# Patient Record
Sex: FEMALE | Race: WHITE | NOT HISPANIC OR LATINO | Employment: OTHER | URBAN - METROPOLITAN AREA
[De-identification: names, ages, dates, MRNs, and addresses within clinical notes are randomized per-mention and may not be internally consistent; named-entity substitution may affect disease eponyms.]

---

## 2017-03-24 ENCOUNTER — APPOINTMENT (OUTPATIENT)
Dept: GENERAL RADIOLOGY | Facility: HOSPITAL | Age: 72
End: 2017-03-24

## 2017-03-24 ENCOUNTER — APPOINTMENT (OUTPATIENT)
Dept: MRI IMAGING | Facility: HOSPITAL | Age: 72
End: 2017-03-24

## 2017-03-24 ENCOUNTER — HOSPITAL ENCOUNTER (OUTPATIENT)
Facility: HOSPITAL | Age: 72
Setting detail: OBSERVATION
Discharge: HOME | End: 2017-03-26
Attending: EMERGENCY MEDICINE | Admitting: INTERNAL MEDICINE

## 2017-03-24 ENCOUNTER — APPOINTMENT (OUTPATIENT)
Dept: CT IMAGING | Facility: HOSPITAL | Age: 72
End: 2017-03-24

## 2017-03-24 DIAGNOSIS — R41.82 ALTERED MENTAL STATUS, UNSPECIFIED: ICD-10-CM

## 2017-03-24 DIAGNOSIS — Z74.09 IMPAIRED FUNCTIONAL MOBILITY, BALANCE, GAIT, AND ENDURANCE: ICD-10-CM

## 2017-03-24 DIAGNOSIS — R41.841 COGNITIVE COMMUNICATION DEFICIT: ICD-10-CM

## 2017-03-24 DIAGNOSIS — Z78.9 IMPAIRED MOBILITY AND ADLS: ICD-10-CM

## 2017-03-24 DIAGNOSIS — R53.1 LEFT-SIDED WEAKNESS: ICD-10-CM

## 2017-03-24 DIAGNOSIS — R47.1 DYSARTHRIA: Primary | ICD-10-CM

## 2017-03-24 DIAGNOSIS — Z74.09 IMPAIRED MOBILITY AND ADLS: ICD-10-CM

## 2017-03-24 PROBLEM — G45.9 TIA (TRANSIENT ISCHEMIC ATTACK): Status: ACTIVE | Noted: 2017-03-24

## 2017-03-24 LAB
ALBUMIN SERPL-MCNC: 4.4 G/DL (ref 3.2–4.8)
ALBUMIN/GLOB SERPL: 2 G/DL (ref 1.5–2.5)
ALP SERPL-CCNC: 72 U/L (ref 25–100)
ALT SERPL W P-5'-P-CCNC: 30 U/L (ref 7–40)
ALT SERPL W P-5'-P-CCNC: 30 U/L (ref 7–40)
ANION GAP SERPL CALCULATED.3IONS-SCNC: 13 MMOL/L (ref 3–11)
APTT PPP: 28.2 SECONDS (ref 24–31)
AST SERPL-CCNC: 33 U/L (ref 0–33)
AST SERPL-CCNC: 35 U/L (ref 0–33)
BASOPHILS # BLD AUTO: 0.03 10*3/MM3 (ref 0–0.2)
BASOPHILS NFR BLD AUTO: 0.5 % (ref 0–1)
BILIRUB SERPL-MCNC: 0.6 MG/DL (ref 0.3–1.2)
BUN BLD-MCNC: 10 MG/DL (ref 9–23)
BUN/CREAT SERPL: 14.3 (ref 7–25)
CALCIUM SPEC-SCNC: 9.6 MG/DL (ref 8.7–10.4)
CHLORIDE SERPL-SCNC: 109 MMOL/L (ref 99–109)
CO2 SERPL-SCNC: 23 MMOL/L (ref 20–31)
CREAT BLD-MCNC: 0.7 MG/DL (ref 0.6–1.3)
DEPRECATED RDW RBC AUTO: 44.3 FL (ref 37–54)
EOSINOPHIL # BLD AUTO: 0.06 10*3/MM3 (ref 0.1–0.3)
EOSINOPHIL NFR BLD AUTO: 0.9 % (ref 0–3)
ERYTHROCYTE [DISTWIDTH] IN BLOOD BY AUTOMATED COUNT: 13.4 % (ref 11.3–14.5)
GFR SERPL CREATININE-BSD FRML MDRD: 82 ML/MIN/1.73
GLOBULIN UR ELPH-MCNC: 2.2 GM/DL
GLUCOSE BLD-MCNC: 132 MG/DL (ref 70–100)
HCT VFR BLD AUTO: 38.7 % (ref 34.5–44)
HGB BLD-MCNC: 12.4 G/DL (ref 11.5–15.5)
HOLD SPECIMEN: NORMAL
HOLD SPECIMEN: NORMAL
IMM GRANULOCYTES # BLD: 0.06 10*3/MM3 (ref 0–0.03)
IMM GRANULOCYTES NFR BLD: 0.9 % (ref 0–0.6)
INR PPP: 1 (ref 0.8–1.2)
LYMPHOCYTES # BLD AUTO: 2.16 10*3/MM3 (ref 0.6–4.8)
LYMPHOCYTES NFR BLD AUTO: 33.3 % (ref 24–44)
MCH RBC QN AUTO: 29.1 PG (ref 27–31)
MCHC RBC AUTO-ENTMCNC: 32 G/DL (ref 32–36)
MCV RBC AUTO: 90.8 FL (ref 80–99)
MONOCYTES # BLD AUTO: 0.33 10*3/MM3 (ref 0–1)
MONOCYTES NFR BLD AUTO: 5.1 % (ref 0–12)
NEUTROPHILS # BLD AUTO: 3.85 10*3/MM3 (ref 1.5–8.3)
NEUTROPHILS NFR BLD AUTO: 59.3 % (ref 41–71)
PLATELET # BLD AUTO: 158 10*3/MM3 (ref 150–450)
PMV BLD AUTO: 12.5 FL (ref 6–12)
POTASSIUM BLD-SCNC: 3.8 MMOL/L (ref 3.5–5.5)
PROT SERPL-MCNC: 6.6 G/DL (ref 5.7–8.2)
PROTHROMBIN TIME: 11.5 SECONDS (ref 12.8–15.2)
RBC # BLD AUTO: 4.26 10*6/MM3 (ref 3.89–5.14)
SODIUM BLD-SCNC: 145 MMOL/L (ref 132–146)
TROPONIN I SERPL-MCNC: 0 NG/ML (ref 0–0.07)
WBC NRBC COR # BLD: 6.49 10*3/MM3 (ref 3.5–10.8)
WHOLE BLOOD HOLD SPECIMEN: NORMAL
WHOLE BLOOD HOLD SPECIMEN: NORMAL

## 2017-03-24 PROCEDURE — 70551 MRI BRAIN STEM W/O DYE: CPT

## 2017-03-24 PROCEDURE — G0378 HOSPITAL OBSERVATION PER HR: HCPCS

## 2017-03-24 PROCEDURE — 87077 CULTURE AEROBIC IDENTIFY: CPT | Performed by: INTERNAL MEDICINE

## 2017-03-24 PROCEDURE — 87086 URINE CULTURE/COLONY COUNT: CPT | Performed by: INTERNAL MEDICINE

## 2017-03-24 PROCEDURE — 87186 SC STD MICRODIL/AGAR DIL: CPT | Performed by: INTERNAL MEDICINE

## 2017-03-24 PROCEDURE — 85610 PROTHROMBIN TIME: CPT

## 2017-03-24 PROCEDURE — 81001 URINALYSIS AUTO W/SCOPE: CPT | Performed by: INTERNAL MEDICINE

## 2017-03-24 PROCEDURE — 83036 HEMOGLOBIN GLYCOSYLATED A1C: CPT | Performed by: INTERNAL MEDICINE

## 2017-03-24 PROCEDURE — 99285 EMERGENCY DEPT VISIT HI MDM: CPT

## 2017-03-24 PROCEDURE — 93005 ELECTROCARDIOGRAM TRACING: CPT | Performed by: EMERGENCY MEDICINE

## 2017-03-24 PROCEDURE — 85730 THROMBOPLASTIN TIME PARTIAL: CPT | Performed by: EMERGENCY MEDICINE

## 2017-03-24 PROCEDURE — 80053 COMPREHEN METABOLIC PANEL: CPT | Performed by: INTERNAL MEDICINE

## 2017-03-24 PROCEDURE — 84450 TRANSFERASE (AST) (SGOT): CPT | Performed by: EMERGENCY MEDICINE

## 2017-03-24 PROCEDURE — 84460 ALANINE AMINO (ALT) (SGPT): CPT | Performed by: EMERGENCY MEDICINE

## 2017-03-24 PROCEDURE — 84484 ASSAY OF TROPONIN QUANT: CPT

## 2017-03-24 PROCEDURE — 71010 HC CHEST PA OR AP: CPT

## 2017-03-24 PROCEDURE — 85014 HEMATOCRIT: CPT

## 2017-03-24 PROCEDURE — 85025 COMPLETE CBC W/AUTO DIFF WBC: CPT | Performed by: EMERGENCY MEDICINE

## 2017-03-24 PROCEDURE — 70450 CT HEAD/BRAIN W/O DYE: CPT

## 2017-03-24 PROCEDURE — 99220 PR INITIAL OBSERVATION CARE/DAY 70 MINUTES: CPT | Performed by: INTERNAL MEDICINE

## 2017-03-24 PROCEDURE — 80047 BASIC METABLC PNL IONIZED CA: CPT

## 2017-03-24 RX ORDER — ASPIRIN 81 MG/1
81 TABLET, CHEWABLE ORAL DAILY
Status: DISCONTINUED | OUTPATIENT
Start: 2017-03-25 | End: 2017-03-26 | Stop reason: HOSPADM

## 2017-03-24 RX ORDER — CETIRIZINE HYDROCHLORIDE 10 MG/1
10 TABLET ORAL DAILY PRN
COMMUNITY

## 2017-03-24 RX ORDER — SODIUM CHLORIDE 0.9 % (FLUSH) 0.9 %
1-10 SYRINGE (ML) INJECTION AS NEEDED
Status: DISCONTINUED | OUTPATIENT
Start: 2017-03-24 | End: 2017-03-26 | Stop reason: HOSPADM

## 2017-03-24 RX ORDER — PAROXETINE HYDROCHLORIDE 20 MG/1
20 TABLET, FILM COATED ORAL EVERY MORNING
Status: DISCONTINUED | OUTPATIENT
Start: 2017-03-25 | End: 2017-03-26 | Stop reason: HOSPADM

## 2017-03-24 RX ORDER — ONDANSETRON 2 MG/ML
4 INJECTION INTRAMUSCULAR; INTRAVENOUS EVERY 6 HOURS PRN
Status: DISCONTINUED | OUTPATIENT
Start: 2017-03-24 | End: 2017-03-26 | Stop reason: HOSPADM

## 2017-03-24 RX ORDER — LEVOTHYROXINE SODIUM 0.05 MG/1
50 TABLET ORAL DAILY
Status: DISCONTINUED | OUTPATIENT
Start: 2017-03-25 | End: 2017-03-26 | Stop reason: HOSPADM

## 2017-03-24 RX ORDER — ASPIRIN 300 MG/1
300 SUPPOSITORY RECTAL DAILY
Status: DISCONTINUED | OUTPATIENT
Start: 2017-03-25 | End: 2017-03-26 | Stop reason: HOSPADM

## 2017-03-24 RX ORDER — ONDANSETRON 4 MG/1
4 TABLET, FILM COATED ORAL EVERY 6 HOURS PRN
Status: DISCONTINUED | OUTPATIENT
Start: 2017-03-24 | End: 2017-03-26 | Stop reason: HOSPADM

## 2017-03-24 RX ORDER — LEVOTHYROXINE SODIUM 0.07 MG/1
75 TABLET ORAL DAILY
COMMUNITY

## 2017-03-24 RX ORDER — ATORVASTATIN CALCIUM 40 MG/1
40 TABLET, FILM COATED ORAL NIGHTLY
Status: DISCONTINUED | OUTPATIENT
Start: 2017-03-24 | End: 2017-03-26 | Stop reason: HOSPADM

## 2017-03-24 RX ORDER — DEXTROSE MONOHYDRATE 25 G/50ML
25 INJECTION, SOLUTION INTRAVENOUS
Status: DISCONTINUED | OUTPATIENT
Start: 2017-03-24 | End: 2017-03-26 | Stop reason: HOSPADM

## 2017-03-24 RX ORDER — LISINOPRIL 10 MG/1
10 TABLET ORAL DAILY
Status: DISCONTINUED | OUTPATIENT
Start: 2017-03-25 | End: 2017-03-26 | Stop reason: HOSPADM

## 2017-03-24 RX ORDER — PAROXETINE HYDROCHLORIDE 20 MG/1
20 TABLET, FILM COATED ORAL EVERY MORNING
COMMUNITY

## 2017-03-24 RX ORDER — DOCUSATE SODIUM 100 MG/1
100 CAPSULE, LIQUID FILLED ORAL 2 TIMES DAILY
Status: DISCONTINUED | OUTPATIENT
Start: 2017-03-25 | End: 2017-03-26 | Stop reason: HOSPADM

## 2017-03-24 RX ORDER — LISINOPRIL 10 MG/1
10 TABLET ORAL DAILY
COMMUNITY

## 2017-03-24 RX ORDER — ATORVASTATIN CALCIUM 40 MG/1
40 TABLET, FILM COATED ORAL DAILY
COMMUNITY

## 2017-03-24 RX ORDER — SODIUM CHLORIDE 0.9 % (FLUSH) 0.9 %
10 SYRINGE (ML) INJECTION AS NEEDED
Status: DISCONTINUED | OUTPATIENT
Start: 2017-03-24 | End: 2017-03-26 | Stop reason: HOSPADM

## 2017-03-24 RX ORDER — ACETAMINOPHEN 325 MG/1
650 TABLET ORAL EVERY 6 HOURS PRN
Status: DISCONTINUED | OUTPATIENT
Start: 2017-03-24 | End: 2017-03-26 | Stop reason: HOSPADM

## 2017-03-24 RX ORDER — NICOTINE POLACRILEX 4 MG
15 LOZENGE BUCCAL
Status: DISCONTINUED | OUTPATIENT
Start: 2017-03-24 | End: 2017-03-26 | Stop reason: HOSPADM

## 2017-03-24 RX ORDER — FAMOTIDINE 20 MG/1
20 TABLET, FILM COATED ORAL 2 TIMES DAILY
Status: DISCONTINUED | OUTPATIENT
Start: 2017-03-25 | End: 2017-03-26 | Stop reason: HOSPADM

## 2017-03-24 RX ORDER — ASPIRIN 81 MG/1
81 TABLET ORAL DAILY
COMMUNITY

## 2017-03-24 NOTE — ED PROVIDER NOTES
Subjective   HPI Comments: Mrs. Montalvo is a 71 y.o female who presents to the ED with AMS. Pt was flown in today via helicopter after at her PCP she had some mild left sided weakness, confusion, and slurred speech. EMS reports that she has episodes of confusion, and her slurred speech comes and goes and is better now than she they picked her up. Pt herself complains of burry vision, but no other acute sx at this time.    She had a pressure of 220/120 and blood sugar of 149 when conducted by in flight EMS.    Patient is a 71 y.o. female presenting with neurologic complaint.   History provided by:  EMS personnel  Neurologic Problem   The patient's primary symptoms include an altered mental status, focal weakness and slurred speech. This is a new problem. The current episode started today. The neurological problem developed suddenly. Last Known Well Instant: Unknown.  The problem has been gradually improving since onset. There was left-sided focality noted. Associated symptoms include confusion.       Review of Systems   Unable to perform ROS: Mental status change   Eyes: Positive for visual disturbance.   Neurological: Positive for focal weakness, facial asymmetry and speech difficulty.   Psychiatric/Behavioral: Positive for confusion.       Past Medical History:   Diagnosis Date   • Arthritis    • CHF (congestive heart failure)    • Coronary artery disease    • Depression    • Diabetes mellitus    • Disease of thyroid gland    • Migraine    • Myocardial infarction    • Pneumonia        No Known Allergies    Past Surgical History:   Procedure Laterality Date   • CARDIAC SURGERY         History reviewed. No pertinent family history.    Social History     Social History   • Marital status:      Spouse name: N/A   • Number of children: N/A   • Years of education: N/A     Social History Main Topics   • Smoking status: Never Smoker   • Smokeless tobacco: None   • Alcohol use No   • Drug use: No   • Sexual activity:  Defer     Other Topics Concern   • None     Social History Narrative   • None         Objective   Physical Exam   Constitutional: She appears well-developed and well-nourished. No distress.   Appears confused   HENT:   Head: Normocephalic and atraumatic.   Eyes: Conjunctivae are normal.   Neck: Normal range of motion. Neck supple. Carotid bruit is not present.   Cardiovascular: Intact distal pulses.    Pulses:       Radial pulses are 2+ on the right side, and 2+ on the left side.        Posterior tibial pulses are 2+ on the right side, and 2+ on the left side.   Pulmonary/Chest: Effort normal and breath sounds normal. No respiratory distress.   Abdominal: Soft. There is no tenderness.   Musculoskeletal: Normal range of motion.   Neurological: She is alert. She has normal strength. No sensory deficit.   Negative bilateral straight leg test. No pronator drift. Sensation intact in all 4 extremities. Pt somewhat confused when both lower extremities are touched at the same time. She changes back and forth being able to feel left vs right but has sensation bilaterally. Oriented to place, but confused about month and year.    Skin: Skin is warm and dry.   Psychiatric: She has a normal mood and affect. Her behavior is normal.   Nursing note and vitals reviewed.      Critical Care  Performed by: HAJA DISLA  Authorized by: HAJA DISLA   Total critical care time: 40 minutes  Critical care time was exclusive of separately billable procedures and treating other patients.  Critical care was necessary to treat or prevent imminent or life-threatening deterioration of the following conditions: CNS failure or compromise.  Critical care was time spent personally by me on the following activities: discussions with consultants, evaluation of patient's response to treatment, obtaining history from patient or surrogate, ordering and review of laboratory studies, pulse oximetry, re-evaluation of patient's condition, ordering and  review of radiographic studies, ordering and performing treatments and interventions, examination of patient, development of treatment plan with patient or surrogate and review of old charts.  Comments: Multiple re-evaluations, serial examinations, discussions with hospitalist, decide to not administer tPa.               ED Course  ED Course   Comment By Time   Dr. Griffiths discussed in more detail the presenting sx with family. They state she has spells of loss of vision, hearing, and confusion. Pt notes that her speech has improved and feels much better than when she came into the hospital. Pt is understanding of the plan. Jerrica Nix 03/24 1841   Discussed case with Dr. Yung, hospitalist, who will admit the pt. -MS Jerrica Nix 03/24 1911     Recent Results (from the past 24 hour(s))   aPTT    Collection Time: 03/24/17  6:29 PM   Result Value Ref Range    PTT 28.2 24.0 - 31.0 seconds   AST    Collection Time: 03/24/17  6:29 PM   Result Value Ref Range    AST (SGOT) 33 0 - 33 U/L   ALT    Collection Time: 03/24/17  6:29 PM   Result Value Ref Range    ALT (SGPT) 30 7 - 40 U/L   CBC Auto Differential    Collection Time: 03/24/17  6:29 PM   Result Value Ref Range    WBC 6.49 3.50 - 10.80 10*3/mm3    RBC 4.26 3.89 - 5.14 10*6/mm3    Hemoglobin 12.4 11.5 - 15.5 g/dL    Hematocrit 38.7 34.5 - 44.0 %    MCV 90.8 80.0 - 99.0 fL    MCH 29.1 27.0 - 31.0 pg    MCHC 32.0 32.0 - 36.0 g/dL    RDW 13.4 11.3 - 14.5 %    RDW-SD 44.3 37.0 - 54.0 fl    MPV 12.5 (H) 6.0 - 12.0 fL    Platelets 158 150 - 450 10*3/mm3    Neutrophil % 59.3 41.0 - 71.0 %    Lymphocyte % 33.3 24.0 - 44.0 %    Monocyte % 5.1 0.0 - 12.0 %    Eosinophil % 0.9 0.0 - 3.0 %    Basophil % 0.5 0.0 - 1.0 %    Immature Grans % 0.9 (H) 0.0 - 0.6 %    Neutrophils, Absolute 3.85 1.50 - 8.30 10*3/mm3    Lymphocytes, Absolute 2.16 0.60 - 4.80 10*3/mm3    Monocytes, Absolute 0.33 0.00 - 1.00 10*3/mm3    Eosinophils, Absolute 0.06 (L) 0.10 - 0.30 10*3/mm3    Basophils,  "Absolute 0.03 0.00 - 0.20 10*3/mm3    Immature Grans, Absolute 0.06 (H) 0.00 - 0.03 10*3/mm3   POC Troponin, Rapid    Collection Time: 03/24/17  6:30 PM   Result Value Ref Range    Troponin I 0.00 0.00 - 0.07 ng/mL     Note: In addition to lab results from this visit, the labs listed above may include labs taken at another facility or during a different encounter within the last 24 hours. Please correlate lab times with ED admission and discharge times for further clarification of the services performed during this visit.    CT Head Without Contrast Stroke Protocol    (Results Pending)   XR Chest 1 View    (Results Pending)     Vitals:    03/24/17 1725 03/24/17 1930   BP: 168/86 175/88   BP Location: Left arm    Patient Position: Lying    Pulse: 92 90   Resp: 18    Temp: 99.6 °F (37.6 °C)    TempSrc: Oral    SpO2: 96% 94%   Weight: 162 lb (73.5 kg)    Height: 68\" (172.7 cm)      Medications   sodium chloride 0.9 % flush 10 mL (not administered)     ECG/EMG Results (last 24 hours)     Procedure Component Value Units Date/Time    ECG 12 Lead [[id number]] Collected:  03/24/17 1833     Updated:  03/24/17 1841                        MDM    Final diagnoses:   Dysarthria   Altered mental status, unspecified   Left-sided weakness       Documentation assistance provided by librado STAPLETON.  Information recorded by the scribe was done at my direction and has been verified and validated by me.     Jerrica Stapleton  03/24/17 1741       Jerrica Stapleton  03/24/17 1742       Jerrica Stapleton  03/24/17 1828       Jerrica Stapleton  03/24/17 1829       Jerrica Stapleton  03/24/17 1939       Jerrica Stapleton  03/24/17 1942       Sammy Griffiths MD  03/27/17 2204    "

## 2017-03-24 NOTE — NURSING NOTE
"Stroke Navigator CODE 19    HPI    Kaylan Roland is a 71 y.o.  female on whom I am evaluating as a Code 19 for a possible acute stroke.     Code 19 location: ED; arrived via [employer]    Mrs. Roland was at her PCP's office today when they noticed \"slight\" left sided weakness, confusion, and facial droop.  EMS was called.  Upon arrival EMS noted that she had some slurred speech but no focal weakness or facial droop.  Her BP was 220/120.  Her slurred speech has been fluctuating and confusion has improved per flight crew.    · Last known well: just prior to symptom onset    · Symptom onset: ~1630  · GCS: 14  · Baseline level of function known yes; Modified Linn: 0   · Current symptoms include; confusion.   · Patient is not a candidate for thrombolytic therapy per Dr. Griffiths.  · Patient is not a candidate for Neuro Intervention due to NIHSS <6.    Stroke risk factors: hypertension.     Prior stroke history: no    · Imaging performed: CT head    NIHSS       1a. Level Of Consciousness: 0-->Alert: keenly responsive  1b. LOC Questions: 1-->Answers one question correctly  1c. LOC Commands: 0-->Performs both tasks correctly  2. Best Gaze: 0-->Normal  3. Visual: 0-->No visual loss  4. Facial Palsy: 0-->Normal symmetrical movements  5a. Motor Arm, Left: 0-->No drift: limb holds 90 (or 45) degrees for full 10 secs  5b. Motor Arm, Right: 0-->No drift: limb holds 90 (or 45) degrees for full 10 secs  6a. Motor Leg, Left: 0-->No drift: leg holds 30 degree position for full 5 secs  6b. Motor Leg, Right: 0-->No drift: leg holds 30 degree position for full 5 secs  7. Limb Ataxia: 0-->Absent  8. Sensory: 0-->Normal: no sensory loss  9. Best Language: 0-->No aphasia: normal  10. Dysarthria: 0-->Normal  11. Extinction and Inattention (formerly Neglect): 0-->No abnormality    Total (NIH Stroke Scale): 1    PLAN    Await final diagnosis and plan from Dr. Griffiths and Neurology.  We will continue to follow.      Christy Lyle, " RN  Stroke Navigator/Nurse Extender

## 2017-03-25 ENCOUNTER — APPOINTMENT (OUTPATIENT)
Dept: CARDIOLOGY | Facility: HOSPITAL | Age: 72
End: 2017-03-25
Attending: INTERNAL MEDICINE

## 2017-03-25 ENCOUNTER — APPOINTMENT (OUTPATIENT)
Dept: NEUROLOGY | Facility: HOSPITAL | Age: 72
End: 2017-03-25
Attending: PSYCHIATRY & NEUROLOGY

## 2017-03-25 PROBLEM — IMO0001 PAROXYSMAL SPELLS: Status: ACTIVE | Noted: 2017-03-25

## 2017-03-25 LAB
ARTICHOKE IGE QN: 26 MG/DL (ref 0–130)
BACTERIA UR QL AUTO: ABNORMAL /HPF
BH CV ECHO MEAS - AI DEC SLOPE: 166.8 CM/SEC^2
BH CV ECHO MEAS - AI MAX PG: 72.8 MMHG
BH CV ECHO MEAS - AI MAX VEL: 426.2 CM/SEC
BH CV ECHO MEAS - AI P1/2T: 748.3 MSEC
BH CV ECHO MEAS - AO MAX PG (FULL): 61.3 MMHG
BH CV ECHO MEAS - AO MAX PG: 65.4 MMHG
BH CV ECHO MEAS - AO MEAN PG (FULL): 36.3 MMHG
BH CV ECHO MEAS - AO MEAN PG: 38.8 MMHG
BH CV ECHO MEAS - AO ROOT AREA: 6.7 CM^2
BH CV ECHO MEAS - AO ROOT DIAM: 2.9 CM
BH CV ECHO MEAS - AO V2 MAX: 404.5 CM/SEC
BH CV ECHO MEAS - AO V2 MEAN: 289.1 CM/SEC
BH CV ECHO MEAS - AO V2 VTI: 103.8 CM
BH CV ECHO MEAS - AVA(I,A): 0.77 CM^2
BH CV ECHO MEAS - AVA(I,D): 0.77 CM^2
BH CV ECHO MEAS - AVA(V,A): 0.78 CM^2
BH CV ECHO MEAS - AVA(V,D): 0.78 CM^2
BH CV ECHO MEAS - CONTRAST EF (2CH): 55.6 ML/M^2
BH CV ECHO MEAS - CONTRAST EF 4CH: 74.4 ML/M^2
BH CV ECHO MEAS - EDV(CUBED): 101.6 ML
BH CV ECHO MEAS - EDV(MOD-SP2): 27 ML
BH CV ECHO MEAS - EDV(MOD-SP4): 39 ML
BH CV ECHO MEAS - EDV(TEICH): 100.6 ML
BH CV ECHO MEAS - EF(CUBED): 71.6 %
BH CV ECHO MEAS - EF(MOD-SP2): 55.6 %
BH CV ECHO MEAS - EF(MOD-SP4): 74.4 %
BH CV ECHO MEAS - EF(TEICH): 63.3 %
BH CV ECHO MEAS - ESV(CUBED): 28.9 ML
BH CV ECHO MEAS - ESV(MOD-SP2): 12 ML
BH CV ECHO MEAS - ESV(MOD-SP4): 10 ML
BH CV ECHO MEAS - ESV(TEICH): 37 ML
BH CV ECHO MEAS - FS: 34.2 %
BH CV ECHO MEAS - IVS/LVPW: 1.2
BH CV ECHO MEAS - IVSD: 1.5 CM
BH CV ECHO MEAS - LA DIMENSION: 4.3 CM
BH CV ECHO MEAS - LA/AO: 1.5
BH CV ECHO MEAS - LV MASS(C)D: 264.9 GRAMS
BH CV ECHO MEAS - LV MAX PG: 4.2 MMHG
BH CV ECHO MEAS - LV MEAN PG: 2.5 MMHG
BH CV ECHO MEAS - LV V1 MAX: 102.2 CM/SEC
BH CV ECHO MEAS - LV V1 MEAN: 74.5 CM/SEC
BH CV ECHO MEAS - LV V1 VTI: 26 CM
BH CV ECHO MEAS - LVIDD: 4.7 CM
BH CV ECHO MEAS - LVIDS: 3.1 CM
BH CV ECHO MEAS - LVLD AP2: 4.8 CM
BH CV ECHO MEAS - LVLD AP4: 6.1 CM
BH CV ECHO MEAS - LVLS AP2: 4.2 CM
BH CV ECHO MEAS - LVLS AP4: 4.6 CM
BH CV ECHO MEAS - LVOT AREA (M): 3.1 CM^2
BH CV ECHO MEAS - LVOT AREA: 3.1 CM^2
BH CV ECHO MEAS - LVOT DIAM: 2 CM
BH CV ECHO MEAS - LVPWD: 1.3 CM
BH CV ECHO MEAS - MV A MAX VEL: 70.1 CM/SEC
BH CV ECHO MEAS - MV E MAX VEL: 96.3 CM/SEC
BH CV ECHO MEAS - MV E/A: 1.4
BH CV ECHO MEAS - PA ACC SLOPE: 457.9 CM/SEC^2
BH CV ECHO MEAS - PA ACC TIME: 0.16 SEC
BH CV ECHO MEAS - PA PR(ACCEL): 7.7 MMHG
BH CV ECHO MEAS - SV(AO): 692.3 ML
BH CV ECHO MEAS - SV(CUBED): 72.7 ML
BH CV ECHO MEAS - SV(LVOT): 79.8 ML
BH CV ECHO MEAS - SV(MOD-SP2): 15 ML
BH CV ECHO MEAS - SV(MOD-SP4): 29 ML
BH CV ECHO MEAS - SV(TEICH): 63.7 ML
BH CV ECHO MEAS - TAPSE (>1.6): 1.4 CM2
BH CV ECHO MEAS - TR MAX VEL: 309.9 CM/SEC
BH CV XLRA - RV BASE: 3.9 CM
BH CV XLRA - RV LENGTH: 5.1 CM
BH CV XLRA - RV MID: 2.8 CM
BH CV XLRA MEAS LEFT CCA RATIO VEL: 52.8 CM/SEC
BH CV XLRA MEAS LEFT DIST CCA EDV: 12.2 CM/SEC
BH CV XLRA MEAS LEFT DIST CCA PSV: 53.3 CM/SEC
BH CV XLRA MEAS LEFT DIST ICA EDV: 26.6 CM/SEC
BH CV XLRA MEAS LEFT DIST ICA PSV: 100.8 CM/SEC
BH CV XLRA MEAS LEFT ICA RATIO VEL: 92.5 CM/SEC
BH CV XLRA MEAS LEFT ICA/CCA RATIO: 1.8
BH CV XLRA MEAS LEFT MID ICA EDV: 31.9 CM/SEC
BH CV XLRA MEAS LEFT MID ICA PSV: 93 CM/SEC
BH CV XLRA MEAS LEFT PROX CCA EDV: 18.2 CM/SEC
BH CV XLRA MEAS LEFT PROX CCA PSV: 74 CM/SEC
BH CV XLRA MEAS LEFT PROX ECA PSV: 51.1 CM/SEC
BH CV XLRA MEAS LEFT PROX ICA EDV: 24 CM/SEC
BH CV XLRA MEAS LEFT PROX ICA PSV: 77.7 CM/SEC
BH CV XLRA MEAS LEFT PROX SCLA PSV: 94.3 CM/SEC
BH CV XLRA MEAS LEFT VERTEBRAL A EDV: 13.1 CM/SEC
BH CV XLRA MEAS LEFT VERTEBRAL A PSV: 36.7 CM/SEC
BH CV XLRA MEAS RIGHT CCA RATIO VEL: 52.5 CM/SEC
BH CV XLRA MEAS RIGHT DIST CCA EDV: 16.2 CM/SEC
BH CV XLRA MEAS RIGHT DIST CCA PSV: 53 CM/SEC
BH CV XLRA MEAS RIGHT DIST ICA EDV: 18.7 CM/SEC
BH CV XLRA MEAS RIGHT DIST ICA PSV: 79.6 CM/SEC
BH CV XLRA MEAS RIGHT ICA RATIO VEL: 91.3 CM/SEC
BH CV XLRA MEAS RIGHT ICA/CCA RATIO: 1.7
BH CV XLRA MEAS RIGHT MID ICA EDV: 27.5 CM/SEC
BH CV XLRA MEAS RIGHT MID ICA PSV: 91.8 CM/SEC
BH CV XLRA MEAS RIGHT PROX CCA EDV: 23.6 CM/SEC
BH CV XLRA MEAS RIGHT PROX CCA PSV: 89.6 CM/SEC
BH CV XLRA MEAS RIGHT PROX ECA PSV: 46.7 CM/SEC
BH CV XLRA MEAS RIGHT PROX ICA EDV: 26 CM/SEC
BH CV XLRA MEAS RIGHT PROX ICA PSV: 86.4 CM/SEC
BH CV XLRA MEAS RIGHT PROX SCLA PSV: 99.8 CM/SEC
BH CV XLRA MEAS RIGHT VERTEBRAL A PSV: 50.6 CM/SEC
BILIRUB UR QL STRIP: NEGATIVE
CHOLEST SERPL-MCNC: 66 MG/DL (ref 0–200)
CLARITY UR: CLEAR
COLOR UR: YELLOW
GLUCOSE BLDC GLUCOMTR-MCNC: 127 MG/DL (ref 70–130)
GLUCOSE BLDC GLUCOMTR-MCNC: 146 MG/DL (ref 70–130)
GLUCOSE BLDC GLUCOMTR-MCNC: 170 MG/DL (ref 70–130)
GLUCOSE BLDC GLUCOMTR-MCNC: 183 MG/DL (ref 70–130)
GLUCOSE BLDC GLUCOMTR-MCNC: 185 MG/DL (ref 70–130)
GLUCOSE BLDC GLUCOMTR-MCNC: 238 MG/DL (ref 70–130)
GLUCOSE UR STRIP-MCNC: NEGATIVE MG/DL
HBA1C MFR BLD: 6.6 % (ref 4.8–5.6)
HDLC SERPL-MCNC: 28 MG/DL (ref 40–60)
HGB UR QL STRIP.AUTO: NEGATIVE
HYALINE CASTS UR QL AUTO: ABNORMAL /LPF
KETONES UR QL STRIP: NEGATIVE
LEUKOCYTE ESTERASE UR QL STRIP.AUTO: ABNORMAL
LV EF 2D ECHO EST: 65 %
NITRITE UR QL STRIP: NEGATIVE
PH UR STRIP.AUTO: 6 [PH] (ref 5–8)
PROT UR QL STRIP: NEGATIVE
RBC # UR: ABNORMAL /HPF
REF LAB TEST METHOD: ABNORMAL
SP GR UR STRIP: 1.02 (ref 1–1.03)
SQUAMOUS #/AREA URNS HPF: ABNORMAL /HPF
TRIGL SERPL-MCNC: 60 MG/DL (ref 0–150)
TSH SERPL DL<=0.05 MIU/L-ACNC: 3.92 MIU/ML (ref 0.35–5.35)
UROBILINOGEN UR QL STRIP: ABNORMAL
WBC UR QL AUTO: ABNORMAL /HPF

## 2017-03-25 PROCEDURE — G9162 LANG EXPRESS CURRENT STATUS: HCPCS

## 2017-03-25 PROCEDURE — G8989 SELF CARE D/C STATUS: HCPCS

## 2017-03-25 PROCEDURE — 97165 OT EVAL LOW COMPLEX 30 MIN: CPT

## 2017-03-25 PROCEDURE — 99205 OFFICE O/P NEW HI 60 MIN: CPT | Performed by: PSYCHIATRY & NEUROLOGY

## 2017-03-25 PROCEDURE — 93306 TTE W/DOPPLER COMPLETE: CPT | Performed by: INTERNAL MEDICINE

## 2017-03-25 PROCEDURE — G8979 MOBILITY GOAL STATUS: HCPCS

## 2017-03-25 PROCEDURE — 95816 EEG AWAKE AND DROWSY: CPT | Performed by: PSYCHIATRY & NEUROLOGY

## 2017-03-25 PROCEDURE — 93306 TTE W/DOPPLER COMPLETE: CPT

## 2017-03-25 PROCEDURE — 99226 PR SBSQ OBSERVATION CARE/DAY 35 MINUTES: CPT | Performed by: INTERNAL MEDICINE

## 2017-03-25 PROCEDURE — G8978 MOBILITY CURRENT STATUS: HCPCS

## 2017-03-25 PROCEDURE — 95816 EEG AWAKE AND DROWSY: CPT

## 2017-03-25 PROCEDURE — G0378 HOSPITAL OBSERVATION PER HR: HCPCS

## 2017-03-25 PROCEDURE — G8980 MOBILITY D/C STATUS: HCPCS

## 2017-03-25 PROCEDURE — 97161 PT EVAL LOW COMPLEX 20 MIN: CPT

## 2017-03-25 PROCEDURE — 63710000001 INSULIN LISPRO (HUMAN) PER 5 UNITS: Performed by: INTERNAL MEDICINE

## 2017-03-25 PROCEDURE — 93880 EXTRACRANIAL BILAT STUDY: CPT | Performed by: INTERNAL MEDICINE

## 2017-03-25 PROCEDURE — 82962 GLUCOSE BLOOD TEST: CPT

## 2017-03-25 PROCEDURE — 84443 ASSAY THYROID STIM HORMONE: CPT | Performed by: INTERNAL MEDICINE

## 2017-03-25 PROCEDURE — G9163 LANG EXPRESS GOAL STATUS: HCPCS

## 2017-03-25 PROCEDURE — G8987 SELF CARE CURRENT STATUS: HCPCS

## 2017-03-25 PROCEDURE — 92522 EVALUATE SPEECH PRODUCTION: CPT

## 2017-03-25 PROCEDURE — 93880 EXTRACRANIAL BILAT STUDY: CPT

## 2017-03-25 PROCEDURE — G8988 SELF CARE GOAL STATUS: HCPCS

## 2017-03-25 PROCEDURE — 80061 LIPID PANEL: CPT | Performed by: INTERNAL MEDICINE

## 2017-03-25 PROCEDURE — G9164 LANG EXPRESS D/C STATUS: HCPCS

## 2017-03-25 RX ORDER — CEFUROXIME AXETIL 250 MG/1
250 TABLET ORAL EVERY 12 HOURS SCHEDULED
Status: DISCONTINUED | OUTPATIENT
Start: 2017-03-25 | End: 2017-03-26 | Stop reason: HOSPADM

## 2017-03-25 RX ORDER — TOPIRAMATE 25 MG/1
50 TABLET ORAL NIGHTLY
Status: DISCONTINUED | OUTPATIENT
Start: 2017-03-25 | End: 2017-03-25

## 2017-03-25 RX ORDER — TOPIRAMATE 25 MG/1
50 TABLET ORAL EVERY 12 HOURS SCHEDULED
Status: DISCONTINUED | OUTPATIENT
Start: 2017-03-25 | End: 2017-03-26 | Stop reason: HOSPADM

## 2017-03-25 RX ADMIN — ATORVASTATIN CALCIUM 40 MG: 40 TABLET, FILM COATED ORAL at 20:17

## 2017-03-25 RX ADMIN — CEFUROXIME AXETIL 250 MG: 250 TABLET ORAL at 16:31

## 2017-03-25 RX ADMIN — LEVOTHYROXINE SODIUM 50 MCG: 50 TABLET ORAL at 09:48

## 2017-03-25 RX ADMIN — INSULIN LISPRO 2 UNITS: 100 INJECTION, SOLUTION INTRAVENOUS; SUBCUTANEOUS at 12:14

## 2017-03-25 RX ADMIN — LISINOPRIL 10 MG: 10 TABLET ORAL at 09:48

## 2017-03-25 RX ADMIN — FAMOTIDINE 20 MG: 20 TABLET ORAL at 09:48

## 2017-03-25 RX ADMIN — INSULIN LISPRO 2 UNITS: 100 INJECTION, SOLUTION INTRAVENOUS; SUBCUTANEOUS at 17:21

## 2017-03-25 RX ADMIN — INSULIN LISPRO 2 UNITS: 100 INJECTION, SOLUTION INTRAVENOUS; SUBCUTANEOUS at 20:18

## 2017-03-25 RX ADMIN — DOCUSATE SODIUM 100 MG: 100 CAPSULE, LIQUID FILLED ORAL at 09:48

## 2017-03-25 RX ADMIN — ASPIRIN 81 MG: 81 TABLET, CHEWABLE ORAL at 09:48

## 2017-03-25 RX ADMIN — TOPIRAMATE 50 MG: 25 TABLET, FILM COATED ORAL at 20:17

## 2017-03-25 RX ADMIN — FAMOTIDINE 20 MG: 20 TABLET ORAL at 17:20

## 2017-03-25 RX ADMIN — PAROXETINE HYDROCHLORIDE 20 MG: 20 TABLET, FILM COATED ORAL at 06:49

## 2017-03-25 RX ADMIN — Medication 5 MG: at 21:43

## 2017-03-25 RX ADMIN — ATORVASTATIN CALCIUM 40 MG: 40 TABLET, FILM COATED ORAL at 00:06

## 2017-03-25 NOTE — PROGRESS NOTES
Acute Care - Speech Language Pathology Initial Evaluation  [facility]     Patient Name: Nette Montalvo  : 1945  MRN: [id number]  Today's Date: 3/25/2017  Onset of Illness/Injury or Date of Surgery Date: 17            Admit Date: 3/24/2017     Visit Dx:    ICD-10-CM ICD-9-CM   1. Dysarthria R47.1 784.51   2. Altered mental status, unspecified R41.82 780.97   3. Left-sided weakness M62.81 728.87   4. Impaired mobility and ADLs Z74.09 799.89   5. Cognitive communication deficit R41.841 799.52     Patient Active Problem List   Diagnosis   • TIA (transient ischemic attack)   • Dysarthria   • Paroxysmal spells     Past Medical History:   Diagnosis Date   • Arthritis    • CHF (congestive heart failure)    • Coronary artery disease    • Depression    • Diabetes mellitus    • Disease of thyroid gland    • Migraine    • Myocardial infarction    • Pneumonia      Past Surgical History:   Procedure Laterality Date   • CARDIAC SURGERY            SLP EVALUATION (last 72 hours)      SLP Evaluation       17 1200                Rehab Evaluation    Document Type evaluation  -JA        Subjective Information no complaints  -JA        Patient Effort, Rehab Treatment good  -JA        Symptoms Noted During/After Treatment none  -JA        General Information    Patient Profile Review yes  -JA        Subjective Patient Observations alert and cooperative. Reports baseline word finding and memory difficulty,   -JA        Pertinent History Of Current Problem R/O seizure vs migraines.   -JA        Current Diet Limitations regular solid;thin liquids  -JA        Precautions/Limitations, Vision WFL with corrective lenses  -JA        Precautions/Limitations, Hearing WFL  -JA        Prior Level of Function- Communication functional in all spheres  -JA        Prior Level of Function- Swallowing no diet consistency restrictions  -JA        Plans/Goals Discussed With patient;family  -JA        Barriers to Rehab none identified  -JA         Living Environment    Lives With child(francia), adult  -        Clinical Impression    SLP Diagnosis Speech and receptive language wfls. Mild expressive language deficits. Mild to moderate cognitive deficits. Basic problem solving and reasoning wfls. Pt reports deficits are baseline however they do impact her ADLs. Pt is interested in education on compensatory stratgies for memory and cognition.   -        Prognosis Good   -        Functional Level At Time Of Evaluation impaired  -        Patient's Goals For Discharge return home  -        Criteria for Skilled Therapeutic Interventions Met skilled criteria for cognitive linguistic intervention met  -        Rehab Potential good, to achieve stated therapy goals  -        Therapy Frequency 1-2 times/wk  -        Predicted Duration of Therapy Intervention (days/wks) 2-4 days  -        Expected Duration of Therapy Session (min) 15-30 minutes  -        Anticipated Discharge Disposition home  -        Cognitive Assessment/Intervention    Current Cognitive/Communication Assessment impaired  -        Orientation Status oriented x 4  -        Follows Commands/Answers Questions 100% of the time  -        Additional Documentation Cognitive Assessment Intervention (Group)  -        Cognitive Assessment Intervention    Behavior/Mood Observations alert  -        Attention WNL/WFL  -        Communication Assessment/Intervention    Additional Documentation Auditory Comprehen Assess/Intervention (Group);Reading Assessment/Intervention (Group);Verbal Expression Assess/Intervention (Group);Motor Speech Assessment/Intervention (Group)  -        Auditory Comprehen Assess/Intervention    Auditory Comprehension WNL/WFL  -        Auditory Comprehen Assess/Intervention    Answers Yes/No Questions WNL/WFL  -JA        Able to Follow Commands WNL/WFL  -JA        Verbal Expression Assess/Intervention    Automatic Speech WNL/WFL  -JA        Speech  Fluency non-fluent speech  -JA        Speech Fluency Comment word finding difficulty in conversation   -JA        Conversational Speech mild impairment  -JA        Reading Assessment/Intervention    Reading Skills WNL/WFL  -JA        Motor Speech Assess/Intervention    Motor Speech-Apraxia Observations no concerns  -JA        Motor Speech- Apraxia WNL/WFL  -JA        Motor Speech-Dysarthria Observations no concerns  -JA        Motor Speech- Dysarthria WNL/WFL  -JA        Communication Treatment Objective and Progress    Expressive Treatment Objectives Improve connected speech to express thoughts;Patient will implement strategies  -JA        Cognitive Linguistic Treatment Objectives Improve memory skills  -JA        Improve connected speech to express thoughts    Improve connected speech to express thoughts by: describing a picture;80%;without cues  -        Status: Improve connected speech to express thoughts New  -JA        Improve memory skills    Improve memory skills through: use memory strategies;80%;without cues  -        Status: Improve memory skills New  -          User Key  (r) = Recorded By, (t) = Taken By, (c) = Cosigned By    Initials Name Effective Dates    JAISON Lugo CCC-SLP 06/22/15 -            EDUCATION  The patient has been educated in the following areas:   Cognitive Impairment Communication Impairment.    SLP Recommendation and Plan  SLP Diagnosis: Speech and receptive language wfls. Mild expressive language deficits. Mild to moderate cognitive deficits. Basic problem solving and reasoning wfls. Pt reports deficits are baseline however they do impact her ADLs. Pt is interested in education on compensatory stratgies for memory and cognition.   Prognosis: Good   Rehab Potential: good, to achieve stated therapy goals  Criteria for Skilled Therapeutic Interventions Met: skilled criteria for cognitive linguistic intervention met  Anticipated Discharge Disposition: home     Therapy  Frequency: 1-2 times/wk  Predicted Duration of Therapy Intervention (days/wks): 2-4 days  Expected Duration of Therapy Session (min): 15-30 minutes    Plan of Care Review  Plan Of Care Reviewed With: patient, daughter  Outcome Summary/Follow up Plan: Noted cognitive deficits in the area of memory. Expressive language deficits noted c/b difficulty w/ word finding in conversation. Pt reports this is baseline and worsens during episodes.  Pt presents as r/o migraines vs seizures. Rec: SLP f/u for compensatory stratgies for language and cog deficits since it impacts ADL.             Time Calculation:         Time Calculation- SLP       03/25/17 1420          Time Calculation- SLP    SLP Start Time 1200  -JAISON      SLP Received On 03/25/17  -JAISON        User Key  (r) = Recorded By, (t) = Taken By, (c) = Cosigned By    Initials Name Provider Type    AZALIA Feng Speech and Language Pathologist          Therapy Charges for Today     Code Description Service Date Service Provider Modifiers Qty    [id number] [facility] SPEECH SOUND PRODUCTION 3 3/25/2017 AZALIA Ashford GN 1                     AZALIA Odom  3/25/2017

## 2017-03-25 NOTE — PLAN OF CARE
Problem: Patient Care Overview (Adult)  Goal: Plan of Care Review  Outcome: Outcome(s) achieved Date Met:  03/25/17 03/25/17 1340   Coping/Psychosocial Response Interventions   Plan Of Care Reviewed With patient   Outcome Evaluation   Outcome Summary/Follow up Plan OT eval complete. Pt with no new deficits and pt functioning at baseline with self care and mobility. No skilled OT indicated.

## 2017-03-25 NOTE — PROGRESS NOTES
Adult Nutrition    Patient Name:  Nette Montalvo  YOB: 1945  MRN: [id number]  Admit Date:  3/24/2017    Assessment Date:  3/25/2017        Reason for Assessment       03/25/17 0747    Reason for Assessment    Reason For Assessment/Visit other (comment)   Clarified Nutrition Screen: pt's daughter reports that pt has not had any recent unintentional wt loss therefore pt does not meet nutrition risk screen criteria    Time Spent (min) 5              Electronically signed by:  Kassie Diallo MS RD/LD [facility]  03/25/17 7:48 AM

## 2017-03-25 NOTE — H&P
[facility] Medicine History and Physical    Primary Care Physician: JOHN Adhikari    Chief complaint   Episode of dysarthria.      History of Present Illness  71-year-old female brought into the ER at 4:30 PM from primary care's office secondary to episode of generalized weakness, associated numbness in both hands, dysarthria, vision problem, her blood pressure initially was 220/120.  Patient has been having similar episode for years, but as per family lately they have become worse.  She had total of 4 episodes in last 24 hours.  Patient does appear to have high anxiety levels-this episodes are associated with feeling as if hit by a heat wave, states she could hear everything but could not speak, associated with difficulty breathing.  No complain of any abnormal jerking of any of the extremities, the whole episode lasted for 15-20 minutes, and she usually feels tired after this episode but no confusion noted per family.  She is compliant with her blood pressure medication and it is unusual for her to have such a high blood pressure, she does complain of neck pain, posterior headache, no complain of associated fever or chills.  Her NIH scale was 0 in ER.  ROS  Review of Systems   Constitutional: Negative for activity change, fatigue and fever.   HENT: Negative for ear discharge, nosebleeds, sore throat and trouble swallowing.    Eyes: Negative for discharge and visual disturbance.   Respiratory: Negative for cough, shortness of breath and wheezing.    Cardiovascular: Negative for chest pain and palpitations.   Gastrointestinal: Negative for abdominal pain, blood in stool, diarrhea and vomiting.   Endocrine: Negative for cold intolerance, polydipsia and polyuria.   Genitourinary: Negative for dysuria, flank pain and hematuria.   Musculoskeletal: Positive for back pain. Negative for joint swelling and neck stiffness.   Skin: Negative for rash and wound.   Neurological: Negative for dizziness, seizures, syncope,  speech difficulty and headaches.   Hematological: Negative for adenopathy. Does not bruise/bleed easily.   Psychiatric/Behavioral: Negative for agitation and confusion. The patient is nervous/anxious.         Otherwise complete ROS is negative except as mentioned in the HPI.    Past Medical History:  Principal Problem:    TIA (transient ischemic attack)       Past Medical History:   Diagnosis Date   • Arthritis-back     • CHF (congestive heart failure)-not on any Diuretics, EF not known.      • Coronary artery disease-stress test approximately year back which was negative.      • Depression/anxiety     • Diabetes mellitus-2, on by mouth medications     • Disease of thyroid gland-hypothyroid     • Migraine      PVD-carotid vessels, followed by Dr. Handy with ultrasound every year.               Past Surgical History:  Past Surgical History:   Procedure Laterality Date   • CARDIAC SURGERY-CABG          Family History:   Mother-cervical cancer.    Social History:    reports that she has never smoked. She does not have any smokeless tobacco history on file. She reports that she does not drink alcohol or use illicit drugs.    Medications:  Prescriptions Prior to Admission   Medication Sig Dispense Refill Last Dose   • aspirin 81 MG EC tablet Take 81 mg by mouth Daily.      • atorvastatin (LIPITOR) 40 MG tablet Take 40 mg by mouth Daily.      • cetirizine (zyrTEC) 10 MG tablet Take 10 mg by mouth Daily.      • levothyroxine (SYNTHROID, LEVOTHROID) 50 MCG tablet Take 50 mcg by mouth Daily.      • lisinopril (PRINIVIL,ZESTRIL) 10 MG tablet Take 10 mg by mouth Daily.      • PARoxetine (PAXIL) 20 MG tablet Take 20 mg by mouth Every Morning.      • sitaGLIPtin-metFORMIN (JANUMET)  MG per tablet Take 1 tablet by mouth 2 (Two) Times a Day With Meals.        No Known Allergies        Physical Exam:   Temp:  [98.7 °F (37.1 °C)-99.6 °F (37.6 °C)] 98.7 °F (37.1 °C)  Heart Rate:  [89-92] 91  Resp:  [16-18] 16  BP:  "(168-175)/() 173/106  BP (!) 173/106 (BP Location: Right arm)  Pulse 91  Temp 98.7 °F (37.1 °C) (Oral)   Resp 16  Ht 68\" (172.7 cm)  Wt 158 lb 3.2 oz (71.8 kg)  SpO2 94%  BMI 24.05 kg/m2  VITALS-   AS ABOVE.  GENERAL- Not distressed, well nourished.  RS- CTA-BL, ,  No wheezing , no crackles, good effort.  CVS- s1s2 Regular, aortic murmur.  ABD- soft, non tender, not distended, no organomegaly. BS good.  EXT- no edema. Pulses + .  NEURO- AAO-3, power 5/5 in all ext, no gross sensory deficit, cranial nerves intact, clear speech, no visual field defect, Babinski downgoing, deep tendon reflexes 2+.  EYES- Conjunctivae are normal. Pupils are equal, round, and reactive to light. No scleral icterus.   ENT- no external ear nose lesions, mucosa moist.  NECK-  No tracheal deviation present. No thyromegaly present,No cervical lymphadenopathy.,  No neck stiffness.  JOINTS/MSK- no deformity, no swelling.  SKIN- no rash , warm to touch.  PSYCHIATRIC-  has a normal mood and affect.Thought content normal.           Results Reviewed:  I have personally reviewed current lab, radiology, and data and agree.  Lab Results   Component Value Date    AST 33 03/24/2017    ALT 30 03/24/2017     Lab Results   Component Value Date    WBC 6.49 03/24/2017    HGB 12.4 03/24/2017    HCT 38.7 03/24/2017    MCV 90.8 03/24/2017     03/24/2017     No results found for: CKTOTAL, CKMB, CKMBINDEX, TROPONINI, TROPONINT    Imaging Results (last 24 hours)     Procedure Component Value Units Date/Time    XR Chest 1 View [[id number]] Updated:  03/24/17 1902    CT Head Without Contrast Stroke Protocol [[id number]] Collected:  03/24/17 2031     Updated:  03/24/17 2032    Narrative:       EXAMINATION: CT HEAD WO CONTRAST  - 03/24/2017     INDICATION: Stroke protocol.      TECHNIQUE: CT scan of the head was performed at 5 mm intervals. No  intravenous contrast was utilized.      The radiation dose reduction device was turned on for each scan per " the  ALARA (As Low as Reasonably Achievable) protocol.     COMPARISON: None.     FINDINGS: There is no intra-axial mass. There is no hemorrhage. There is  no midline shift or extra-axial fluid collection. There is mild  calcification of the choroid plexus and fourth ventricle.       Impression:       There are no acute findings.     Code 19:   Time of Examination:  1724 hours  Time of Report to ER: 1727 hours     DICTATED:     03/24/2017  EDITED:         03/24/2017                    OLD RECORDS REVIEW BY ME.      Assessment/Plan   Weakness/dysarthria  -DD-panic attacks, TIA, hypertensive urgency.  -Follow-up stroke protocol, get MRI tonight, neuro checks, neurologic consult in a.m.    Hypertension  -Usually well controlled on lisinopril 10 mg, will continue that, holding parameters for systolic blood pressure more than 160.  -Troponin is negative EKG does not show any acute ST-T changes.    Hyperlipidemia  -Continue Lipitor 40 mg, check lipid panel.    DM 2  -Fingerstick coverage, hemoglobin A1c.    Hypothyroid  Depression/anxiety  -Continue her Paxil, patient states usually she gets quite anxious with any mental stress.  -Her CMP is still pending the need to follow-up on that.    DVT PXL  -tru's/scds  -lovenox      I discussed the patients findings and my recommendations with: patient/family.      Norah Nix MD  03/24/17  9:57 PM    Please note that portions of this note may have been completed with a voice recognition program. Efforts were made to edit the dictations, but occasionally words are mistranscribed.

## 2017-03-25 NOTE — PLAN OF CARE
Problem: Patient Care Overview (Adult)  Goal: Plan of Care Review  Outcome: Outcome(s) achieved Date Met:  03/25/17 03/25/17 1602   Coping/Psychosocial Response Interventions   Plan Of Care Reviewed With patient   Patient Care Overview   Progress improving   Outcome Evaluation   Outcome Summary/Follow up Plan Patient is at baseline function. She mobilizes safely with out any signs of previous problems. No skilled PT needed.         Problem: Inpatient Physical Therapy  Goal: Gait Training Goal LTG- PT  Outcome: Outcome(s) achieved Date Met:  03/25/17 03/25/17 1602   Gait Training PT LTG   Gait Training Goal PT LTG, Date Established 03/25/17   Gait Training Goal PT LTG, Time to Achieve 1 day   Gait Training Goal PT LTG, [place] Level supervision required   Gait Training Goal PT LTG, Distance to Achieve 350   Gait Training Goal PT LTG, Outcome goal met

## 2017-03-25 NOTE — THERAPY DISCHARGE NOTE
Acute Care - Occupational Therapy Initial Eval/Discharge  [facility]     Patient Name: Nette Montalvo  : 1945  MRN: [id number]  Today's Date: 3/25/2017  Onset of Illness/Injury or Date of Surgery Date: 17  Date of Referral to OT: 17  Referring Physician: MD Boyd      Admit Date: 3/24/2017       ICD-10-CM ICD-9-CM   1. Dysarthria R47.1 784.51   2. Altered mental status, unspecified R41.82 780.97   3. Left-sided weakness M62.81 728.87   4. Impaired mobility and ADLs Z74.09 799.89     Patient Active Problem List   Diagnosis   • TIA (transient ischemic attack)   • Dysarthria   • Paroxysmal spells     Past Medical History:   Diagnosis Date   • Arthritis    • CHF (congestive heart failure)    • Coronary artery disease    • Depression    • Diabetes mellitus    • Disease of thyroid gland    • Migraine    • Myocardial infarction    • Pneumonia      Past Surgical History:   Procedure Laterality Date   • CARDIAC SURGERY            OT ASSESSMENT FLOWSHEET (last 72 hours)      OT Evaluation       17 1343 17 1301 17 2200          Rehab Evaluation    Document Type  discharge evaluation/summary  -AC       Subjective Information  agree to therapy  -AC       Patient Effort, Rehab Treatment  good  -AC       General Information    Patient Profile Review  yes  -AC       Onset of Illness/Injury or Date of Surgery Date  17  -       Referring Physician  MD Boyd  -       General Observations  Pt received supine in bed. Dtr present in room   -AC       Pertinent History Of Current Problem  Pt admit from PCP office with Lsided weakness, facial droop, dysarthria, increased BP.  CT and MRI negative for CVA.  Pt reports she has been having these episodes since she was 6yrs old. Questioanble migraine/epilepsy. H/O L ankle fracture 2016  -       Precautions/Limitations  --   standard  -AC       Prior Level of Function  independent:;community mobility;ADL's  -AC       Equipment Currently  Used at Home  walker, rolling;wheelchair, motorized;commode;shower chair  -AC walker, standard  -TA      Plans/Goals Discussed With  patient and family;agreed upon  -AC       Risks Reviewed  patient and family:;change in vital signs  -AC       Benefits Reviewed  patient and family:;increase knowledge  -AC       Barriers to Rehab  hearing deficit   mild cognitive impairment  -AC       Living Environment    Lives With  alone  -AC alone  -TA      Living Arrangements  house  -AC house  -TA      Home Accessibility  ramps present at home;tub/shower is not walk in  -AC no concerns  -TA      Stair Railings at Home   none  -TA      Type of Financial/Environmental Concern   none  -TA      Transportation Available   car;family or friend will provide  -TA      Clinical Impression    Date of Referral to OT  03/25/17  -AC       OT Diagnosis  pt at baseline with self care and mobility  -AC       Patient/Family Goals Statement  pt would like to return home  -AC       Criteria for Skilled Therapeutic Interventions Met  no problems identified which require skilled intervention  -AC       Therapy Frequency  evaluation only  -AC       Anticipated Discharge Disposition home  -AC home  -AC       Functional Level Prior    Ambulation   0-->independent  -TA      Transferring   1-->assistive equipment  -TA      Toileting   0-->independent  -TA      Bathing   0-->independent  -TA      Dressing   0-->independent  -TA      Eating   0-->independent  -TA      Communication   0-->understands/communicates without difficulty  -TA      Swallowing   2-->difficulty swallowing liquids/foods   occass ionally  -TA      Vital Signs    Pre Systolic BP Rehab  144  -AC       Pre Treatment Diastolic BP  90  -AC       Post Systolic BP Rehab  150  -AC       Post Treatment Diastolic BP  72  -AC       Pretreatment Heart Rate (beats/min)  73  -AC       Posttreatment Heart Rate (beats/min)  74  -AC       Pre SpO2 (%)  94  -AC       O2 Delivery Pre Treatment  room  air  -AC       Post SpO2 (%)  97  -AC       O2 Delivery Post Treatment  room air  -AC       Pain Assessment    Pain Assessment  No/denies pain  -AC       Vision Assessment/Intervention    Visual Impairment  WNL  -AC       Cognitive Assessment/Intervention    Current Cognitive/Communication Assessment  --   some difficutly with answering info regarding home; dtr barron  -AC       Orientation Status  oriented x 4  -AC       Follows Commands/Answers Questions  100% of the time;needs cueing   pt required several cues to keep L arm still when taking BP  -AC       Personal Safety  WNL/WFL  -AC       Personal Safety Interventions  fall prevention program maintained;gait belt;nonskid shoes/slippers when out of bed  -AC       ROM (Range of Motion)    General ROM  no range of motion deficits identified  -       MMT (Manual Muscle Testing)    General MMT Assessment  no strength deficits identified  -       Bed Mobility, Assessment/Treatment    Bed Mob, Supine to Sit, [place]  independent  -AC       Bed Mob, Sit to Supine, [place]  independent  -AC       Transfer Assessment/Treatment    Transfers, Sit-Stand [place]  independent  -AC       Transfers, Stand-Sit [place]  independent  -       Functional Mobility    Functional Mobility- Ind. Level  independent  -       Functional Mobility- Device  --   gait belt  -AC       Functional Mobility-Distance (Feet)  --   in hallway  -AC       Lower Body Dressing Assessment/Training    LB Dressing Assess/Train, Clothing Type  doffing:;donning:;slipper socks  -AC       LB Dressing Assess/Train, Position  sitting  -AC       LB Dressing Assess/Train, [place]  independent  -       Sensory Assessment/Intervention    Sensory Impairment  --   WNL  -AC       Positioning and Restraints    Pre-Treatment Position  in bed  -AC       Post Treatment Position  bed  -AC       In Bed  supine;call light within reach;with family/caregiver  -         User Key  (r) = Recorded  By, (t) = Taken By, (c) = Cosigned By    Initials Name Effective Dates     Sarah Flood OT 06/23/15 -     JOHNIE Basurto RN 06/16/16 -           Occupational Therapy Education     Title: PT OT SLP Therapies (Done)     Topic: Occupational Therapy (Done)     Point: ADL training (Done)    Description: Instruct learner(s) on proper safety adaptation and remediation techniques during self care or transfers.   Instruct in proper use of assistive devices.    Learning Progress Summary    Learner Readiness Method Response Comment Documented by Status   Patient Acceptance E VU benefits of activity  03/25/17 1340 Done   Family Acceptance E VU benefits of activity  03/25/17 1340 Done                      User Key     Initials Effective Dates Name Provider Type Discipline     06/23/15 -  Sarah Flood OT Occupational Therapist OT                OT Recommendation and Plan  Anticipated Discharge Disposition: home  Therapy Frequency: evaluation only  Plan of Care Review  Plan Of Care Reviewed With: patient  Outcome Summary/Follow up Plan: OT eval complete. Pt with no new deficits and pt functioning at baseline with self care and mobility.  No skilled OT indicated.                 Outcome Measures       03/25/17 1301          How much help from another is currently needed...    Putting on and taking off regular lower body clothing? 4  -AC      Bathing (including washing, rinsing, and drying) 4  -AC      Toileting (which includes using toilet bed pan or urinal) 4  -AC      Putting on and taking off regular upper body clothing 4  -AC      Taking care of personal grooming (such as brushing teeth) 4  -AC      Eating meals 4  -AC      Score 24  -AC      Modified Daniel Scale    Modified Daniel Scale 0 - No Symptoms at all.  -AC      Functional Assessment    Outcome Measure Options AM-PAC 6 Clicks Daily Activity (OT);Modified [place]  -AC        User Key  (r) = Recorded By, (t) = Taken By, (c) = Cosigned By    Initials  Name Provider Type     Sarah Flood OT Occupational Therapist          Time Calculation:         Time Calculation- OT       03/25/17 1343          Time Calculation- OT    OT Start Time 1301  -AC      Total Timed Code Minutes- OT 0 minute(s)  -AC      OT Received On 03/25/17  -        User Key  (r) = Recorded By, (t) = Taken By, (c) = Cosigned By    Initials Name Provider Type     Sarah Flood OT Occupational Therapist          Therapy Charges for Today     Code Description Service Date Service Provider Modifiers Qty    [id number]  OT SELFCARE CURRENT 3/25/2017 Sarah Flood OT GO, [place] 1    [id number]  OT SELFCARE PROJECTED 3/25/2017 Sarah Flood OT GO, [place] 1    [id number]  OT SELFCARE DISCHARGE 3/25/2017 Sarah Flood OT GO, [place] 1    [id number]  OT EVAL LOW COMPLEXITY 4 3/25/2017 Sarah Flood OT GO 1          OT G-codes  Functional Assessment Tool Used: impact 6 clicks  Score: 24  Functional Limitation: Self care  Self Care Current Status (): 0 percent impaired, limited or restricted  Self Care Goal Status (): 0 percent impaired, limited or restricted  Self Care Discharge Status (): 0 percent impaired, limited or restricted    OT Discharge Summary  Anticipated Discharge Disposition: home  Reason for Discharge: All goals achieved  Discharge Destination: Home    Sarah Flood OT  3/25/2017

## 2017-03-25 NOTE — CONSULTS
"Inpatient consult to Neurology  Consult performed by: LUIS ANTONIO JEWELL  Consult ordered by: JUDE STAPLETON          Patient Care Team:  JOHN Adhikari as PCP - General (Family Medicine)    Disclaimer: This dictation was created using Dragon voice recognition software.  Despite best efforts of proof reading and spellchecking, it can happen that software mis- identification of words persist. The most frequent errors concern mis-identification of \"his\" and \"her\". However contextual reading should help solve this probblem for the reader. The second most common misidentification is \"\" for \"the patient\"      Chief complaint is visual problems and altered mental status    The following portions of the patient's history were reviewed and updated as appropriate:  allergies, current medications, past family history, past medical history, past social history, past surgical history and problem list.  Face-to-face time spent with the patient was used to discuss the presumptive diagnosis and differential diagnosis, prognosis and treatment and management options       Interval HPI, past medical, family history, social history, ROS, general physical and neurological exam are unchanged from 's note dated 03/24/2017, except as noted.      Subjective   The patient what is was admitted as an acute stroke at [facility] was not given according to the notes because of the patient's symptoms and blood pressure.  Upon questioning of the patient today by me it transpires that she has had these spells since age 6 and was diagnosed as a teenager with possible epilepsy but could not tolerate the medication because it CVR side effects and then did not continue taking medication for these episodes. She has had over 100 though these so far in her lifetime. He has never been left with any sequelae of those and the MRI of the brain was reviewed by me today and shows only white matter changes not out of the ordinary for a patient her " age.  Her spells initially consisted only of a fortification pattern in both eyes that obscured her vision. Around age 11 or 12 she also had problems with speech from the description she has developed word finding problems and episodes of aphasia during the spells. The episodes would last anywhere from 5 minutes to 2 hours.  Around the same time she also started developing weakness in both legs during this episodes.  However she never has loss of consciousness but she has problems remembering things and coming up with words. She does not always have a headache with these.    History of Present Illness    Review of Systems   Constitutional: Negative for appetite change, chills and fatigue.   HENT: Negative for congestion, ear pain, facial swelling and sinus pressure.    Eyes: Negative for pain and redness.   Respiratory: Negative for shortness of breath.    Cardiovascular: Negative for chest pain.   Gastrointestinal: Negative for abdominal pain.   Endocrine: Negative for cold intolerance and heat intolerance.   Genitourinary: Negative for dysuria.   Musculoskeletal: Negative for arthralgias.   Skin: Negative for rash.   Allergic/Immunologic: Negative for immunocompromised state.   Neurological: Positive for weakness.   Hematological: Negative for adenopathy.   Psychiatric/Behavioral: Negative for hallucinations.        Past Medical History:   Diagnosis Date   • Arthritis    • CHF (congestive heart failure)    • Coronary artery disease    • Depression    • Diabetes mellitus    • Disease of thyroid gland    • Migraine    • Myocardial infarction    • Pneumonia    ,   Past Surgical History:   Procedure Laterality Date   • CARDIAC SURGERY     , History reviewed. No pertinent family history.,   Social History   Substance Use Topics   • Smoking status: Never Smoker   • Smokeless tobacco: None   • Alcohol use No    and   Prescriptions Prior to Admission   Medication Sig Dispense Refill Last Dose   • aspirin 81 MG EC tablet  Take 81 mg by mouth Daily.      • atorvastatin (LIPITOR) 40 MG tablet Take 40 mg by mouth Daily.      • cetirizine (zyrTEC) 10 MG tablet Take 10 mg by mouth Daily.      • levothyroxine (SYNTHROID, LEVOTHROID) 50 MCG tablet Take 50 mcg by mouth Daily.      • lisinopril (PRINIVIL,ZESTRIL) 10 MG tablet Take 10 mg by mouth Daily.      • PARoxetine (PAXIL) 20 MG tablet Take 20 mg by mouth Every Morning.      • sitaGLIPtin-metFORMIN (JANUMET)  MG per tablet Take 1 tablet by mouth 2 (Two) Times a Day With Meals.          Objective      Vital Signs  Temp:  [98.2 °F (36.8 °C)-98.8 °F (37.1 °C)] 98.3 °F (36.8 °C)  Heart Rate:  [66-91] 69  Resp:  [16] 16  BP: (142-175)/() 146/82    Physical Exam  General appearance showed the patient in no acute distress  Carotid pulses were palpable bilaterally  The ophthalmological exam showed the refractory media clear, no blurring of disc margins, there were no hemorrhages noted, blood vessels appeared normal.  The patient was alert and oriented to person, place and time  Speech was intact, memory unimpaired  Cranial nerves II-XII appeared intact  Strength was 5/5 throughout  Reflexes were 2 throughout  There was no Babinski sign, no pathological reflexes  Muscle tone was normal  Sensation was normal for age  Station and gait were not tested    Results Review:    I reviewed the patient's new clinical results.  I reviewed the patient's new imaging results and agree with the interpretation.  I reviewed the patient's other test results and agree with the interpretation        Assessment/Plan     Principal Problem:    TIA (transient ischemic attack)  Active Problems:    Dysarthria    Paroxysmal spells      Assessment & Plan  Her spells a fairly stereotypical and since she had had 100s of those these are not stroke related issues.  It is possible that the patient has epilepsy but is also possible that these are migraine related events.  Therefore I will try Topamax because it  covers both issues and she will try 50 mg by mouth daily at bedtime.  If she continues to have spells on this medication it may have to be increased to 100 mg.  The frequency of her spells ranges from every day to up to 1 year without one.  We will also get an EEG to look for seizure tendency especially over the left side given her speech problem.  If her echocardiogram and carotid ultrasound do not show any specific abnormality, the patient could be discharged after her EEG with a neurology follow-up in 6-8 weeks to try to achieve reasonable blood levels.    Addendum: The EEG shows unspecific and left mid temporal changes in the MRI in that area appears normal. These findings can be seen in either migraine or epilepsy. The reading of the ultrasound of the carotids do not indicate significant stenosis and the preliminary reading on the echocardiogram does not appear to raise suspicion of a cardioembolic source.  From a neurological point of view if the echocardiogram report does not indicate the possibility of a cardioembolic source I recommend discharging the patient on 50 mg of Topamax bBID with follow-up with neurology in 6-8 weeks.  I discussed the patients findings and my recommendations with patient and family    Alonzo Duque MD  03/25/17  6:47 PM

## 2017-03-25 NOTE — PLAN OF CARE
Problem: Patient Care Overview (Adult)  Goal: Plan of Care Review  Outcome: Ongoing (interventions implemented as appropriate)    03/25/17 1105   Coping/Psychosocial Response Interventions   Plan Of Care Reviewed With patient;daughter   Patient Care Overview   Progress progress toward functional goals as expected

## 2017-03-25 NOTE — PROGRESS NOTES
Hospitalist Daily Progress Note    Date of Admission: 3/24/2017   LOS: 0 days   PCP: JOHN Adhikari    Chief Complaint: spells    Subjective    Feels fine now.  She is going comfortable and has no specific complaint.  No chest pain, palpitation or shortness of breath.  No fever or chills.  No nausea, vomiting, diarrhea, abdominal pain.  History of Present Illness    Review of Systems  General: no fevers, chills  Respiratory: no cough, dyspnea  Cardiovascular: no chest pain, palpitations  Abdomen: No abdominal pain, nausea, vomiting, or diarrhea  Neurologic: No focal weakness    Objective   Physical Exam:  I have reviewed the vital signs.  Temp:  [98.2 °F (36.8 °C)-99.6 °F (37.6 °C)] 98.3 °F (36.8 °C)  Heart Rate:  [66-92] 69  Resp:  [16-18] 16  BP: (142-175)/() 146/82    Objective  General Appearance:    Alert, cooperative, no distress  Head:    Normocephalic, atraumatic  Eyes:    Sclerae anicteric  Neck:   Supple, no mass  Lungs: Clear to auscultation bilaterally, respirations unlabored  Heart: Regular rate and rhythm, S1 and S2 normal, no murmur, rub or gallop  Abdomen:  Soft, non-tender, bowel sounds active, nondistended  Extremities: No clubbing, cyanosis, or edema to lower extremities  Skin: No rashes   Neurologic: Oriented x3, Normal strength to extremities    Results Review:    I have reviewed the labs, radiology results and diagnostic studies.      Results from last 7 days  Lab Units 03/24/17  1829   WBC 10*3/mm3 6.49   HEMOGLOBIN g/dL 12.4   PLATELETS 10*3/mm3 158       Results from last 7 days  Lab Units 03/24/17  1829   SODIUM mmol/L 145   POTASSIUM mmol/L 3.8   TOTAL CO2 mmol/L 23.0   CREATININE mg/dL 0.70   GLUCOSE mg/dL 132*       I have reviewed the medications.    ---------------------------------------------------------------------------------------------  Assessment/Plan   Assessment & Plan  Assessment/Problem List    - Principal Problem:  Episodic neurologic deficits including visual  changes, sometimes difficulty with speech, overall weakness.  These episodes have a long history evidently started when the patient was 8-10 years old.  Patient denies any workup for these.  However MRI done here on this admission reveals no abnormality except age-related changes.    - Coronary artery disease with history of MI, status post CABG.    - Diabetes mellitus, well controlled with hemoglobin A1c below 7.    - Hypothyroidism.    - Hypertension.    - Hyperlipidemia on Lipitor.    - Depression.  PLAN:  - Neurology evaluation.  - As discussed with the patient unless the studies show something significant that requires in-hospital management and treatment patient will be discharged home soon possibly tomorrow.  - Continue current care and medications.  NOTE: Had a long discussion with the patient and her daughter at the bedside.  Discussed the situation and plan of care.  Answered their many questions to their satisfaction's.  Total time spent face-to-face with the patient 45 minutes.        DVT prophylaxis:  Discharge Planning: I expect patient to be discharged to home in 1-2 days.    Carson Puente MD 03/25/17 12:59 PM

## 2017-03-25 NOTE — SIGNIFICANT NOTE
RN had called day shift APRN d/t pt having 'uncontrollable laughter' after a first time dose of Ceftin. RN advised to call RRT. VSS, patient oriented with confusion at times. Family stated patient had not slept in multiple days and appeared delirious. Patient upset with family for 'calling so many people into the room.' RRT cancelled per [facility]. Spoke with ANIL LOPEZ to update of stable condition and delirium. Family at bedside.    JOÃO Barger RN [facility]

## 2017-03-25 NOTE — PLAN OF CARE
Problem: Stroke (Ischemic) (Adult)  Goal: Signs and Symptoms of Listed Potential Problems Will be Absent or Manageable (Stroke)  Outcome: Ongoing (interventions implemented as appropriate)    03/25/17 0021   Stroke (Ischemic)   Problems Assessed (Stroke (Ischemic)/TIA) motor/sensory impairment;venous thromboembolism;muscle tone abnormal   Problems Present (Stroke (Ischemic)/TIA) motor/sensory impairment;venous thromboembolism;muscle tone abnormal

## 2017-03-25 NOTE — PLAN OF CARE
Problem: Patient Care Overview (Adult)  Goal: Plan of Care Review  Outcome: Ongoing (interventions implemented as appropriate)    03/25/17 1416   Coping/Psychosocial Response Interventions   Plan Of Care Reviewed With patient;daughter   Outcome Evaluation   Outcome Summary/Follow up Plan Noted cognitive deficits in the area of memory. Expressive language deficits noted c/b difficulty w/ word finding in conversation. Pt reports this is baseline and worsens during episodes. Pt presents as r/o migraines vs seizures. Rec: SLP f/u for compensatory stratgies for language and cog deficits since it impacts ADL.

## 2017-03-25 NOTE — THERAPY DISCHARGE NOTE
Acute Care - Physical Therapy Initial Eval/Discharge  [facility]     Patient Name: Nette Montalvo  : 1945  MRN: [id number]  Today's Date: 3/25/2017   Onset of Illness/Injury or Date of Surgery Date: 17  Date of Referral to PT: 17  Referring Physician: MD Boyd      Admit Date: 3/24/2017    Visit Dx:    ICD-10-CM ICD-9-CM   1. Dysarthria R47.1 784.51   2. Altered mental status, unspecified R41.82 780.97   3. Left-sided weakness M62.81 728.87   4. Impaired mobility and ADLs Z74.09 799.89   5. Cognitive communication deficit R41.841 799.52   6. Impaired functional mobility, balance, gait, and endurance Z74.09 V49.89     Patient Active Problem List   Diagnosis   • TIA (transient ischemic attack)   • Dysarthria   • Paroxysmal spells     Past Medical History:   Diagnosis Date   • Arthritis    • CHF (congestive heart failure)    • Coronary artery disease    • Depression    • Diabetes mellitus    • Disease of thyroid gland    • Migraine    • Myocardial infarction    • Pneumonia      Past Surgical History:   Procedure Laterality Date   • CARDIAC SURGERY            PT ASSESSMENT (last 72 hours)      PT Evaluation       17 1301 17 1300    Rehab Evaluation    Document Type discharge evaluation/summary  -AC discharge evaluation/summary  -SC    Subjective Information agree to therapy  -AC agree to therapy;no complaints  -SC    Patient Effort, Rehab Treatment good  -AC good  -SC    Symptoms Noted During/After Treatment  none  -SC    General Information    Patient Profile Review yes  -AC yes  -SC    Onset of Illness/Injury or Date of Surgery Date 17  -AC 17  -SC    Referring Physician MD Boyd  -AC MD Boyd  -SC    General Observations Pt received supine in bed. Dtr present in room   -AC in bed, face symetrical  -SC    Pertinent History Of Current Problem Pt admit from PCP office with Lsided weakness, facial droop, dysarthria, increased BP.  CT and MRI negative for CVA.  Pt reports she  has been having these episodes since she was 6yrs old. Questioanble migraine/epilepsy. H/O L ankle fracture January 2016  - Admitted with dysarthria, facial droop. r/o cva, possible complex migrane vs sz. Mri neg for infract. Awaiting other test  -SC    Precautions/Limitations --   standard  -     Prior Level of Function independent:;community mobility;ADL's  - independent:;community mobility  -SC    Equipment Currently Used at Home walker, rolling;wheelchair, motorized;commode;shower chair  - walker, standard   does not use  -SC    Plans/Goals Discussed With patient and family;agreed upon  - patient;patient and family  -SC    Risks Reviewed patient and family:;change in vital signs  - patient:;increased discomfort;change in vital signs  -SC    Benefits Reviewed patient and family:;increase knowledge  - patient:;improve function;increase independence;increase strength  -SC    Barriers to Rehab hearing deficit   mild cognitive impairment  - none identified  -SC    Living Environment    Lives With alone  - alone  -SC    Living Arrangements [place]  -[facility]  -SC    Home Accessibility ramps present at home;tub/shower is not walk in  - no concerns  -SC    Clinical Impression    Date of Referral to PT  03/24/17  -SC    Patient/Family Goals Statement  to get relean from spells  -SC    Criteria for Skilled Therapeutic Interventions Met  current level of function same as previous level of function;no   evaluation only  -SC    Pathology/Pathophysiology Noted (Describe Specifically for Each System)  other (see comments)   none  -SC    Impairments Found (describe specific impairments)  other (see comments)   none  -SC    Rehab Potential  good, to achieve stated therapy goals  -SC    Vital Signs    Pre Systolic BP Rehab 144  -  -SC    Pre Treatment Diastolic BP 90  -AC 90  -SC    Post Systolic BP Rehab 150  -  -SC    Post Treatment Diastolic BP 72  -AC 72  -SC    Pretreatment Heart Rate  (beats/min) 73  -AC 73  -SC    Posttreatment Heart Rate (beats/min) 74  -AC 74  -SC    Pre SpO2 (%) 94  -AC 94  -SC    O2 Delivery Pre Treatment room air  - room air  -SC    Post SpO2 (%) 97  -AC 97  -SC    O2 Delivery Post Treatment room air  -AC room air  -SC    Pain Assessment    Pain Assessment No/denies pain  - No/denies pain  -SC    Vision Assessment/Intervention    Visual Impairment WNL  - WNL  -SC    Cognitive Assessment/Intervention    Current Cognitive/Communication Assessment --   some difficutly with answering info regarding home; dtr barron  - functional  -SC    Orientation Status oriented x 4  -AC oriented x 4  -SC    Follows Commands/Answers Questions 100% of the time;needs cueing   pt required several cues to keep L arm still when taking BP  -% of the time  -SC    Personal Safety WNL/WFL  -AC WNL/WFL  -SC    Personal Safety Interventions fall prevention program maintained;gait belt;nonskid shoes/slippers when out of bed  - fall prevention program maintained  -SC    Cognitive Assessment Intervention    Behavior/Mood Observations  alert  -SC    Attention  WNL/WFL  -SC    ROM (Range of Motion)    General ROM no range of motion deficits identified  - no range of motion deficits identified  -SC    MMT (Manual Muscle Testing)    General MMT Assessment no strength deficits identified  - no strength deficits identified   symetrical strength  -SC    Muscle Tone Assessment    Muscle Tone Assessment  --   wnl  -SC    Bed Mobility, Assessment/Treatment    Bed Mob, Supine to Sit, [place] independent  - independent  -SC    Bed Mob, Sit to Supine, [place] independent  - independent  -SC    Transfer Assessment/Treatment    Transfers, Sit-Stand [place] independent  - independent  -SC    Transfers, Stand-Sit [place] independent  - independent  -SC    Gait Assessment/Treatment    Gait, [place] Level  supervision required   initially contact assist with some unsteadness   -SC    Gait, Distance (Feet)  350  -SC    Gait, Gait Pattern Analysis  swing-through gait  -SC    Gait, Gait Deviations  --   mild unsteadness  -SC    Gait, Comment  cues for walking in [place] and negotiating objects- fatigued at end of walk  -SC    Sensory Assessment/Intervention    Sensory Impairment --   WNL  -AC --   wnl  -SC    Positioning and Restraints    Pre-Treatment Position in bed  - in bed  -SC    Post Treatment Position bed  - bed  -SC    In Bed supine;call light within reach;with family/caregiver  - supine;notified nsg;call light within reach;with family/caregiver;encouraged to call for assist   may walk in [place] with family  -SC      03/25/17 1200 03/24/17 2200    Rehab Evaluation    Document Type evaluation  -     Subjective Information no complaints  -     Patient Effort, Rehab Treatment good  -     Symptoms Noted During/After Treatment none  -JA     General Information    Equipment Currently Used at Home  walker, standard  -TA    Living Environment    Lives With child(francia), adult  - alone  -    Living Arrangements  house  -    Home Accessibility  no concerns  -TA    Stair Railings at Home  none  -TA    Type of Financial/Environmental Concern  none  -TA    Transportation Available  car;family or friend will provide  -TA    Cognitive Assessment/Intervention    Current Cognitive/Communication Assessment impaired  -     Orientation Status oriented x 4  -     Follows Commands/Answers Questions 100% of the time  -     Additional Documentation Cognitive Assessment Intervention (Group)  -     Cognitive Assessment Intervention    Behavior/Mood Observations alert  -     Attention WNL/WFL  -       User Key  (r) = Recorded By, (t) = Taken By, (c) = Cosigned By    Initials Name Provider Type    SC William Isaac, PT Physical Therapist    JAISON Lugo CCC-SLP Speech and Language Pathologist    DANIEL Flood, OT Occupational Therapist    TA Cheyanne Basurto RN  Registered Nurse          Physical Therapy Education     Title: PT OT SLP Therapies (Done)     Topic: Physical Therapy (Done)     Point: Mobility training (Done)    Learning Progress Summary    Learner Readiness Method Response Comment Documented by Status   Patient Carrie RITTER GAVIN RANDOLPH reviewed benefits of activity SC 03/25/17 1602 Done               Point: Home exercise program (Done)    Learning Progress Summary    Learner Readiness Method Response Comment Documented by Status   Patient Carrie GAVIN CARBAJAL reviewed benefits of activity SC 03/25/17 1602 Done               Point: Body mechanics (Done)    Learning Progress Summary    Learner Readiness Method Response Comment Documented by Status   Patient EagGAVIN Flores reviewed benefits of activity SC 03/25/17 1602 Done               Point: Precautions (Done)    Learning Progress Summary    Learner Readiness Method Response Comment Documented by Status   Patient Carrie GAVIN CARBAJAL reviewed benefits of activity SC 03/25/17 1602 Done                      User Key     Initials Effective Dates Name Provider Type Discipline    SC 06/19/15 -  William Isaac, PT Physical Therapist PT                PT Recommendation and Plan  Anticipated Discharge Disposition: home with assist  Planned Therapy Interventions:  (none)  Plan of Care Review  Plan Of Care Reviewed With: patient  Progress: improving  Outcome Summary/Follow up Plan: Patient is at baseline function. She mobilizes safely with out any signs of previous problems. No skilled PT needed.          IP PT Goals       03/25/17 1602          Gait Training PT LTG    Gait Training Goal PT LTG, Date Established 03/25/17  -SC      Gait Training Goal PT LTG, Time to Achieve 1 day  -SC      Gait Training Goal PT LTG, [place] Level supervision required  -SC      Gait Training Goal PT LTG, Distance to Achieve 350  -SC      Gait Training Goal PT LTG, Outcome goal met  -SC        User Key  (r) = Recorded By, (t) = Taken By, (c) = Cosigned By     Initials Name Provider Type    DONALD Isaac, PT Physical Therapist                Outcome Measures       03/25/17 1305 03/25/17 1301       How much help from another person do you currently need...    Turning from your back to your side while in flat bed without using bedrails? 4  -SC      Moving from lying on back to sitting on the side of a flat bed without bedrails? 4  -SC      Moving to and from a bed to a chair (including a wheelchair)? 4  -SC      Standing up from a chair using your arms (e.g., wheelchair, bedside chair)? 4  -SC      Climbing 3-5 steps with a railing? 4  -SC      To walk in hospital room? 4  -SC      AM-PAC 6 Clicks Score 24  -SC      How much help from another is currently needed...    Putting on and taking off regular lower body clothing?  4  -AC     Bathing (including washing, rinsing, and drying)  4  -AC     Toileting (which includes using toilet bed pan or urinal)  4  -AC     Putting on and taking off regular upper body clothing  4  -AC     Taking care of personal grooming (such as brushing teeth)  4  -AC     Eating meals  4  -AC     Score  24  -AC     Modified Daniel Scale    Modified [place] Scale 0 - No Symptoms at all.  -SC 0 - No Symptoms at all.  -AC     Functional Assessment    Outcome Measure Options AM-PAC 6 Clicks Basic Mobility (PT);Modified [place]  -SC AM-PAC 6 Clicks Daily Activity (OT);Modified Daniel  -AC       User Key  (r) = Recorded By, (t) = Taken By, (c) = Cosigned By    Initials Name Provider Type    SC William Isaac, PT Physical Therapist    DANIEL Folod OT Occupational Therapist           Time Calculation:         PT Charges       03/25/17 1604          Time Calculation    Start Time 1300  -SC      PT Received On 03/25/17  -SC        User Key  (r) = Recorded By, (t) = Taken By, (c) = Cosigned By    Initials Name Provider Type    DONALD Isaac PT Physical Therapist          Therapy Charges for Today     Code Description Service Date Service Provider  Modifiers Qty    01035641062 HC PT EVAL LOW COMPLEXITY 4 3/25/2017 William Isaac, PT GP 1    32289992679 HC PT MOBILITY CURRENT 3/25/2017 William Isaac, PT GP, [place] 1    41871484679 HC PT MOBILITY PROJECTED 3/25/2017 William Isaac, PT GP, [place] 1    89049728380 HC PT MOBILITY DISCHARGE 3/25/2017 William Isaac, PT GP, [place] 1          PT G-Codes  Outcome Measure Options: AM-PAC 6 Clicks Basic Mobility (PT)  Score: 24  Functional Limitation: Mobility: Walking and moving around  Mobility: Walking and Moving Around Current Status (): 0 percent impaired, limited or restricted  Mobility: Walking and Moving Around Goal Status (): 0 percent impaired, limited or restricted  Mobility: Walking and Moving Around Discharge Status (): 0 percent impaired, limited or restricted    PT Discharge Summary  Anticipated Discharge Disposition: home with assist    William Isaac, PT  3/25/2017

## 2017-03-26 VITALS
HEIGHT: 68 IN | SYSTOLIC BLOOD PRESSURE: 159 MMHG | WEIGHT: 158.2 LBS | TEMPERATURE: 97.4 F | DIASTOLIC BLOOD PRESSURE: 70 MMHG | OXYGEN SATURATION: 94 % | HEART RATE: 63 BPM | BODY MASS INDEX: 23.98 KG/M2 | RESPIRATION RATE: 18 BRPM

## 2017-03-26 LAB
BUN BLDA-MCNC: 9 MG/DL (ref 8–26)
CA-I BLDA-SCNC: 1.21 MMOL/L (ref 1.2–1.32)
CHLORIDE BLDA-SCNC: 104 MMOL/L (ref 98–109)
CO2 BLDA-SCNC: 22 MMOL/L (ref 24–29)
CREAT BLDA-MCNC: 0.7 MG/DL (ref 0.6–1.3)
GLUCOSE BLDC GLUCOMTR-MCNC: 145 MG/DL (ref 70–130)
GLUCOSE BLDC GLUCOMTR-MCNC: 155 MG/DL (ref 70–130)
GLUCOSE BLDC GLUCOMTR-MCNC: 195 MG/DL (ref 70–130)
HCT VFR BLDA CALC: 38 % (ref 38–51)
HGB BLDA-MCNC: 12.9 G/DL (ref 12–17)
POTASSIUM BLDA-SCNC: 3.9 MMOL/L (ref 3.5–4.9)
SODIUM BLDA-SCNC: 143 MMOL/L (ref 138–146)

## 2017-03-26 PROCEDURE — 99213 OFFICE O/P EST LOW 20 MIN: CPT | Performed by: PSYCHIATRY & NEUROLOGY

## 2017-03-26 PROCEDURE — G0378 HOSPITAL OBSERVATION PER HR: HCPCS

## 2017-03-26 PROCEDURE — 99217 PR OBSERVATION CARE DISCHARGE MANAGEMENT: CPT | Performed by: NURSE PRACTITIONER

## 2017-03-26 PROCEDURE — 82962 GLUCOSE BLOOD TEST: CPT

## 2017-03-26 RX ORDER — LEVOFLOXACIN 500 MG/1
500 TABLET, FILM COATED ORAL ONCE
Status: COMPLETED | OUTPATIENT
Start: 2017-03-26 | End: 2017-03-26

## 2017-03-26 RX ORDER — LEVOFLOXACIN 500 MG/1
500 TABLET, FILM COATED ORAL DAILY
Qty: 4 TABLET | Refills: 0 | Status: SHIPPED | OUTPATIENT
Start: 2017-03-27 | End: 2017-08-29

## 2017-03-26 RX ORDER — TOPIRAMATE 50 MG/1
50 TABLET, FILM COATED ORAL EVERY 12 HOURS SCHEDULED
Qty: 60 TABLET | Refills: 1 | Status: SHIPPED | OUTPATIENT
Start: 2017-03-26

## 2017-03-26 RX ADMIN — FAMOTIDINE 20 MG: 20 TABLET ORAL at 08:58

## 2017-03-26 RX ADMIN — PAROXETINE HYDROCHLORIDE 20 MG: 20 TABLET, FILM COATED ORAL at 07:03

## 2017-03-26 RX ADMIN — LISINOPRIL 10 MG: 10 TABLET ORAL at 08:58

## 2017-03-26 RX ADMIN — TOPIRAMATE 50 MG: 25 TABLET, FILM COATED ORAL at 08:58

## 2017-03-26 RX ADMIN — LEVOFLOXACIN 500 MG: 500 TABLET, FILM COATED ORAL at 09:30

## 2017-03-26 RX ADMIN — DOCUSATE SODIUM 100 MG: 100 CAPSULE, LIQUID FILLED ORAL at 08:58

## 2017-03-26 RX ADMIN — ASPIRIN 81 MG: 81 TABLET, CHEWABLE ORAL at 08:58

## 2017-03-26 RX ADMIN — LEVOTHYROXINE SODIUM 50 MCG: 50 TABLET ORAL at 07:03

## 2017-03-26 NOTE — PROGRESS NOTES
"   LOS: 0 days   Patient Care Team:  JOHN Adhikari as PCP - General (Family Medicine)    Disclaimer: This dictation was created using Dragon voice recognition software.  Despite best efforts of proof reading and spellchecking, it can happen that software mis- identification of words persist. The most frequent errors concern mis-identification of \"his\" and \"her\". However contextual reading should help solve this probblem for the reader. The second most common misidentification is \"\" for \"the patient\"      Chief complaint is speech problems, altered mental status and the patient is seen today in follow-up    The following portions of the patient's history were reviewed and updated as appropriate:  allergies, current medications, past family history, past medical history, past social history, past surgical history and problem list.  Face-to-face time spent with the patient was used to discuss the presumptive diagnosis and differential diagnosis, prognosis and treatment and management options       Interval HPI, past medical, family history, social history, ROS, general physical and neurological exam are unchanged from my note dated 03/25/2017, except as noted.      Subjective     Extremity Weakness          Subjective  Patient is now back to her baseline. However speech therapy thought that the patient had some word finding difficulties as well as some concentration and possibly memory problems. Her echocardiogram apparently indicates severe aortic valve stenosis but this cannot account for the patient's symptoms.    History taken from: patient chart family    Objective     Vital Signs  Temp:  [97.4 °F (36.3 °C)-98.3 °F (36.8 °C)] 97.4 °F (36.3 °C)  Heart Rate:  [] 63  Resp:  [16-18] 18  BP: (134-227)/() 159/70    Objective  General appearance showed the patient in no acute distress  Carotid pulses were palpable bilaterally  The ophthalmological exam showed the refractory media clear, no " blurring of disc margins, there were no hemorrhages noted, blood vessels appeared normal.  The patient was alert and oriented to person, place and time  Speech was intact, memory unimpaired  Cranial nerves II-XII appeared intact  Strength was 5/5 throughout  Reflexes were 2 throughout  There was no Babinski sign, no pathological reflexes  Muscle tone was normal  Sensation was normal for age  Station and gait were not tested    Results Review:     I reviewed the patient's new clinical results.  I reviewed the patient's new imaging results and agree with the interpretation.  I reviewed the patient's other test results and agree with the interpretation    Assessment/Plan     Principal Problem:    TIA (transient ischemic attack)  Active Problems:    Dysarthria    Paroxysmal spells      Assessment & Plan  The patient's EEG showed unspecific changes over the left mid temporal section without any abnormality on MRI. These findings can be seen with a variety of other conditions such as migraines and after a seizure among others. From a neurological point of view the patient can be discharged today with follow-up in 2-3 months in our memory disorders clinic with Litzy Norwood. She is to continue taking Topamax 50 mg twice a day and with respect to her aortic stenosis I defer to the hospitalist service  Alonzo Duque MD  03/26/17  9:05 AM

## 2017-03-26 NOTE — PLAN OF CARE
Problem: Patient Care Overview (Adult)  Goal: Plan of Care Review  Outcome: Ongoing (interventions implemented as appropriate)    03/26/17 0403   Coping/Psychosocial Response Interventions   Plan Of Care Reviewed With patient   Patient Care Overview   Progress progress toward functional goals is gradual   Outcome Evaluation   Outcome Summary/Follow up Plan alert and oriented, exhausted, melatonin started pt tolerated well, slept through the night, son at bedside, htn better on ra,

## 2017-03-26 NOTE — DISCHARGE SUMMARY
[facility] Medicine Services  DISCHARGE SUMMARY       Date of Admission: 3/24/2017  Date of Discharge:  3/26/2017  Primary Care Physician: JOHN Adhikari    Discharge Diagnoses:  Active Hospital Problems (** Indicates Principal Problem)    Diagnosis Date Noted   • **TIA (transient ischemic attack) [G45.9] 03/24/2017   • Paroxysmal spells [R56.9] 03/25/2017   • Dysarthria [R47.1] 03/24/2017      Resolved Hospital Problems    Diagnosis Date Noted Date Resolved   No resolved problems to display.       Presenting Problem/History of Present Illness  Dysarthria [R47.1]      History of Present Illness on Day of Discharge: Pt is resting in bed in NAD. Family states she is doing better. She denies any soa, cp, fever, chills, or n/v/d.       Hospital Course/: This is a 71 y.o. female with a PMH of CHF, CAD with CABG, depression, anxiety, pvd, DM, hypothyroid and Migraine.  The pt was sent to the ER from her primary care office with complaints of generalized weakness, numbness in both hands, dysarthria, vision problems and hypertension of 220/120. Pt states she has had similar episodes since she was 8-10 years old. She has had 4 episodes in last 24 hours. Pt states she is complaint with her bp medication. CT of head showed no intra-axial mass. There is no hemorrhage. There is no midline shift or extra-axial fluid collection. There is mild calcification of the choroid plexus and fourth ventricle. Urine was positive for mod 2+ leuk esterase, wbc 6-12. MRI showed no acute finding.       Pt was admitted to hospital medicine and neurology was consulted. Pt was started on Ceftin for UTI. Pt had a EEG showed unspecific changes over the left mid temporal section without any abnormality on MRI. Pt was started on Topamax to cover for possible migraines and seizures. Pt has a RRT called on 3/25/17 pt bp was elevated to 209/114, she was confused and not acting like herself per family. Family states she  acted that way after she received her dose of Ceftin. Pt is back to her baseline this am per family. Will switch antibiotic to Levaquin and first dose was given this morning and pt has tolerated and not have any signs of an adverse reaction.        Pt will be discharged today on Levaquin 500 mg for 4 more days. Culture is not final on urine. Will have pt follow up with PCP in one week. Pt will continue the Topamax 50 mg BID. Pt will need to follow up with neurology in 2-3 months in  [facility] disorder clinic with [facility].  Follow up with Dr. Duque in 6-8 weeks.           Procedures Performed         Consults:   Consults     Date and Time Order Name Status Description    3/24/2017 2223 Inpatient consult to Neurology Completed     3/24/2017 1728 Inpatient Consult to Neurology (on call physician/group)      3/24/2017 1728 Consult Interventional Neurologist and/or Stroke Team            Pertinent Test Results:  Lab Results (last 24 hours)     Procedure Component Value Units Date/Time    POC Glucose Fingerstick [[id number]]  (Abnormal) Collected:  03/25/17 1141    Specimen:  Blood Updated:  03/25/17 1147     Glucose 183 (H) mg/dL     Narrative:       Meter: [id number] : [id number] Juancho Kim    POC Glucose Fingerstick [[id number]]  (Abnormal) Collected:  03/25/17 1650    Specimen:  Blood Updated:  03/25/17 1652     Glucose 170 (H) mg/dL     Narrative:       Meter: [id number] : [id number] Juancho Judy    POC Glucose Fingerstick [[id number]]  (Abnormal) Collected:  03/25/17 1849    Specimen:  Blood Updated:  03/25/17 1909     Glucose 238 (H) mg/dL     Narrative:       Meter: [id number] : [id number] Cecile Mohan    POC Glucose Fingerstick [[id number]]  (Abnormal) Collected:  03/25/17 2017    Specimen:  Blood Updated:  03/25/17 2019     Glucose 185 (H) mg/dL     Narrative:       Meter: [id number] : [id number] Ameya Stephen    POC Glucose Fingerstick [[id number]]  (Abnormal) Collected:  03/26/17 0709     Specimen:  Blood Updated:  03/26/17 0714     Glucose 145 (H) mg/dL     Narrative:       Meter: [id number] : [id number] Giorgio ROMAN    Urine Culture [[id number]]  (Abnormal) Collected:  03/24/17 2347    Specimen:  Urine from Urine, Clean Catch Updated:  03/26/17 0825     Urine Culture >100,000 CFU/mL Gram Negative Bacilli (A)        Imaging Results (last 24 hours)     Procedure Component Value Units Date/Time    MRI Brain Without Contrast [[id number]] Collected:  03/25/17 1156     Updated:  03/25/17 1235    Narrative:       EXAMINATION: MRI BRAIN WO CONTRAST - 03/24/2017     INDICATION: R47.1-Dysarthria and anarthria; R41.82-Altered mental  status, unspecified; M62.81-Muscle weakness (generalized). Stroke.      TECHNIQUE: Sagittal and axial images of the brain are displayed. No  intravenous contrast was utilized.     COMPARISON: CT scan of the head performed the same day.     FINDINGS: There is no intra-axial mass or hemorrhage. There are rare  periventricular white matter changes typical of aging. There is no  cerebellopontine angle mass. The pituitary gland is normal. There is no  acute infarct.       Impression:       Age-related change. No acute findings.     DICTATED:     03/25/2017  EDITED:         03/25/2017         This report was finalized on 3/25/2017 12:33 PM by Dr. Luis Alberto Wiggins MD.       XR Chest 1 View [[id number]] Collected:  03/25/17 1104     Updated:  03/25/17 1238    Narrative:          EXAMINATION: XR CHEST, SINGLE VIEW - 03/24/2017     INDICATION: Stroke protocol.     COMPARISON: None.     FINDINGS: There are postoperative cardiac changes. The heart is at the  upper limits of normal. The heart is radiographically compensated. There  is no acute inflammatory process, mass, or effusion.           Impression:       No acute findings.     DICTATED:     03/25/2017  EDITED:         03/25/2017     This report was finalized on 3/25/2017 12:36 PM by Dr. Luis Alberto Wiggins MD.           Procedure Component  "Value - Date/Time        Urine Culture [[id number]] (Abnormal) Collected: 03/24/17 9890       Lab Status: Preliminary result Specimen: Urine from Urine, Clean Catch Updated: 03/26/17 0825        Urine Culture >100,000 CFU/mL Gram Negative Bacilli (A)               Condition on Discharge:  stable    Physical Exam on Discharge:/70 (BP Location: Right arm, Patient Position: Lying)  Pulse 63  Temp 97.4 °F (36.3 °C) (Oral)   Resp 18  Ht 68\" (172.7 cm)  Wt 158 lb 3.2 oz (71.8 kg)  SpO2 94%  BMI 24.05 kg/m2  Physical Exam   Constitutional: She is oriented to person, place, and time. She appears well-developed and well-nourished. No distress.   Pt is resting in bed in NAD with family at bedside   HENT:   Head: Normocephalic.   Eyes: Pupils are equal, round, and reactive to light.   Neck: Normal range of motion. Neck supple.   Cardiovascular: Normal rate, regular rhythm and intact distal pulses.  Exam reveals no friction rub.    Murmur heard.  Sr on monitor    Pulmonary/Chest: Effort normal and breath sounds normal. No stridor. No respiratory distress. She has no wheezes. She has no rales.   Abdominal: Soft. Bowel sounds are normal. She exhibits no distension. There is no guarding.   Musculoskeletal: Normal range of motion. She exhibits no edema.   Neurological: She is alert and oriented to person, place, and time.   Skin: Skin is warm and dry. She is not diaphoretic. There is pallor.   Psychiatric: She has a normal mood and affect. Her behavior is normal. Judgment and thought content normal.   Vitals reviewed.        Discharge Disposition  Home or Self Care    Discharge Medications   Nette Montalvo   Home Medication Instructions AMOR:[id number]    Printed on:03/26/17 1132   Medication Information                      aspirin 81 MG EC tablet  Take 81 mg by mouth Daily.             atorvastatin (LIPITOR) 40 MG tablet  Take 40 mg by mouth Daily.             cetirizine (zyrTEC) 10 MG tablet  Take 10 mg by mouth " Daily.             levoFLOXacin (LEVAQUIN) 500 MG tablet  Take 1 tablet by mouth Daily.             levothyroxine (SYNTHROID, LEVOTHROID) 50 MCG tablet  Take 50 mcg by mouth Daily.             lisinopril (PRINIVIL,ZESTRIL) 10 MG tablet  Take 10 mg by mouth Daily.             PARoxetine (PAXIL) 20 MG tablet  Take 20 mg by mouth Every Morning.             sitaGLIPtin-metFORMIN (JANUMET)  MG per tablet  Take 1 tablet by mouth 2 (Two) Times a Day With Meals.             topiramate (TOPAMAX) 50 MG tablet  Take 1 tablet by mouth Every 12 (Twelve) Hours.                 Discharge Diet:   Diet Instructions     Diet: Consistent Carbohydrate; Thin Liquids, No Restrictions       Discharge Diet:  Consistent Carbohydrate   Fluid Consistency:  Thin Liquids, No Restrictions                 Discharge Care Plan / Instructions:    Activity at Discharge:   Activity Instructions     Activity as Tolerated           Measure Blood Pressure                     Follow-up Appointments  No future appointments.  Additional Instructions for the Follow-ups that You Need to Schedule     Additional Discharge Follow-Up (Specify Provider)    As directed    To:  [facility]   Follow Up Details:  2-3 months       Discharge Follow-Up With Specified Provider    As directed    To:  Dr. Duque in 6-8 weeks       Discharge Follow-up with PCP    As directed    Follow Up Details:  follow up with pcp in one week                 Test Results Pending at Discharge   Order Current Status    Urine Culture Preliminary result           JOHN Hassan 03/26/17 11:32 AM    Time: Discharge 45 min    Please note that portions of this note may have been completed with a voice recognition program. Efforts were made to edit the dictations, but occasionally words are mistranscribed.

## 2017-03-27 LAB — BACTERIA SPEC AEROBE CULT: ABNORMAL

## 2017-04-17 ENCOUNTER — DOCUMENTATION (OUTPATIENT)
Dept: CARDIOLOGY | Facility: HOSPITAL | Age: 72
End: 2017-04-17

## 2017-04-17 NOTE — PROGRESS NOTES
Dr. Turner had requested to see patient in [facility] to follow up with Aortic Stenosis/ echo done during hospital stay in March.  When I spoke with patient's daughter regarding the appointment, she stated Ms. Montalvo is a patient of Dr. Scotty Maldonado @ [facility].  She asked that I send patient's records there.  I did so, and Dr. Maldonado's office let me know that patient is set up to come in to see him in clinic on 4/26/17.  Per daughter's request, in addition to echo report, I sent DC summary, H/P, carotid ultrasound, and Neurology consult note from March hospital stay @ [facility]..

## 2017-06-07 ENCOUNTER — TRANSCRIBE ORDERS (OUTPATIENT)
Dept: MAMMOGRAPHY | Facility: HOSPITAL | Age: 72
End: 2017-06-07

## 2017-06-07 DIAGNOSIS — Z13.9 SCREENING: Primary | ICD-10-CM

## 2017-06-09 ENCOUNTER — HOSPITAL ENCOUNTER (OUTPATIENT)
Dept: MAMMOGRAPHY | Facility: HOSPITAL | Age: 72
Discharge: HOME | End: 2017-06-09
Admitting: INTERNAL MEDICINE

## 2017-06-09 DIAGNOSIS — Z13.9 SCREENING: ICD-10-CM

## 2017-06-09 PROCEDURE — 77063 BREAST TOMOSYNTHESIS BI: CPT

## 2017-06-09 PROCEDURE — G0202 SCR MAMMO BI INCL CAD: HCPCS

## 2017-08-29 RX ORDER — LANOLIN ALCOHOL/MO/W.PET/CERES
1000 CREAM (GRAM) TOPICAL DAILY
COMMUNITY

## 2017-08-29 RX ORDER — GLUCOSAMINE/D3/BOSWELLIA SERRA 1500MG-400
1 TABLET ORAL DAILY
COMMUNITY
End: 2018-08-14 | Stop reason: HOSPADM

## 2017-08-29 RX ORDER — ERGOCALCIFEROL 1.25 MG/1
50000 CAPSULE ORAL WEEKLY
COMMUNITY
End: 2018-08-14 | Stop reason: HOSPADM

## 2017-09-12 ENCOUNTER — ANESTHESIA EVENT (OUTPATIENT)
Dept: PERIOP | Facility: HOSPITAL | Age: 72
End: 2017-09-12

## 2017-09-12 ENCOUNTER — ANESTHESIA (OUTPATIENT)
Dept: PERIOP | Facility: HOSPITAL | Age: 72
End: 2017-09-12

## 2017-09-12 ENCOUNTER — HOSPITAL ENCOUNTER (OUTPATIENT)
Facility: HOSPITAL | Age: 72
Setting detail: HOSPITAL OUTPATIENT SURGERY
Discharge: HOME | End: 2017-09-12
Attending: OPHTHALMOLOGY | Admitting: OPHTHALMOLOGY

## 2017-09-12 VITALS
WEIGHT: 145 LBS | BODY MASS INDEX: 25.69 KG/M2 | HEART RATE: 70 BPM | TEMPERATURE: 96.8 F | RESPIRATION RATE: 16 BRPM | DIASTOLIC BLOOD PRESSURE: 43 MMHG | OXYGEN SATURATION: 95 % | HEIGHT: 63 IN | SYSTOLIC BLOOD PRESSURE: 114 MMHG

## 2017-09-12 PROBLEM — H26.9 CATARACT, LEFT EYE: Status: ACTIVE | Noted: 2017-09-12

## 2017-09-12 PROBLEM — H26.9 CATARACT, LEFT EYE: Status: RESOLVED | Noted: 2017-09-12 | Resolved: 2017-09-12

## 2017-09-12 LAB — GLUCOSE BLDC GLUCOMTR-MCNC: 95 MG/DL (ref 70–130)

## 2017-09-12 PROCEDURE — V2632 POST CHMBR INTRAOCULAR LENS: HCPCS | Performed by: OPHTHALMOLOGY

## 2017-09-12 PROCEDURE — 82962 GLUCOSE BLOOD TEST: CPT

## 2017-09-12 PROCEDURE — 25010000002 EPINEPHRINE 1 MG/ML SOLUTION 1 ML VIAL: Performed by: OPHTHALMOLOGY

## 2017-09-12 PROCEDURE — 25010000002 MIDAZOLAM PER 1 MG: Performed by: NURSE ANESTHETIST, CERTIFIED REGISTERED

## 2017-09-12 DEVICE — LENS ACRYSOF IQ 6X13MM SN60WF 17.0: Type: IMPLANTABLE DEVICE | Site: EYE | Status: FUNCTIONAL

## 2017-09-12 RX ORDER — SODIUM CHLORIDE, SODIUM LACTATE, POTASSIUM CHLORIDE, CALCIUM CHLORIDE 600; 310; 30; 20 MG/100ML; MG/100ML; MG/100ML; MG/100ML
1000 INJECTION, SOLUTION INTRAVENOUS CONTINUOUS PRN
Status: DISCONTINUED | OUTPATIENT
Start: 2017-09-12 | End: 2017-09-12 | Stop reason: HOSPADM

## 2017-09-12 RX ORDER — PREDNISOLONE ACETATE 10 MG/ML
1 SUSPENSION/ DROPS OPHTHALMIC
Status: DISCONTINUED | OUTPATIENT
Start: 2017-09-12 | End: 2017-09-12 | Stop reason: HOSPADM

## 2017-09-12 RX ORDER — TETRACAINE HYDROCHLORIDE 5 MG/ML
SOLUTION OPHTHALMIC
Status: COMPLETED
Start: 2017-09-12 | End: 2017-09-12

## 2017-09-12 RX ORDER — TETRACAINE HYDROCHLORIDE 5 MG/ML
1 SOLUTION OPHTHALMIC SEE ADMIN INSTRUCTIONS
Status: COMPLETED | OUTPATIENT
Start: 2017-09-12 | End: 2017-09-12

## 2017-09-12 RX ORDER — PHENYLEPHRINE HYDROCHLORIDE 100 MG/ML
1 SOLUTION/ DROPS OPHTHALMIC
Status: COMPLETED | OUTPATIENT
Start: 2017-09-12 | End: 2017-09-12

## 2017-09-12 RX ORDER — SODIUM CHLORIDE 0.9 % (FLUSH) 0.9 %
1-10 SYRINGE (ML) INJECTION AS NEEDED
Status: DISCONTINUED | OUTPATIENT
Start: 2017-09-12 | End: 2017-09-12 | Stop reason: HOSPADM

## 2017-09-12 RX ORDER — PILOCARPINE HYDROCHLORIDE 10 MG/ML
SOLUTION/ DROPS OPHTHALMIC AS NEEDED
Status: DISCONTINUED | OUTPATIENT
Start: 2017-09-12 | End: 2017-09-12 | Stop reason: HOSPADM

## 2017-09-12 RX ORDER — ENALAPRILAT 2.5 MG/2ML
1.25 INJECTION INTRAVENOUS ONCE AS NEEDED
Status: DISCONTINUED | OUTPATIENT
Start: 2017-09-12 | End: 2017-09-12 | Stop reason: HOSPADM

## 2017-09-12 RX ORDER — PHENYLEPHRINE HYDROCHLORIDE 100 MG/ML
SOLUTION/ DROPS OPHTHALMIC
Status: COMPLETED
Start: 2017-09-12 | End: 2017-09-12

## 2017-09-12 RX ORDER — SODIUM CHLORIDE 0.9 % (FLUSH) 0.9 %
3 SYRINGE (ML) INJECTION AS NEEDED
Status: DISCONTINUED | OUTPATIENT
Start: 2017-09-12 | End: 2017-09-12 | Stop reason: HOSPADM

## 2017-09-12 RX ORDER — CYCLOPENTOLATE HYDROCHLORIDE 20 MG/ML
1 SOLUTION/ DROPS OPHTHALMIC
Status: COMPLETED | OUTPATIENT
Start: 2017-09-12 | End: 2017-09-12

## 2017-09-12 RX ORDER — MIDAZOLAM HYDROCHLORIDE 1 MG/ML
INJECTION INTRAMUSCULAR; INTRAVENOUS AS NEEDED
Status: DISCONTINUED | OUTPATIENT
Start: 2017-09-12 | End: 2017-09-12 | Stop reason: SURG

## 2017-09-12 RX ORDER — PREDNISOLONE ACETATE 10 MG/ML
SUSPENSION/ DROPS OPHTHALMIC AS NEEDED
Status: DISCONTINUED | OUTPATIENT
Start: 2017-09-12 | End: 2017-09-12 | Stop reason: HOSPADM

## 2017-09-12 RX ORDER — BALANCED SALT SOLUTION 6.4; .75; .48; .3; 3.9; 1.7 MG/ML; MG/ML; MG/ML; MG/ML; MG/ML; MG/ML
SOLUTION OPHTHALMIC AS NEEDED
Status: DISCONTINUED | OUTPATIENT
Start: 2017-09-12 | End: 2017-09-12 | Stop reason: HOSPADM

## 2017-09-12 RX ORDER — PREDNISOLONE ACETATE 10 MG/ML
SUSPENSION/ DROPS OPHTHALMIC
Status: DISCONTINUED
Start: 2017-09-12 | End: 2017-09-12 | Stop reason: HOSPADM

## 2017-09-12 RX ORDER — CYCLOPENTOLATE HYDROCHLORIDE 20 MG/ML
SOLUTION/ DROPS OPHTHALMIC
Status: COMPLETED
Start: 2017-09-12 | End: 2017-09-12

## 2017-09-12 RX ORDER — LIDOCAINE HYDROCHLORIDE 40 MG/ML
INJECTION, SOLUTION RETROBULBAR; TOPICAL AS NEEDED
Status: DISCONTINUED | OUTPATIENT
Start: 2017-09-12 | End: 2017-09-12 | Stop reason: HOSPADM

## 2017-09-12 RX ADMIN — CYCLOPENTOLATE HYDROCHLORIDE 1 DROP: 20 SOLUTION/ DROPS OPHTHALMIC at 07:27

## 2017-09-12 RX ADMIN — PHENYLEPHRINE HYDROCHLORIDE 1 DROP: 100 SOLUTION/ DROPS OPHTHALMIC at 07:27

## 2017-09-12 RX ADMIN — MIDAZOLAM HYDROCHLORIDE 0.5 MG: 1 INJECTION, SOLUTION INTRAMUSCULAR; INTRAVENOUS at 08:36

## 2017-09-12 RX ADMIN — CYCLOPENTOLATE HYDROCHLORIDE 1 DROP: 20 SOLUTION/ DROPS OPHTHALMIC at 07:37

## 2017-09-12 RX ADMIN — TETRACAINE HYDROCHLORIDE 1 DROP: 5 SOLUTION OPHTHALMIC at 07:25

## 2017-09-12 RX ADMIN — SODIUM CHLORIDE, POTASSIUM CHLORIDE, SODIUM LACTATE AND CALCIUM CHLORIDE 500 ML: 600; 310; 30; 20 INJECTION, SOLUTION INTRAVENOUS at 07:49

## 2017-09-12 RX ADMIN — PHENYLEPHRINE HYDROCHLORIDE 1 DROP: 100 SOLUTION/ DROPS OPHTHALMIC at 07:37

## 2017-09-12 RX ADMIN — MIDAZOLAM HYDROCHLORIDE 1 MG: 1 INJECTION, SOLUTION INTRAMUSCULAR; INTRAVENOUS at 08:29

## 2017-09-12 RX ADMIN — TETRACAINE HYDROCHLORIDE 1 DROP: 5 SOLUTION OPHTHALMIC at 07:26

## 2017-09-12 RX ADMIN — PHENYLEPHRINE HYDROCHLORIDE 1 DROP: 100 SOLUTION/ DROPS OPHTHALMIC at 07:32

## 2017-09-12 RX ADMIN — CYCLOPENTOLATE HYDROCHLORIDE 1 DROP: 20 SOLUTION/ DROPS OPHTHALMIC at 07:32

## 2017-09-12 RX ADMIN — MIDAZOLAM HYDROCHLORIDE 0.5 MG: 1 INJECTION, SOLUTION INTRAMUSCULAR; INTRAVENOUS at 08:33

## 2017-09-12 NOTE — ANESTHESIA POSTPROCEDURE EVALUATION
Patient: Nette Montalvo    Procedure Summary     Date Anesthesia Start Anesthesia Stop Room / Location    09/12/17 0822 0847  [facility]       Procedure Diagnosis Surgeon Provider    CATARACT PHACO EXTRACTION WITH INTRAOCULAR LENS IMPLANT LEFT (Left Eye) No diagnosis on file. MD Tootie Munoz CRNA          Anesthesia Type: MAC  Last vitals  BP   117/53 (09/12/17 0851)    Temp   96.8 °F (36 °C) (09/12/17 0851)    Pulse   74 (09/12/17 0851)   Resp   16 (09/12/17 0851)    SpO2   94 % (09/12/17 0851)      Post Anesthesia Care and Evaluation    Patient location during evaluation: PHASE II  Patient participation: complete - patient participated  Level of consciousness: awake and alert  Pain score: 0  Pain management: satisfactory to patient  Airway patency: patent  Anesthetic complications: No anesthetic complications  PONV Status: none  Cardiovascular status: acceptable and stable  Respiratory status: acceptable  Hydration status: acceptable

## 2017-09-12 NOTE — OP NOTE
OPERATIVE NOTE    DATE OF PROCEDURE: 9/12/2017  PATIENT NAME: Nette Montalvo  PATIENT MRN: [id number]  YOB: 1945     PREOPERATIVE DIAGNOSIS: Left nuclear sclerotic cataract.     POSTOPERATIVE DIAGNOSIS: Left nuclear sclerotic cataract.     PROCEDURE PERFORMED: Phacoemulsification with implantation of a 17.0 diopter foldable posterior chamber intraocular lens, Left eye.     SURGEON: Judy Clifton MD      INDICATIONS: The patient is an 72 y.o. female with a Left nuclear sclerotic cataract with a preoperative visual acuity of 20/60. The patient desires better vision and is brought to the operating room at this time for elective cataract extraction with planned IOL.      DESCRIPTION OF PROCEDURE: The patient was brought to the operating room in the fasting state. Once all the vital signs were established to be within normal limits and supplemental oxygen was given, the area of the operative eye was prepped and draped in the usual fashion. A wire lid speculum was inserted into the cul-de-sac. Additional topical anesthetic drops were instilled. A fornix-based conjunctival flap was performed from the 11-o'clock to 1-o'clock position. A stab incision was made in the peripheral cornea with the 0.8mm stab blade. Next 0.3 mL of 1% unpreserved Xylocaine was injected in the anterior chamber. The anterior chamber was then entered with a 2.4 mm keratome blade and then under viscoelastic a capsulotomy was performed using the disposable cystotome in a continuous curvilinear fashion. The anterior capsule was then removed and the phacoemulsification handpiece was then introduced into the anterior segment and the nucleus was emulsified without difficulty. The CDE was 6.55. Once this was accomplished, the irrigation and aspiration handpiece was then used to aspirate the residual cortex. The posterior capsule was cleaned of any residual material and then polished with the irrigation and aspiration handpiece  and the viscoelastic was used to inflate the capsular bag. The Spencer AcrySof implant was then loaded into the microcartridge. It measured 17.0 diopters. The injector was then introduced into the capsular bag and the implant was slowly implanted without difficulty. The lens was then rotated to the appropriate axis and the viscoelastic was aspirated. The conjunctiva was then repositioned. Topical Betoptic-S, pilocarpine, and Pred-Forte drops were applied. Polysporin ointment was then instilled. There were no anesthetic or surgical difficulties. The patient tolerated the procedure very well and was returned to same day surgery in satisfactory condition.       Implant Name Type Inv. Item Serial No.  Lot No. LRB No. Used   LENS ACRYSOF IQ 6X13MM SN60WF 17.0 - [id number] - [id number] Implant LENS ACRYSOF IQ 6X13MM SN60WF 17.0 [id number] SPENCER   Left 1           Judy Clifton MD  9/12/2017

## 2017-09-12 NOTE — ANESTHESIA PREPROCEDURE EVALUATION
Anesthesia Evaluation     Patient summary reviewed and Nursing notes reviewed   history of anesthetic complications: PONV  NPO Solid Status: > 8 hours  NPO Liquid Status: > 8 hours     Airway   Mallampati: II  TM distance: >3 FB  Neck ROM: full  no difficulty expected  Dental    (+) upper dentures and lower dentures    Pulmonary - normal exam   (+) pneumonia resolved ,   (-) not a smoker  Cardiovascular - normal exam  Exercise tolerance: good (4-7 METS)    PT is on anticoagulation therapy    (+) hypertension well controlled, past MI  >12 months, CAD, CABG, CHF, hyperlipidemia      Neuro/Psych  (+) seizures (related to migraines, has not had one in a long time), TIA, headaches (migraines), psychiatric history Depression,    GI/Hepatic/Renal/Endo    (+)  diabetes mellitus (FSBS 95) well controlled,     Musculoskeletal     Abdominal    Substance History - negative use     OB/GYN          Other   (+) arthritis                                     Anesthesia Plan    ASA 3     MAC   (Risks and benefits discussed including risk of aspiration, recall and dental damage. All patient questions answered. Will continue with POC.)  intravenous induction   Anesthetic plan and risks discussed with patient.

## 2018-02-07 ENCOUNTER — HOSPITAL ENCOUNTER (EMERGENCY)
Facility: HOSPITAL | Age: 73
Discharge: HOME | End: 2018-02-07
Attending: EMERGENCY MEDICINE | Admitting: EMERGENCY MEDICINE

## 2018-02-07 ENCOUNTER — APPOINTMENT (OUTPATIENT)
Dept: GENERAL RADIOLOGY | Facility: HOSPITAL | Age: 73
End: 2018-02-07

## 2018-02-07 VITALS
HEIGHT: 63 IN | RESPIRATION RATE: 18 BRPM | TEMPERATURE: 98.6 F | SYSTOLIC BLOOD PRESSURE: 186 MMHG | HEART RATE: 75 BPM | WEIGHT: 143 LBS | BODY MASS INDEX: 25.34 KG/M2 | OXYGEN SATURATION: 95 % | DIASTOLIC BLOOD PRESSURE: 77 MMHG

## 2018-02-07 DIAGNOSIS — S42.291A HUMERAL HEAD FRACTURE, RIGHT, CLOSED, INITIAL ENCOUNTER: Primary | ICD-10-CM

## 2018-02-07 PROCEDURE — 90715 TDAP VACCINE 7 YRS/> IM: CPT | Performed by: PHYSICIAN ASSISTANT

## 2018-02-07 PROCEDURE — 73060 X-RAY EXAM OF HUMERUS: CPT

## 2018-02-07 PROCEDURE — 99284 EMERGENCY DEPT VISIT MOD MDM: CPT

## 2018-02-07 PROCEDURE — 73030 X-RAY EXAM OF SHOULDER: CPT

## 2018-02-07 PROCEDURE — 90471 IMMUNIZATION ADMIN: CPT | Performed by: PHYSICIAN ASSISTANT

## 2018-02-07 PROCEDURE — 25010000002 TDAP 5-2.5-18.5 LF-MCG/0.5 SUSPENSION: Performed by: PHYSICIAN ASSISTANT

## 2018-02-07 RX ORDER — HYDROCODONE BITARTRATE AND ACETAMINOPHEN 10; 325 MG/1; MG/1
1 TABLET ORAL ONCE
Status: COMPLETED | OUTPATIENT
Start: 2018-02-07 | End: 2018-02-07

## 2018-02-07 RX ORDER — HYDROCODONE BITARTRATE AND ACETAMINOPHEN 10; 325 MG/1; MG/1
1 TABLET ORAL EVERY 4 HOURS PRN
Qty: 12 TABLET | Refills: 0 | Status: SHIPPED | OUTPATIENT
Start: 2018-02-07 | End: 2018-08-13

## 2018-02-07 RX ADMIN — TETANUS TOXOID, REDUCED DIPHTHERIA TOXOID AND ACELLULAR PERTUSSIS VACCINE, ADSORBED 0.5 ML: 5; 2.5; 8; 8; 2.5 SUSPENSION INTRAMUSCULAR at 19:58

## 2018-02-07 RX ADMIN — HYDROCODONE BITARTRATE AND ACETAMINOPHEN 1 TABLET: 10; 325 TABLET ORAL at 20:08

## 2018-02-08 ENCOUNTER — OFFICE VISIT (OUTPATIENT)
Dept: ORTHOPEDIC SURGERY | Facility: CLINIC | Age: 73
End: 2018-02-08

## 2018-02-08 VITALS — BODY MASS INDEX: 27.42 KG/M2 | WEIGHT: 149 LBS | HEIGHT: 62 IN | RESPIRATION RATE: 18 BRPM

## 2018-02-08 DIAGNOSIS — S49.91XA SHOULDER INJURY, RIGHT, INITIAL ENCOUNTER: ICD-10-CM
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
## 2018-02-08 DIAGNOSIS — S42.201A CLOSED FRACTURE OF PROXIMAL END OF RIGHT HUMERUS, UNSPECIFIED FRACTURE MORPHOLOGY, INITIAL ENCOUNTER: ICD-10-CM

## 2018-02-08 PROCEDURE — 99203 OFFICE O/P NEW LOW 30 MIN: CPT | Performed by: PHYSICIAN ASSISTANT

## 2018-02-08 RX ORDER — SULFAMETHOXAZOLE AND TRIMETHOPRIM 800; 160 MG/1; MG/1
TABLET ORAL
COMMUNITY
Start: 2017-11-28 | End: 2018-08-13

## 2018-02-08 RX ORDER — BUSPIRONE HYDROCHLORIDE 5 MG/1
5 TABLET ORAL
COMMUNITY
Start: 2017-11-28 | End: 2022-06-08

## 2018-02-08 NOTE — ED PROVIDER NOTES
Subjective   HPI Comments: 72-year-old female presents after a fall.  She is complaining of right shoulder and arm pain.  She states that she was leaving her cousin's house and fell walking down the steps hitting her right shoulder and arm on the concrete.  She scraped her right hand and right knee but no loss of consciousness.  She states she has pain in her right shoulder when attempting to move.    Patient is a 72 y.o. female presenting with fall.   History provided by:  Patient   used: No    Fall   Mechanism of injury: fall    Injury location:  Shoulder/arm  Shoulder/arm injury location:  R shoulder and R arm  Incident location:  Outdoors  Time since incident:  30 minutes  Arrived directly from scene: yes    Fall:     Fall occurred:  Down stairs    Height of fall:  Standing    Impact surface:  [place]    Point of impact: right shoulder.    Entrapped after fall: no    Suspicion of alcohol use: no    Suspicion of drug use: no    Tetanus status:  Out of date      Review of Systems   Musculoskeletal:        Right shoulder pain   All other systems reviewed and are negative.      Past Medical History:   Diagnosis Date   • Arthritis    • Cataract, bilateral    • CHF (congestive heart failure)    • Coronary artery disease    • Depression    • Diabetes mellitus    • Disease of thyroid gland    • Full dentures    • High cholesterol    • Hypertension    • Migraine    • Myocardial infarction    • Pneumonia    • PONV (postoperative nausea and vomiting)    • Seizure     DAUGHTER STATED POSSIBLE SEIZURES.  STATES POSSIBLE MIGRANES/SEIZURES   • Wears glasses        Allergies   Allergen Reactions   • Ceftin [Cefuroxime Axetil] Confusion   • Contrast Dye Rash       Past Surgical History:   Procedure Laterality Date   • CARDIAC CATHETERIZATION     • CARDIAC SURGERY      VALVE REPLACEMENT   • CATARACT EXTRACTION W/ INTRAOCULAR LENS IMPLANT Left 9/12/2017    Procedure: CATARACT PHACO EXTRACTION WITH INTRAOCULAR  LENS IMPLANT LEFT;  Surgeon: Judy Clifton MD;  Location: [facility];  Service:    • CORONARY ARTERY BYPASS GRAFT  2006    X4 VESSEL   • HYSTERECTOMY         Family History   Problem Relation Age of Onset   • Breast cancer Sister        Social History     Social History   • Marital status:      Spouse name: N/A   • Number of children: N/A   • Years of education: N/A     Social History Main Topics   • Smoking status: Never Smoker   • Smokeless tobacco: Never Used   • Alcohol use No   • Drug use: No   • Sexual activity: Defer     Other Topics Concern   • None     Social History Narrative           Objective   Physical Exam   Constitutional: She is oriented to person, place, and time. She appears well-developed and well-nourished.   HENT:   Head: Atraumatic.   Eyes: EOM are normal.   Neck: Normal range of motion. Neck supple.   Cardiovascular: Normal rate and regular rhythm.    Pulmonary/Chest: Effort normal and breath sounds normal.   Abdominal: Soft. Bowel sounds are normal.   Musculoskeletal: She exhibits tenderness.   ttp right shoulder, decrease rom, unable to extend or rotate nv intact   Neurological: She is alert and oriented to person, place, and time. She has normal reflexes.   Skin: Skin is warm and dry.   Right knee abrasion   Psychiatric: She has a normal mood and affect. Her behavior is normal. Judgment and thought content normal.   Nursing note and vitals reviewed.      Procedures         ED Course  ED Course   Comment By Time   Discussed with Dr. Foster he recommended a right shoulder sling and follow-up in the clinic tomorrow Ramu Dueñas Jr., PA-C 02/07 2005                  [facility]    Final diagnoses:   Humeral head fracture, right, closed, initial encounter            Ramu Dueñas Jr., PA-C  02/07/18 2022

## 2018-02-08 NOTE — PROGRESS NOTES
Subjective   Patient ID: Nette Montalvo is a 72 y.o. right hand dominant female is here today for a treatment planning discussion. Pain of the Right Shoulder         History of Present Illness    Patient presents as a new patient with complaints of right shoulder injury.  She was leaving her family member's home when she fell walking down some steps hitting her right shoulder on concrete.  She went to the emergency room immediately following the fall and was told she had a shoulder fracture and placed in a shoulder sling.  Patient denies numbness or tingling.    Pain Score: 8  Pain Location: Shoulder  Pain Orientation: Right     Pain Descriptors: Patient unable to describe (just hurts)  Pain Frequency: Constant/continuous     Date Pain First Started: 02/07/18     Aggravating Factors:  (use of arm)        Pain Intervention(s): Rest, Cold applied, Medication (See MAR) (hydrocodone)  Result of Injury: Yes (fell coming down steps )       Past Medical History:   Diagnosis Date   • Arthritis    • Cataract, bilateral    • Chest pain    • CHF (congestive heart failure)    • Coronary artery disease    • Depression    • Diabetes mellitus    • Disease of thyroid gland    • Fracture    • Full dentures    • Heart disease    • High cholesterol    • Hypertension    • Migraine    • Myocardial infarction    • Pneumonia    • PONV (postoperative nausea and vomiting)    • Seizure     DAUGHTER STATED POSSIBLE SEIZURES.  STATES POSSIBLE MIGRANES/SEIZURES   • Wears glasses         Past Surgical History:   Procedure Laterality Date   • CARDIAC CATHETERIZATION     • CARDIAC SURGERY      VALVE REPLACEMENT   • CATARACT EXTRACTION W/ INTRAOCULAR LENS IMPLANT Left 9/12/2017    Procedure: CATARACT PHACO EXTRACTION WITH INTRAOCULAR LENS IMPLANT LEFT;  Surgeon: Judy Clifton MD;  Location: [facility];  Service:    • CORONARY ARTERY BYPASS GRAFT  2006    X4 VESSEL   • HYSTERECTOMY         Family History   Problem Relation Age of  "Onset   • Breast cancer Sister    • Heart disease Other    • Diabetes Other    • Cancer Other        Social History     Social History   • Marital status:      Spouse name: N/A   • Number of children: N/A   • Years of education: N/A     Occupational History   • Not on file.     Social History Main Topics   • Smoking status: Never Smoker   • Smokeless tobacco: Never Used   • Alcohol use No   • Drug use: No   • Sexual activity: Defer     Other Topics Concern   • Not on file     Social History Narrative       Allergies   Allergen Reactions   • Ceftin [Cefuroxime Axetil] Confusion   • Contrast Dye Rash       Review of Systems   Constitutional: Negative for fever.   HENT: Negative for voice change.    Eyes: Negative for visual disturbance.   Respiratory: Negative for shortness of breath.    Cardiovascular: Negative for chest pain.   Gastrointestinal: Negative for abdominal distention and abdominal pain.   Genitourinary: Negative for dysuria.   Musculoskeletal: Positive for arthralgias. Negative for gait problem and joint swelling.   Skin: Negative for rash.   Neurological: Negative for speech difficulty.   Hematological: Does not bruise/bleed easily.   Psychiatric/Behavioral: Negative for confusion.       Objective   Resp 18  Ht 157.5 cm (62\")  Wt 67.6 kg (149 lb)  BMI 27.25 kg/m2   Physical Exam   Constitutional: She appears well-nourished.   Eyes: Conjunctivae are normal.   Neck: No tracheal deviation present.   Pulmonary/Chest: Effort normal.   Musculoskeletal:        Right shoulder: She exhibits decreased range of motion (due to pain), tenderness, bony tenderness, swelling and pain. She exhibits no crepitus, no deformity and no laceration.        Right elbow: She exhibits normal range of motion, no swelling and no effusion.        Cervical back: She exhibits normal range of motion, no tenderness, no bony tenderness, no swelling, no edema, no deformity, no laceration, no pain, no spasm and normal pulse.      "   Right hand: She exhibits normal capillary refill and no deformity. Normal sensation noted. Normal strength noted. She exhibits no thumb/finger opposition.   Neurological: She is alert.   Psychiatric: She has a normal mood and affect.   Vitals reviewed.    Ortho Exam   Extremity DVT signs are Negative by clinical screen.   Neurologic Exam   Ortho Exam         Assessment/Plan   Independent Review of Radiographic Studies:    No new imaging today.  I did review x-ray imaging from [facility].  There is an acute  Fracture of the humeral head and neck.  Nondisplaced.  Dr. Foster reviewed this while on call yesterday evening.    Procedures       Nette was seen today for pain.    Diagnoses and all orders for this visit:    Fall, initial encounter    Shoulder injury, right, initial encounter    Closed fracture of proximal end of right humerus, unspecified fracture morphology, initial encounter     Regular exercise as tolerated  Orthopedic activities reviewed and patient expressed appreciation  Discussion of orthopedic goals  Risk, benefits, and merits of treatment alternatives reviewed with the patient and questions answered  Use brace as instructed  Reduced physical activity as appropriate  Weight bearing parameters reviewed  Avoid offending activity  Ice, heat, and/or modalities as beneficial    Recommendations/Plan:  Patient is encouraged to call or return for any issues or concerns.  Must wear shoulder sling at all times.       Patient agreeable to call or return sooner for any concerns.

## 2018-03-01 DIAGNOSIS — S42.201A CLOSED FRACTURE OF PROXIMAL END OF RIGHT HUMERUS, UNSPECIFIED FRACTURE MORPHOLOGY, INITIAL ENCOUNTER: Primary | ICD-10-CM

## 2018-03-02 ENCOUNTER — OFFICE VISIT (OUTPATIENT)
Dept: ORTHOPEDIC SURGERY | Facility: CLINIC | Age: 73
End: 2018-03-02

## 2018-03-02 VITALS — BODY MASS INDEX: 27.42 KG/M2 | HEIGHT: 62 IN | RESPIRATION RATE: 18 BRPM | WEIGHT: 149 LBS

## 2018-03-02 DIAGNOSIS — S42.201A CLOSED FRACTURE OF PROXIMAL END OF RIGHT HUMERUS, UNSPECIFIED FRACTURE MORPHOLOGY, INITIAL ENCOUNTER: Primary | ICD-10-CM

## 2018-03-02 PROCEDURE — 99213 OFFICE O/P EST LOW 20 MIN: CPT | Performed by: PHYSICIAN ASSISTANT

## 2018-03-02 RX ORDER — HYDROCODONE BITARTRATE AND ACETAMINOPHEN 5; 325 MG/1; MG/1
1 TABLET ORAL EVERY 12 HOURS PRN
Qty: 14 TABLET | Refills: 0
Start: 2018-03-02 | End: 2018-08-13

## 2018-03-02 RX ORDER — HYDROCODONE BITARTRATE AND ACETAMINOPHEN 5; 325 MG/1; MG/1
TABLET ORAL
Refills: 0 | COMMUNITY
Start: 2018-02-12 | End: 2018-08-13 | Stop reason: ALTCHOICE

## 2018-03-02 NOTE — PROGRESS NOTES
Subjective   Patient ID: Nette Montalvo is a 72 y.o. right hand dominant female is here today for follow-up for right shoulder injury Fracture and Follow-up of the Right Shoulder         History of Present Illness  Patient is following up for routine follow-up visit in regards to right shoulder injury.  She initially fell on walking down some steps on 2/7/2018.  She has been immobilized in a shoulder sling.  She is happy reports she is feeling some better but still has pain.  She does request a refill on her pain medicine.  She denies numbness or tingling to the upper extremity.  Denies neck pain.  Pain Score: 5  Pain Location: Shoulder  Pain Orientation: Right     Pain Descriptors: Aching  Pain Frequency: Intermittent                               Past Medical History:   Diagnosis Date   • Arthritis    • Cataract, bilateral    • Chest pain    • CHF (congestive heart failure)    • Coronary artery disease    • Depression    • Diabetes mellitus    • Disease of thyroid gland    • Fracture    • Full dentures    • Heart disease    • High cholesterol    • Hypertension    • Migraine    • Myocardial infarction    • Pneumonia    • PONV (postoperative nausea and vomiting)    • Seizure     DAUGHTER STATED POSSIBLE SEIZURES.  STATES POSSIBLE MIGRANES/SEIZURES   • Wears glasses         Past Surgical History:   Procedure Laterality Date   • CARDIAC CATHETERIZATION     • CARDIAC SURGERY      VALVE REPLACEMENT   • CATARACT EXTRACTION W/ INTRAOCULAR LENS IMPLANT Left 9/12/2017    Procedure: CATARACT PHACO EXTRACTION WITH INTRAOCULAR LENS IMPLANT LEFT;  Surgeon: Judy Clifton MD;  Location: [facility];  Service:    • CORONARY ARTERY BYPASS GRAFT  2006    X4 VESSEL   • HYSTERECTOMY         Family History   Problem Relation Age of Onset   • Breast cancer Sister    • Heart disease Other    • Diabetes Other    • Cancer Other        Social History     Social History   • Marital status:      Spouse name: N/A   •  "Number of children: N/A   • Years of education: N/A     Occupational History   • Not on file.     Social History Main Topics   • Smoking status: Never Smoker   • Smokeless tobacco: Never Used   • Alcohol use No   • Drug use: No   • Sexual activity: Defer     Other Topics Concern   • Not on file     Social History Narrative       Allergies   Allergen Reactions   • Ceftin [Cefuroxime Axetil] Confusion   • Contrast Dye Rash       Review of Systems   Constitutional: Negative for fever.   HENT: Negative for voice change.    Eyes: Negative for visual disturbance.   Respiratory: Negative for shortness of breath.    Cardiovascular: Negative for chest pain.   Gastrointestinal: Negative for abdominal distention and abdominal pain.   Genitourinary: Negative for dysuria.   Musculoskeletal: Positive for arthralgias. Negative for gait problem and joint swelling.   Skin: Negative for rash.   Neurological: Negative for speech difficulty.   Hematological: Does not bruise/bleed easily.   Psychiatric/Behavioral: Negative for confusion.   All other systems reviewed and are negative.      Objective   Resp 18  Ht 157.5 cm (62\")  Wt 67.6 kg (149 lb)  BMI 27.25 kg/m2   Physical Exam   Constitutional: She is oriented to person, place, and time. She appears well-nourished.   Neck: No tracheal deviation present.   Pulmonary/Chest: Effort normal.   Musculoskeletal:        Right shoulder: She exhibits decreased range of motion (due to being in the sling), tenderness, swelling and pain.        Right hand: She exhibits normal capillary refill and no swelling. Normal sensation noted. Normal strength noted.   Neurological: She is alert and oriented to person, place, and time.   Psychiatric: She has a normal mood and affect. Her behavior is normal.   Vitals reviewed.    Ortho Exam   Extremity DVT signs are Negative by clinical screen.   Neurologic Exam     Mental Status   Oriented to person, place, and time.      Ortho Exam "         Assessment/Plan   Independent Review of Radiographic Studies:    Indication to evaluate fracture healing, and compared with prior imaging, shows interm fracture healing, callus formation and or periostitis in continued good position and alignment.      Procedures       Nette was seen today for fracture and follow-up.    Diagnoses and all orders for this visit:    Closed fracture of proximal end of right humerus, unspecified fracture morphology, initial encounter     Orthopedic activities reviewed and patient expressed appreciation  Discussion of orthopedic goals  Risk, benefits, and merits of treatment alternatives reviewed with the patient and questions answered  Reduced physical activity as appropriate  Weight bearing parameters reviewed  Avoid offending activity    Recommendations/Plan:  Patient is encouraged to call or return for any issues or concerns.    COntinue wearing the sling as directed.     Patient agreeable to call or return sooner for any concerns.

## 2018-03-23 DIAGNOSIS — S42.201A CLOSED FRACTURE OF PROXIMAL END OF RIGHT HUMERUS, UNSPECIFIED FRACTURE MORPHOLOGY, INITIAL ENCOUNTER: Primary | ICD-10-CM

## 2018-03-26 ENCOUNTER — OFFICE VISIT (OUTPATIENT)
Dept: ORTHOPEDIC SURGERY | Facility: CLINIC | Age: 73
End: 2018-03-26

## 2018-03-26 VITALS — WEIGHT: 149.03 LBS | RESPIRATION RATE: 16 BRPM | BODY MASS INDEX: 27.43 KG/M2 | HEIGHT: 62 IN

## 2018-03-26 DIAGNOSIS — S42.201D CLOSED FRACTURE OF PROXIMAL END OF RIGHT HUMERUS WITH ROUTINE HEALING, UNSPECIFIED FRACTURE MORPHOLOGY, SUBSEQUENT ENCOUNTER: Primary | ICD-10-CM

## 2018-03-26 PROCEDURE — 99213 OFFICE O/P EST LOW 20 MIN: CPT | Performed by: PHYSICIAN ASSISTANT

## 2018-03-26 NOTE — PROGRESS NOTES
Subjective   Patient ID: Nette Montalvo is a 72 y.o.  female  Follow-up of the Right Shoulder (Closed fracture of proximal end of right humerus, unspecified fracture morphology. Right hand dominant.)         History of Present Illness  Patient is following up for routine follow-up visit in regards to right shoulder injury.  She initially fell on walking down some steps on 2/7/2018.  She has been immobilized in a shoulder sling    Patient states she is feeling better.  She denies numbness or tingling to the upper extremity.    Pain Score: 5  Pain Location: Shoulder  Pain Orientation: Right     Pain Descriptors: Aching  Pain Frequency: Intermittent                               Past Medical History:   Diagnosis Date   • Arthritis    • Cataract, bilateral    • Chest pain    • CHF (congestive heart failure)    • Coronary artery disease    • Depression    • Diabetes mellitus    • Disease of thyroid gland    • Fracture    • Full dentures    • Heart disease    • High cholesterol    • Hypertension    • Migraine    • Myocardial infarction    • Pneumonia    • PONV (postoperative nausea and vomiting)    • Seizure     DAUGHTER STATED POSSIBLE SEIZURES.  STATES POSSIBLE MIGRANES/SEIZURES   • Wears glasses         Past Surgical History:   Procedure Laterality Date   • CARDIAC CATHETERIZATION     • CARDIAC SURGERY      VALVE REPLACEMENT   • CATARACT EXTRACTION W/ INTRAOCULAR LENS IMPLANT Left 9/12/2017    Procedure: CATARACT PHACO EXTRACTION WITH INTRAOCULAR LENS IMPLANT LEFT;  Surgeon: Judy Clifton MD;  Location: [facility];  Service:    • CORONARY ARTERY BYPASS GRAFT  2006    X4 VESSEL   • HYSTERECTOMY         Family History   Problem Relation Age of Onset   • Breast cancer Sister    • Heart disease Other    • Diabetes Other    • Cancer Other        Social History     Social History   • Marital status:      Spouse name: N/A   • Number of children: N/A   • Years of education: N/A     Occupational History  "  • Not on file.     Social History Main Topics   • Smoking status: Never Smoker   • Smokeless tobacco: Never Used   • Alcohol use No   • Drug use: No   • Sexual activity: Defer     Other Topics Concern   • Not on file     Social History Narrative   • No narrative on file       Allergies   Allergen Reactions   • Ceftin [Cefuroxime Axetil] Confusion   • Contrast Dye Rash       Review of Systems   Constitutional: Negative for fever.   HENT: Negative for voice change.    Eyes: Negative for visual disturbance.   Respiratory: Negative for shortness of breath.    Cardiovascular: Negative for chest pain.   Gastrointestinal: Negative for abdominal distention and abdominal pain.   Genitourinary: Negative for dysuria.   Musculoskeletal: Positive for arthralgias. Negative for gait problem and joint swelling.   Skin: Negative for rash.   Neurological: Negative for speech difficulty.   Hematological: Does not bruise/bleed easily.   Psychiatric/Behavioral: Negative for confusion.   All other systems reviewed and are negative.      Objective   Resp 16   Ht 157.5 cm (62.01\")   Wt 67.6 kg (149 lb 0.5 oz)   BMI 27.25 kg/m²    Physical Exam   Constitutional: She is oriented to person, place, and time. She appears well-nourished.   Pulmonary/Chest: Effort normal.   Musculoskeletal:        Right shoulder: She exhibits decreased range of motion (due to recent immobilization) and tenderness. She exhibits no deformity.        Right hand: She exhibits normal capillary refill. Normal sensation noted. Normal strength noted. She exhibits no thumb/finger opposition.   Neurological: She is alert and oriented to person, place, and time.   Skin: Capillary refill takes less than 2 seconds.   Psychiatric: She has a normal mood and affect.   Vitals reviewed.    Ortho Exam   Extremity DVT signs are Negative by clinical screen.   Neurologic Exam     Mental Status   Oriented to person, place, and time.      Ortho Exam         Assessment/Plan "   Independent Review of Radiographic Studies:    Indication to evaluate fracture healing, and compared with prior imaging, shows interm fracture healing, callus formation and or periostitis in continued good position and alignment.      Procedures       Nette was seen today for follow-up.    Diagnoses and all orders for this visit:    Closed fracture of proximal end of right humerus with routine healing, unspecified fracture morphology, subsequent encounter  -     Ambulatory Referral to Physical Therapy Evaluate and treat, Ortho       Orthopedic activities reviewed and patient expressed appreciation  Discussion of orthopedic goals  Risk, benefits, and merits of treatment alternatives reviewed with the patient and questions answered  Reduced physical activity as appropriate  Weight bearing parameters reviewed  Avoid offending activity  Ice, heat, and/or modalities as beneficial    Recommendations/Plan:  Patient is encouraged to call or return for any issues or concerns.    Fu in 4 weeks XOA  No heavy lifting pushing or pulling.   Patient agreeable to call or return sooner for any concerns.

## 2018-05-04 DIAGNOSIS — S42.201D CLOSED FRACTURE OF PROXIMAL END OF RIGHT HUMERUS WITH ROUTINE HEALING, UNSPECIFIED FRACTURE MORPHOLOGY, SUBSEQUENT ENCOUNTER: Primary | ICD-10-CM

## 2018-05-07 ENCOUNTER — OFFICE VISIT (OUTPATIENT)
Dept: ORTHOPEDIC SURGERY | Facility: CLINIC | Age: 73
End: 2018-05-07

## 2018-05-07 VITALS — WEIGHT: 149.03 LBS | BODY MASS INDEX: 27.43 KG/M2 | HEIGHT: 62 IN | RESPIRATION RATE: 16 BRPM

## 2018-05-07 DIAGNOSIS — R52 PAIN: Primary | ICD-10-CM

## 2018-05-07 DIAGNOSIS — M77.8 TENDINITIS OF RIGHT WRIST: ICD-10-CM

## 2018-05-07 DIAGNOSIS — S42.201D CLOSED FRACTURE OF PROXIMAL END OF RIGHT HUMERUS WITH ROUTINE HEALING, UNSPECIFIED FRACTURE MORPHOLOGY, SUBSEQUENT ENCOUNTER: ICD-10-CM

## 2018-05-07 PROCEDURE — 99213 OFFICE O/P EST LOW 20 MIN: CPT | Performed by: PHYSICIAN ASSISTANT

## 2018-05-07 NOTE — PROGRESS NOTES
Subjective   Patient ID: Nette Montalvo is a 72 y.o.  female  Follow-up of the Right Shoulder (Closed fracture of proximal end of right humerus with routine healing, unspecified fracture morphology. Right hand dominant.)         History of Present Illness  Patient is following up for routine follow-up visit in regards to right shoulder injury.  She initially fell on walking down some steps on 2/7/2018-patient was immobilized in a shoulder sling for 5 weeks    Patient states she is doing better.  She states while in physical therapy she still has some pain to the front portion of her shoulder.  She denies numbness or tingling.    Patient would like to have her right hand evaluated she states she had no pain with her fall she developed the right hand discomfort that is positional several weeks after being in a shoulder sling.  She denies numbness or tingling to the hand.  Pain Score: 3  Pain Location: Shoulder  Pain Orientation: Right     Pain Descriptors: Aching  Pain Frequency: Intermittent           Aggravating Factors: Stretching           Result of Injury: Yes  Work-Related Injury: No    Past Medical History:   Diagnosis Date   • Arthritis    • Cataract, bilateral    • Chest pain    • CHF (congestive heart failure)    • Coronary artery disease    • Depression    • Diabetes mellitus    • Disease of thyroid gland    • Fracture    • Full dentures    • Heart disease    • High cholesterol    • Hypertension    • Migraine    • Myocardial infarction    • Pneumonia    • PONV (postoperative nausea and vomiting)    • Seizure     DAUGHTER STATED POSSIBLE SEIZURES.  STATES POSSIBLE MIGRANES/SEIZURES   • Wears glasses         Past Surgical History:   Procedure Laterality Date   • CARDIAC CATHETERIZATION     • CARDIAC SURGERY      VALVE REPLACEMENT   • CATARACT EXTRACTION W/ INTRAOCULAR LENS IMPLANT Left 9/12/2017    Procedure: CATARACT PHACO EXTRACTION WITH INTRAOCULAR LENS IMPLANT LEFT;  Surgeon: Judy Clifton,  MD;  Location: [facility] OR;  Service:    • CORONARY ARTERY BYPASS GRAFT  2006    X4 VESSEL   • HYSTERECTOMY         Family History   Problem Relation Age of Onset   • Breast cancer Sister    • Heart disease Other    • Diabetes Other    • Cancer Other        Social History     Social History   • Marital status:      Spouse name: N/A   • Number of children: N/A   • Years of education: N/A     Occupational History   • Not on file.     Social History Main Topics   • Smoking status: Never Smoker   • Smokeless tobacco: Never Used   • Alcohol use No   • Drug use: No   • Sexual activity: Defer     Other Topics Concern   • Not on file     Social History Narrative   • No narrative on file         Current Outpatient Prescriptions:   •  aspirin 81 MG EC tablet, Take 81 mg by mouth Daily., Disp: , Rfl:   •  atorvastatin (LIPITOR) 40 MG tablet, Take 40 mg by mouth Daily., Disp: , Rfl:   •  Biotin 42908 MCG tablet, Take 1 tablet by mouth Daily., Disp: , Rfl:   •  busPIRone (BUSPAR) 5 MG tablet, , Disp: , Rfl:   •  cetirizine (zyrTEC) 10 MG tablet, Take 10 mg by mouth Daily As Needed., Disp: , Rfl:   •  levothyroxine (SYNTHROID, LEVOTHROID) 50 MCG tablet, Take 50 mcg by mouth Daily., Disp: , Rfl:   •  lisinopril (PRINIVIL,ZESTRIL) 10 MG tablet, Take 10 mg by mouth Daily., Disp: , Rfl:   •  PARoxetine (PAXIL) 20 MG tablet, Take 20 mg by mouth Every Morning., Disp: , Rfl:   •  sitaGLIPtin-metFORMIN (JANUMET)  MG per tablet, Take 1 tablet by mouth 2 (Two) Times a Day With Meals., Disp: , Rfl:   •  topiramate (TOPAMAX) 50 MG tablet, Take 1 tablet by mouth Every 12 (Twelve) Hours., Disp: 60 tablet, Rfl: 1  •  vitamin B-12 (CYANOCOBALAMIN) 1000 MCG tablet, Take 1,000 mcg by mouth Daily., Disp: , Rfl:   •  vitamin D (ERGOCALCIFEROL) 17960 units capsule capsule, Take 50,000 Units by mouth 1 (One) Time Per Week., Disp: , Rfl:   •  diclofenac (VOLTAREN) 1 % gel gel, Apply 4 g topically 4 (Four) Times a Day As Needed (for pain).,  "Disp: 1 tube, Rfl: 1  •  HYDROcodone-acetaminophen (NORCO)  MG per tablet, Take 1 tablet by mouth Every 4 (Four) Hours As Needed for Moderate Pain ., Disp: 12 tablet, Rfl: 0  •  HYDROcodone-acetaminophen (NORCO) 5-325 MG per tablet, TAKE 1 TABLET EVERY 8 HOURS AS NEEDED FOR PAIN, Disp: , Rfl: 0  •  HYDROcodone-acetaminophen (NORCO) 5-325 MG per tablet, Take 1 tablet by mouth Every 12 (Twelve) Hours As Needed (for pain)., Disp: 14 tablet, Rfl: 0  •  sulfamethoxazole-trimethoprim (BACTRIM DS,SEPTRA DS) 800-160 MG per tablet, , Disp: , Rfl:     Allergies   Allergen Reactions   • Ceftin [Cefuroxime Axetil] Confusion   • Contrast Dye Rash       Review of Systems   Constitutional: Negative for fever.   HENT: Negative for voice change.    Eyes: Negative for visual disturbance.   Respiratory: Negative for shortness of breath.    Cardiovascular: Negative for chest pain.   Gastrointestinal: Negative for abdominal distention and abdominal pain.   Genitourinary: Negative for dysuria.   Musculoskeletal: Positive for arthralgias. Negative for gait problem and joint swelling.   Skin: Negative for rash.   Neurological: Negative for speech difficulty.   Hematological: Does not bruise/bleed easily.   Psychiatric/Behavioral: Negative for confusion.   All other systems reviewed and are negative.      Objective   Resp 16   Ht 157.5 cm (62.01\")   Wt 67.6 kg (149 lb 0.5 oz)   BMI 27.25 kg/m²    Physical Exam   Constitutional: She is oriented to person, place, and time. She appears well-nourished.   Pulmonary/Chest: Effort normal.   Musculoskeletal:        Right shoulder: She exhibits tenderness (biceps tendon).        Right wrist: She exhibits tenderness (forearm flexor- extensor tendons). She exhibits no swelling, no effusion, no crepitus and no deformity.        Right hand: She exhibits no tenderness, normal two-point discrimination, normal capillary refill, no deformity and no swelling. Normal sensation noted. Normal strength " noted. She exhibits no thumb/finger opposition.   Neurological: She is alert and oriented to person, place, and time.   Skin: Capillary refill takes less than 2 seconds.   Psychiatric: She has a normal mood and affect.   Vitals reviewed.    Right Shoulder Exam     Range of Motion   Active Abduction: 120   Forward Flexion: 120   Internal Rotation 0 degrees: Sacrum     Muscle Strength   The patient has normal right shoulder strength.  Biceps: 4/5     Tests   Apprehension: negative  Cross Arm: negative  Drop Arm: negative    Other   Erythema: absent  Sensation: normal              Neurologic Exam     Mental Status   Oriented to person, place, and time.      Right Shoulder Exam     Muscle Strength   Normal right shoulder strength         + Yeargison test       + thumb grind test  Neg. Snuffbox ttp    Assessment/Plan   Independent Review of Radiographic Studies:    Indication to evaluate fracture healing, and compared with prior imaging, shows interm fracture healing, callus formation and or periostitis in continued good position and alignment.      Procedures       Nette was seen today for follow-up.    Diagnoses and all orders for this visit:    Pain  -     XR Hand 3+ View Right    Closed fracture of proximal end of right humerus with routine healing, unspecified fracture morphology, subsequent encounter    Tendinitis of right wrist    Other orders  -     diclofenac (VOLTAREN) 1 % gel gel; Apply 4 g topically 4 (Four) Times a Day As Needed (for pain).       Orthopedic activities reviewed and patient expressed appreciation  Discussion of orthopedic goals  Risk, benefits, and merits of treatment alternatives reviewed with the patient and questions answered    Recommendations/Plan:  Exercise, medications, injections, other patient advice, and return appointment as noted.  Patient is encouraged to call or return for any issues or concerns.  Continue Pt for shoulder and add in order for right wrist tendinitis.   Use warm  compresses as needed.    Patient agreeable to call or return sooner for any concerns.

## 2018-05-11 RX ORDER — DEXAMETHASONE SODIUM PHOSPHATE 4 MG/ML
10 INJECTION, SOLUTION INTRA-ARTICULAR; INTRALESIONAL; INTRAMUSCULAR; INTRAVENOUS; SOFT TISSUE TAKE AS DIRECTED
Qty: 30 ML | Refills: 0 | Status: SHIPPED | OUTPATIENT
Start: 2018-05-11 | End: 2018-08-13

## 2018-06-18 DIAGNOSIS — S42.201D CLOSED FRACTURE OF PROXIMAL END OF RIGHT HUMERUS WITH ROUTINE HEALING, UNSPECIFIED FRACTURE MORPHOLOGY, SUBSEQUENT ENCOUNTER: Primary | ICD-10-CM

## 2018-06-20 ENCOUNTER — OFFICE VISIT (OUTPATIENT)
Dept: ORTHOPEDIC SURGERY | Facility: CLINIC | Age: 73
End: 2018-06-20

## 2018-06-20 VITALS — RESPIRATION RATE: 18 BRPM | BODY MASS INDEX: 25.95 KG/M2 | WEIGHT: 141 LBS | HEIGHT: 62 IN

## 2018-06-20 DIAGNOSIS — R52 PAIN: Primary | ICD-10-CM

## 2018-06-20 DIAGNOSIS — S42.201D CLOSED FRACTURE OF PROXIMAL END OF RIGHT HUMERUS WITH ROUTINE HEALING, UNSPECIFIED FRACTURE MORPHOLOGY, SUBSEQUENT ENCOUNTER: ICD-10-CM

## 2018-06-20 DIAGNOSIS — M75.81 ROTATOR CUFF TENDINITIS, RIGHT: ICD-10-CM

## 2018-06-20 PROCEDURE — 99213 OFFICE O/P EST LOW 20 MIN: CPT | Performed by: PHYSICIAN ASSISTANT

## 2018-06-20 PROCEDURE — 20610 DRAIN/INJ JOINT/BURSA W/O US: CPT | Performed by: PHYSICIAN ASSISTANT

## 2018-06-20 RX ORDER — NITROFURANTOIN 25; 75 MG/1; MG/1
CAPSULE ORAL
COMMUNITY
Start: 2018-05-11 | End: 2018-08-13

## 2018-06-20 RX ORDER — METHYLPREDNISOLONE ACETATE 40 MG/ML
40 INJECTION, SUSPENSION INTRA-ARTICULAR; INTRALESIONAL; INTRAMUSCULAR; SOFT TISSUE
Status: COMPLETED | OUTPATIENT
Start: 2018-06-20 | End: 2018-06-20

## 2018-06-20 RX ORDER — CALCIUM CITRATE/VITAMIN D3 200MG-6.25
TABLET ORAL
Refills: 5 | COMMUNITY
Start: 2018-05-22

## 2018-06-20 RX ORDER — LIDOCAINE HYDROCHLORIDE 10 MG/ML
2 INJECTION, SOLUTION INFILTRATION; PERINEURAL
Status: COMPLETED | OUTPATIENT
Start: 2018-06-20 | End: 2018-06-20

## 2018-06-20 RX ADMIN — METHYLPREDNISOLONE ACETATE 40 MG: 40 INJECTION, SUSPENSION INTRA-ARTICULAR; INTRALESIONAL; INTRAMUSCULAR; SOFT TISSUE at 13:41

## 2018-06-20 RX ADMIN — LIDOCAINE HYDROCHLORIDE 2 ML: 10 INJECTION, SOLUTION INFILTRATION; PERINEURAL at 13:41

## 2018-06-20 NOTE — PROGRESS NOTES
Subjective   Patient ID: Nette Montalvo is a 73 y.o. right hand dominant female  Follow-up of the Right Shoulder         History of Present Illness  Patient is following up for routine follow-up visit in regards to right shoulder injury.  She initially fell on walking down some steps on 2/7/2018-patient was immobilized in a shoulder sling for 5 weeks    Patient states she did complete physical therapy and is feeling better.  She told the nurse initially she had no pain but she states with certain movements such as outstretching her arm or lifting above her head she will have pain.  She denies numbness or tingling.    Pain Score: no pain                                              Past Medical History:   Diagnosis Date   • Arthritis    • Cataract, bilateral    • Chest pain    • CHF (congestive heart failure)    • Coronary artery disease    • Depression    • Diabetes mellitus    • Disease of thyroid gland    • Fracture    • Full dentures    • Heart disease    • High cholesterol    • Hypertension    • Migraine    • Myocardial infarction    • Pneumonia    • PONV (postoperative nausea and vomiting)    • Seizure     DAUGHTER STATED POSSIBLE SEIZURES.  STATES POSSIBLE MIGRANES/SEIZURES   • Wears glasses         Past Surgical History:   Procedure Laterality Date   • CARDIAC CATHETERIZATION     • CARDIAC SURGERY      VALVE REPLACEMENT   • CATARACT EXTRACTION W/ INTRAOCULAR LENS IMPLANT Left 9/12/2017    Procedure: CATARACT PHACO EXTRACTION WITH INTRAOCULAR LENS IMPLANT LEFT;  Surgeon: Judy Clifton MD;  Location: [facility];  Service:    • CORONARY ARTERY BYPASS GRAFT  2006    X4 VESSEL   • HYSTERECTOMY         Family History   Problem Relation Age of Onset   • Breast cancer Sister    • Heart disease Other    • Diabetes Other    • Cancer Other        Social History     Social History   • Marital status:      Spouse name: N/A   • Number of children: N/A   • Years of education: N/A     Occupational  History   • Not on file.     Social History Main Topics   • Smoking status: Never Smoker   • Smokeless tobacco: Never Used   • Alcohol use No   • Drug use: No   • Sexual activity: Defer     Other Topics Concern   • Not on file     Social History Narrative   • No narrative on file         Current Outpatient Prescriptions:   •  aspirin 81 MG EC tablet, Take 81 mg by mouth Daily., Disp: , Rfl:   •  atorvastatin (LIPITOR) 40 MG tablet, Take 40 mg by mouth Daily., Disp: , Rfl:   •  Biotin 15262 MCG tablet, Take 1 tablet by mouth Daily., Disp: , Rfl:   •  busPIRone (BUSPAR) 5 MG tablet, , Disp: , Rfl:   •  cetirizine (zyrTEC) 10 MG tablet, Take 10 mg by mouth Daily As Needed., Disp: , Rfl:   •  dexamethasone (DECADRON) 4 MG/ML injection, Apply 2.5 mL topically Take As Directed. USE AS DIRECTED WITH PHYSICAL THERAPY (IONTOPHORESIS), Disp: 30 mL, Rfl: 0  •  diclofenac (VOLTAREN) 1 % gel gel, Apply 4 g topically 4 (Four) Times a Day As Needed (for pain)., Disp: 1 tube, Rfl: 1  •  HYDROcodone-acetaminophen (NORCO)  MG per tablet, Take 1 tablet by mouth Every 4 (Four) Hours As Needed for Moderate Pain ., Disp: 12 tablet, Rfl: 0  •  HYDROcodone-acetaminophen (NORCO) 5-325 MG per tablet, TAKE 1 TABLET EVERY 8 HOURS AS NEEDED FOR PAIN, Disp: , Rfl: 0  •  HYDROcodone-acetaminophen (NORCO) 5-325 MG per tablet, Take 1 tablet by mouth Every 12 (Twelve) Hours As Needed (for pain)., Disp: 14 tablet, Rfl: 0  •  levothyroxine (SYNTHROID, LEVOTHROID) 50 MCG tablet, Take 50 mcg by mouth Daily., Disp: , Rfl:   •  lisinopril (PRINIVIL,ZESTRIL) 10 MG tablet, Take 10 mg by mouth Daily., Disp: , Rfl:   •  nitrofurantoin, macrocrystal-monohydrate, (MACROBID) 100 MG capsule, , Disp: , Rfl:   •  PARoxetine (PAXIL) 20 MG tablet, Take 20 mg by mouth Every Morning., Disp: , Rfl:   •  sitaGLIPtin-metFORMIN (JANUMET)  MG per tablet, Take 1 tablet by mouth 2 (Two) Times a Day With Meals., Disp: , Rfl:   •  sulfamethoxazole-trimethoprim  "(BACTRIM DS,SEPTRA DS) 800-160 MG per tablet, , Disp: , Rfl:   •  topiramate (TOPAMAX) 50 MG tablet, Take 1 tablet by mouth Every 12 (Twelve) Hours., Disp: 60 tablet, Rfl: 1  •  TRUE METRIX BLOOD GLUCOSE TEST test strip, TEST BLOOD 3 TIMES A DAY, Disp: , Rfl: 5  •  vitamin B-12 (CYANOCOBALAMIN) 1000 MCG tablet, Take 1,000 mcg by mouth Daily., Disp: , Rfl:   •  vitamin D (ERGOCALCIFEROL) 13370 units capsule capsule, Take 50,000 Units by mouth 1 (One) Time Per Week., Disp: , Rfl:     Allergies   Allergen Reactions   • Ceftin [Cefuroxime Axetil] Confusion   • Contrast Dye Rash       Review of Systems   Constitutional: Negative for fever.   HENT: Negative for voice change.    Eyes: Negative for visual disturbance.   Respiratory: Negative for shortness of breath.    Cardiovascular: Negative for chest pain.   Gastrointestinal: Negative for abdominal distention and abdominal pain.   Genitourinary: Negative for dysuria.   Musculoskeletal: Negative for arthralgias, gait problem and joint swelling.   Skin: Negative for rash.   Neurological: Negative for speech difficulty.   Hematological: Does not bruise/bleed easily.   Psychiatric/Behavioral: Negative for confusion.       Objective   Resp 18   Ht 157.5 cm (62.01\")   Wt 64 kg (141 lb)   BMI 25.78 kg/m²    Physical Exam   Constitutional: She is oriented to person, place, and time. She appears well-nourished.   Neck: No tracheal deviation present.   Pulmonary/Chest: Effort normal.   Musculoskeletal:        Right shoulder: She exhibits tenderness (posterior shoulder). She exhibits no swelling, no crepitus and no deformity.        Right elbow: No tenderness found. No radial head, no medial epicondyle, no lateral epicondyle and no olecranon process tenderness noted.        Right hand: She exhibits normal capillary refill and no swelling. Normal sensation noted. Normal strength noted. She exhibits no thumb/finger opposition.   Neurological: She is alert and oriented to person, " place, and time.   Skin: Capillary refill takes less than 2 seconds.   Psychiatric: She has a normal mood and affect. Her behavior is normal.   Vitals reviewed.    Right Shoulder Exam     Range of Motion   Right shoulder active abduction: 109.   Right shoulder forward flexion: 115.   Internal Rotation 0 degrees: Sacrum     Muscle Strength   The patient has normal right shoulder strength.    Tests   Cross Arm: negative  Drop Arm: negative  Impingement: positive    Other   Erythema: absent  Sensation: normal  Pulse: present             Neurologic Exam     Mental Status   Oriented to person, place, and time.      Right Elbow Exam     Tenderness   The patient is experiencing tenderness in the no lateral epicondyle, no medial epicondyle, no radial head, no olecranon process.    Right Shoulder Exam     Muscle Strength   Normal right shoulder strength               Assessment/Plan   Independent Review of Radiographic Studies:    Indication to evaluate fracture healing, and compared with prior imaging, shows interm fracture healing, callus formation and or periostitis in continued good position and alignment.      Large Joint Arthrocentesis  Date/Time: 6/20/2018 1:41 PM  Consent given by: patient  Site marked: site marked  Timeout: Immediately prior to procedure a time out was called to verify the correct patient, procedure, equipment, support staff and site/side marked as required   Supporting Documentation  Indications: pain   Procedure Details  Location: shoulder - R subacromial bursa  Preparation: Patient was prepped and draped in the usual sterile fashion  Needle size: 22 G  Approach: posterior  Medications administered: 2 mL lidocaine 1 %; 40 mg methylPREDNISolone acetate 40 MG/ML  Patient tolerance: patient tolerated the procedure well with no immediate complications             Nette was seen today for follow-up.    Diagnoses and all orders for this visit:    Pain  -     Large Joint Arthrocentesis    Closed fracture  of proximal end of right humerus with routine healing, unspecified fracture morphology, subsequent encounter    Rotator cuff tendinitis, right       Orthopedic activities reviewed and patient expressed appreciation  Discussion of orthopedic goals  Risk, benefits, and merits of treatment alternatives reviewed with the patient and questions answered  Call or notify for any adverse effect from injection therapy    Recommendations/Plan:  Exercise, medications, injections, other patient advice, and return appointment as noted.  Patient is encouraged to call or return for any issues or concerns.  We discussed the option of ordering an MRI patient would like to refrain from this at this time.  FU in 3 months     Patient agreeable to call or return sooner for any concerns.

## 2018-08-14 ENCOUNTER — HOSPITAL ENCOUNTER (OUTPATIENT)
Facility: HOSPITAL | Age: 73
Setting detail: HOSPITAL OUTPATIENT SURGERY
Discharge: HOME | End: 2018-08-14
Attending: OPHTHALMOLOGY | Admitting: OPHTHALMOLOGY

## 2018-08-14 ENCOUNTER — ANESTHESIA EVENT (OUTPATIENT)
Dept: PERIOP | Facility: HOSPITAL | Age: 73
End: 2018-08-14

## 2018-08-14 ENCOUNTER — ANESTHESIA (OUTPATIENT)
Dept: PERIOP | Facility: HOSPITAL | Age: 73
End: 2018-08-14

## 2018-08-14 VITALS
RESPIRATION RATE: 18 BRPM | HEIGHT: 63 IN | WEIGHT: 140 LBS | DIASTOLIC BLOOD PRESSURE: 57 MMHG | SYSTOLIC BLOOD PRESSURE: 127 MMHG | TEMPERATURE: 97.5 F | HEART RATE: 73 BPM | BODY MASS INDEX: 24.8 KG/M2 | OXYGEN SATURATION: 97 %

## 2018-08-14 PROBLEM — H26.9 CATARACT OF RIGHT EYE: Status: RESOLVED | Noted: 2018-08-14 | Resolved: 2018-08-14

## 2018-08-14 PROBLEM — H26.9 CATARACT OF RIGHT EYE: Status: ACTIVE | Noted: 2018-08-14

## 2018-08-14 LAB — GLUCOSE BLDC GLUCOMTR-MCNC: 107 MG/DL (ref 70–130)

## 2018-08-14 PROCEDURE — V2632 POST CHMBR INTRAOCULAR LENS: HCPCS | Performed by: OPHTHALMOLOGY

## 2018-08-14 PROCEDURE — 82962 GLUCOSE BLOOD TEST: CPT

## 2018-08-14 PROCEDURE — 25010000002 EPINEPHRINE PER 0.1 MG: Performed by: OPHTHALMOLOGY

## 2018-08-14 PROCEDURE — 25010000002 MIDAZOLAM PER 1 MG: Performed by: NURSE ANESTHETIST, CERTIFIED REGISTERED

## 2018-08-14 DEVICE — LENS ACRYSOF IQ 6X13MM SN60WF 17.0: Type: IMPLANTABLE DEVICE | Site: POSTERIOR CHAMBER | Status: FUNCTIONAL

## 2018-08-14 RX ORDER — SODIUM CHLORIDE, SODIUM LACTATE, POTASSIUM CHLORIDE, CALCIUM CHLORIDE 600; 310; 30; 20 MG/100ML; MG/100ML; MG/100ML; MG/100ML
1000 INJECTION, SOLUTION INTRAVENOUS CONTINUOUS
Status: DISCONTINUED | OUTPATIENT
Start: 2018-08-14 | End: 2018-08-14 | Stop reason: HOSPADM

## 2018-08-14 RX ORDER — TETRACAINE HYDROCHLORIDE 5 MG/ML
SOLUTION OPHTHALMIC AS NEEDED
Status: DISCONTINUED | OUTPATIENT
Start: 2018-08-14 | End: 2018-08-14 | Stop reason: HOSPADM

## 2018-08-14 RX ORDER — TETRACAINE HYDROCHLORIDE 5 MG/ML
1 SOLUTION OPHTHALMIC SEE ADMIN INSTRUCTIONS
Status: COMPLETED | OUTPATIENT
Start: 2018-08-14 | End: 2018-08-14

## 2018-08-14 RX ORDER — SODIUM CHLORIDE 0.9 % (FLUSH) 0.9 %
3 SYRINGE (ML) INJECTION AS NEEDED
Status: DISCONTINUED | OUTPATIENT
Start: 2018-08-14 | End: 2018-08-14 | Stop reason: HOSPADM

## 2018-08-14 RX ORDER — BALANCED SALT SOLUTION 6.4; .75; .48; .3; 3.9; 1.7 MG/ML; MG/ML; MG/ML; MG/ML; MG/ML; MG/ML
SOLUTION OPHTHALMIC AS NEEDED
Status: DISCONTINUED | OUTPATIENT
Start: 2018-08-14 | End: 2018-08-14 | Stop reason: HOSPADM

## 2018-08-14 RX ORDER — ACETAMINOPHEN 325 MG/1
650 TABLET ORAL AS NEEDED
Status: DISCONTINUED | OUTPATIENT
Start: 2018-08-14 | End: 2018-08-14 | Stop reason: HOSPADM

## 2018-08-14 RX ORDER — IBUPROFEN 600 MG/1
600 TABLET ORAL EVERY 6 HOURS PRN
Status: CANCELLED | OUTPATIENT
Start: 2018-08-14

## 2018-08-14 RX ORDER — CYCLOPENTOLATE HYDROCHLORIDE 20 MG/ML
1 SOLUTION/ DROPS OPHTHALMIC
Status: COMPLETED | OUTPATIENT
Start: 2018-08-14 | End: 2018-08-14

## 2018-08-14 RX ORDER — LIDOCAINE HYDROCHLORIDE 10 MG/ML
INJECTION, SOLUTION EPIDURAL; INFILTRATION; INTRACAUDAL; PERINEURAL AS NEEDED
Status: DISCONTINUED | OUTPATIENT
Start: 2018-08-14 | End: 2018-08-14 | Stop reason: HOSPADM

## 2018-08-14 RX ORDER — PILOCARPINE HYDROCHLORIDE 10 MG/ML
SOLUTION/ DROPS OPHTHALMIC AS NEEDED
Status: DISCONTINUED | OUTPATIENT
Start: 2018-08-14 | End: 2018-08-14 | Stop reason: HOSPADM

## 2018-08-14 RX ORDER — PREDNISOLONE ACETATE 10 MG/ML
1 SUSPENSION/ DROPS OPHTHALMIC
Status: DISCONTINUED | OUTPATIENT
Start: 2018-08-14 | End: 2018-08-14 | Stop reason: HOSPADM

## 2018-08-14 RX ORDER — ENALAPRILAT 2.5 MG/2ML
1.25 INJECTION INTRAVENOUS ONCE AS NEEDED
Status: DISCONTINUED | OUTPATIENT
Start: 2018-08-14 | End: 2018-08-14 | Stop reason: HOSPADM

## 2018-08-14 RX ORDER — SODIUM CHLORIDE 0.9 % (FLUSH) 0.9 %
1-10 SYRINGE (ML) INJECTION AS NEEDED
Status: DISCONTINUED | OUTPATIENT
Start: 2018-08-14 | End: 2018-08-14 | Stop reason: HOSPADM

## 2018-08-14 RX ORDER — PREDNISOLONE ACETATE 10 MG/ML
SUSPENSION/ DROPS OPHTHALMIC AS NEEDED
Status: DISCONTINUED | OUTPATIENT
Start: 2018-08-14 | End: 2018-08-14 | Stop reason: HOSPADM

## 2018-08-14 RX ORDER — MOXIFLOXACIN 5 MG/ML
1 SOLUTION/ DROPS OPHTHALMIC 3 TIMES DAILY
COMMUNITY
End: 2022-06-08

## 2018-08-14 RX ORDER — MIDAZOLAM HYDROCHLORIDE 1 MG/ML
INJECTION INTRAMUSCULAR; INTRAVENOUS AS NEEDED
Status: DISCONTINUED | OUTPATIENT
Start: 2018-08-14 | End: 2018-08-14 | Stop reason: SURG

## 2018-08-14 RX ORDER — PHENYLEPHRINE HYDROCHLORIDE 100 MG/ML
1 SOLUTION/ DROPS OPHTHALMIC
Status: COMPLETED | OUTPATIENT
Start: 2018-08-14 | End: 2018-08-14

## 2018-08-14 RX ADMIN — MIDAZOLAM HYDROCHLORIDE 0.5 MG: 1 INJECTION, SOLUTION INTRAMUSCULAR; INTRAVENOUS at 10:00

## 2018-08-14 RX ADMIN — CYCLOPENTOLATE HYDROCHLORIDE 1 DROP: 20 SOLUTION/ DROPS OPHTHALMIC at 08:39

## 2018-08-14 RX ADMIN — SODIUM CHLORIDE, POTASSIUM CHLORIDE, SODIUM LACTATE AND CALCIUM CHLORIDE 1000 ML: 600; 310; 30; 20 INJECTION, SOLUTION INTRAVENOUS at 08:50

## 2018-08-14 RX ADMIN — PHENYLEPHRINE HYDROCHLORIDE 1 DROP: 100 SOLUTION/ DROPS OPHTHALMIC at 08:39

## 2018-08-14 RX ADMIN — MIDAZOLAM HYDROCHLORIDE 0.5 MG: 1 INJECTION, SOLUTION INTRAMUSCULAR; INTRAVENOUS at 09:39

## 2018-08-14 RX ADMIN — TETRACAINE HYDROCHLORIDE 1 DROP: 5 SOLUTION OPHTHALMIC at 08:37

## 2018-08-14 RX ADMIN — MIDAZOLAM HYDROCHLORIDE 0.5 MG: 1 INJECTION, SOLUTION INTRAMUSCULAR; INTRAVENOUS at 09:52

## 2018-08-14 RX ADMIN — CYCLOPENTOLATE HYDROCHLORIDE 1 DROP: 20 SOLUTION/ DROPS OPHTHALMIC at 08:49

## 2018-08-14 RX ADMIN — CYCLOPENTOLATE HYDROCHLORIDE 1 DROP: 20 SOLUTION/ DROPS OPHTHALMIC at 08:44

## 2018-08-14 RX ADMIN — PHENYLEPHRINE HYDROCHLORIDE 1 DROP: 100 SOLUTION/ DROPS OPHTHALMIC at 08:49

## 2018-08-14 RX ADMIN — PHENYLEPHRINE HYDROCHLORIDE 1 DROP: 100 SOLUTION/ DROPS OPHTHALMIC at 08:44

## 2018-08-14 RX ADMIN — MIDAZOLAM HYDROCHLORIDE 0.5 MG: 1 INJECTION, SOLUTION INTRAMUSCULAR; INTRAVENOUS at 09:57

## 2018-08-14 RX ADMIN — TETRACAINE HYDROCHLORIDE 1 DROP: 5 SOLUTION OPHTHALMIC at 08:38

## 2018-08-14 NOTE — DISCHARGE INSTRUCTIONS
Please follow all post op instructions and follow up appointment time from your physician's office included in your discharge packet.

## 2018-08-14 NOTE — ANESTHESIA PREPROCEDURE EVALUATION
Anesthesia Evaluation     Patient summary reviewed and Nursing notes reviewed   history of anesthetic complications: PONV  NPO Solid Status: > 8 hours  NPO Liquid Status: > 8 hours           Airway   Mallampati: II  TM distance: >3 FB  Neck ROM: full  no difficulty expected  Dental    (+) upper dentures and lower dentures    Pulmonary - normal exam   (+) pneumonia resolved , decreased breath sounds,   (-) not a smoker  Cardiovascular - normal exam  Exercise tolerance: good (4-7 METS)    PT is on anticoagulation therapy    (+) hypertension well controlled, past MI  >12 months, CAD, CABG, CHF, hyperlipidemia,       Neuro/Psych  (+) seizures (related to migraines, has not had one in a long time), TIA, headaches (migraines), psychiatric history Depression,     GI/Hepatic/Renal/Endo    (+)  GERD,  diabetes mellitus (FSBS 95) well controlled,     Musculoskeletal     (+) arthralgias, back pain, chronic pain,   Abdominal    Substance History - negative use     OB/GYN          Other   (+) arthritis                       Anesthesia Plan    ASA 3     MAC   (Risks and benefits discussed including risk of aspiration, recall and dental damage. All patient questions answered. Will continue with POC.)  intravenous induction   Anesthetic plan and risks discussed with patient.

## 2018-08-14 NOTE — ANESTHESIA POSTPROCEDURE EVALUATION
Patient: Nette Montalvo    Procedure Summary     Date:  08/14/18 Room / Location:  [facility] OR  / [facility] OR    Anesthesia Start:  0946 Anesthesia Stop:      Procedure:  CATARACT PHACO EXTRACTION WITH INTRAOCULAR LENS IMPLANT RIGHT (Right Eye) Diagnosis:       Age-related nuclear cataract of right eye      (Age-related nuclear cataract of right eye [H25.11])    Surgeon:  Judy Clifton MD Provider:  Alejandro Bess CRNA    Anesthesia Type:  MAC ASA Status:  3          Anesthesia Type: MAC  Last vitals  BP   161/66 (08/14/18 0836)   Temp   97.9 °F (36.6 °C) (08/14/18 0836)   Pulse   68 (08/14/18 0836)   Resp   18 (08/14/18 0836)     SpO2   99 % (08/14/18 0836)     Post Anesthesia Care and Evaluation    Patient location during evaluation: PHASE II  Patient participation: complete - patient participated  Level of consciousness: awake  Pain score: 0  Pain management: adequate  Airway patency: patent  Anesthetic complications: No anesthetic complications  PONV Status: none  Cardiovascular status: acceptable  Respiratory status: acceptable  Hydration status: acceptable    Comments: vsss resp spont, reflexes intact, responsive, report given to pacu nurse

## 2018-08-14 NOTE — OP NOTE
OPERATIVE NOTE    DATE OF PROCEDURE: 8/14/2018  PATIENT NAME: Nette Montalvo  PATIENT MRN: [id number]  YOB: 1945     PREOPERATIVE DIAGNOSIS: Right nuclear sclerotic cataract.     POSTOPERATIVE DIAGNOSIS: Right nuclear sclerotic cataract.     PROCEDURE PERFORMED: Phacoemulsification with implantation of a 17.0 diopter foldable posterior chamber intraocular lens, Right eye.     SURGEON: Judy Clifton MD      INDICATIONS: The patient is an 73 y.o. female with a Right nuclear sclerotic cataract with a preoperative visual acuity of 20/400. The patient desires better vision and is brought to the operating room at this time for elective cataract extraction with planned IOL.      DESCRIPTION OF PROCEDURE: The patient was brought to the operating room in the fasting state. Once all the vital signs were established to be within normal limits and supplemental oxygen was given, the area of the operative eye was prepped and draped in the usual fashion. A wire lid speculum was inserted into the cul-de-sac. Additional topical anesthetic drops were instilled. A fornix-based conjunctival flap was performed from the 11-o'clock to 1-o'clock position. A stab incision was made in the peripheral cornea with the 0.8mm stab blade. Next 0.3 mL of 1% unpreserved Xylocaine was injected in the anterior chamber. The anterior chamber was then entered with a 2.4 mm keratome blade and then under viscoelastic a capsulotomy was performed using the disposable cystotome in a continuous curvilinear fashion. The anterior capsule was then removed and the phacoemulsification handpiece was then introduced into the anterior segment and the nucleus was emulsified without difficulty. The CDE was 8.25. Once this was accomplished, the irrigation and aspiration handpiece was then used to aspirate the residual cortex. The posterior capsule was cleaned of any residual material and then polished with the irrigation and aspiration  handpiece and the viscoelastic was used to inflate the capsular bag. The [employer] AcrySof implant was then loaded into the microcartridge. It measured 17.0 diopters. The injector was then introduced into the capsular bag and the implant was slowly implanted without difficulty. The lens was then rotated to the appropriate axis and the viscoelastic was aspirated. The conjunctiva was then repositioned. Topical Betoptic-S, pilocarpine, and Pred-Forte drops were applied. Polysporin ointment was then instilled. There were no anesthetic or surgical difficulties. The patient tolerated the procedure very well and was returned to same day surgery in satisfactory condition.       Implant Name Type Inv. Item Serial No.  Lot No. LRB No. Used   LENS ACRYSOF IQ [id number] 17.0 - [id number] - [id number] Implant LENS ACRYSOF IQ [id number] [id number] 17.0 [id number] [employer]   Right 1           Judy Clifton MD  8/14/2018

## 2018-09-20 DIAGNOSIS — S42.201D CLOSED FRACTURE OF PROXIMAL END OF RIGHT HUMERUS WITH ROUTINE HEALING, UNSPECIFIED FRACTURE MORPHOLOGY, SUBSEQUENT ENCOUNTER: Primary | ICD-10-CM

## 2022-05-18 ENCOUNTER — OFFICE VISIT (OUTPATIENT)
Dept: SURGERY | Facility: CLINIC | Age: 77
End: 2022-05-18

## 2022-05-18 VITALS
HEIGHT: 63 IN | SYSTOLIC BLOOD PRESSURE: 138 MMHG | TEMPERATURE: 97.3 F | HEART RATE: 73 BPM | OXYGEN SATURATION: 98 % | WEIGHT: 150.6 LBS | DIASTOLIC BLOOD PRESSURE: 70 MMHG | BODY MASS INDEX: 26.68 KG/M2 | RESPIRATION RATE: 18 BRPM

## 2022-05-18 DIAGNOSIS — D64.9 ANEMIA, UNSPECIFIED TYPE: Primary | ICD-10-CM

## 2022-05-18 PROCEDURE — 99203 OFFICE O/P NEW LOW 30 MIN: CPT | Performed by: SURGERY

## 2022-06-02 ENCOUNTER — TELEPHONE (OUTPATIENT)
Dept: SURGERY | Facility: CLINIC | Age: 77
End: 2022-06-02

## 2022-06-02 NOTE — TELEPHONE ENCOUNTER
Patient called for you and states she has some questions about colonoscopy.  Please call at [phone].

## 2022-06-07 ENCOUNTER — PREP FOR SURGERY (OUTPATIENT)
Dept: OTHER | Facility: HOSPITAL | Age: 77
End: 2022-06-07

## 2022-06-07 ENCOUNTER — TELEPHONE (OUTPATIENT)
Dept: SURGERY | Facility: CLINIC | Age: 77
End: 2022-06-07

## 2022-06-07 DIAGNOSIS — D64.9 ANEMIA, UNSPECIFIED TYPE: Primary | ICD-10-CM

## 2022-06-07 DIAGNOSIS — Z01.818 PREOP TESTING: Primary | ICD-10-CM

## 2022-06-07 RX ORDER — SODIUM CHLORIDE, SODIUM LACTATE, POTASSIUM CHLORIDE, CALCIUM CHLORIDE 600; 310; 30; 20 MG/100ML; MG/100ML; MG/100ML; MG/100ML
50 INJECTION, SOLUTION INTRAVENOUS CONTINUOUS
Status: CANCELLED | OUTPATIENT
Start: 2022-06-07

## 2022-06-07 RX ORDER — POLYETHYLENE GLYCOL 3350 17 G/17G
POWDER, FOR SOLUTION ORAL
Qty: 238 G | Refills: 0 | Status: SHIPPED | OUTPATIENT
Start: 2022-06-07 | End: 2022-06-10 | Stop reason: HOSPADM

## 2022-06-07 RX ORDER — SODIUM CHLORIDE 0.9 % (FLUSH) 0.9 %
10 SYRINGE (ML) INJECTION EVERY 12 HOURS SCHEDULED
Status: CANCELLED | OUTPATIENT
Start: 2022-06-07

## 2022-06-07 RX ORDER — BISACODYL 5 MG
TABLET, DELAYED RELEASE (ENTERIC COATED) ORAL
Qty: 4 TABLET | Refills: 0 | Status: SHIPPED | OUTPATIENT
Start: 2022-06-07 | End: 2022-06-10 | Stop reason: HOSPADM

## 2022-06-07 RX ORDER — SODIUM CHLORIDE 0.9 % (FLUSH) 0.9 %
10 SYRINGE (ML) INJECTION AS NEEDED
Status: CANCELLED | OUTPATIENT
Start: 2022-06-07

## 2022-06-08 ENCOUNTER — LAB (OUTPATIENT)
Dept: LAB | Facility: HOSPITAL | Age: 77
End: 2022-06-08

## 2022-06-08 DIAGNOSIS — Z01.818 PREOP TESTING: ICD-10-CM

## 2022-06-08 PROBLEM — D64.9 ANEMIA: Status: ACTIVE | Noted: 2022-06-08

## 2022-06-08 LAB — SARS-COV-2 RNA NOSE QL NAA+PROBE: NOT DETECTED

## 2022-06-08 PROCEDURE — U0004 COV-19 TEST NON-CDC HGH THRU: HCPCS

## 2022-06-08 PROCEDURE — C9803 HOPD COVID-19 SPEC COLLECT: HCPCS

## 2022-06-08 PROCEDURE — U0005 INFEC AGEN DETEC AMPLI PROBE: HCPCS

## 2022-06-08 RX ORDER — ACETAMINOPHEN 500 MG
500 TABLET ORAL EVERY 6 HOURS PRN
COMMUNITY

## 2022-06-10 ENCOUNTER — ANESTHESIA (OUTPATIENT)
Dept: GASTROENTEROLOGY | Facility: HOSPITAL | Age: 77
End: 2022-06-10

## 2022-06-10 ENCOUNTER — HOSPITAL ENCOUNTER (OUTPATIENT)
Facility: HOSPITAL | Age: 77
Setting detail: HOSPITAL OUTPATIENT SURGERY
Discharge: HOME | End: 2022-06-10
Attending: SURGERY | Admitting: SURGERY

## 2022-06-10 ENCOUNTER — ANESTHESIA EVENT (OUTPATIENT)
Dept: GASTROENTEROLOGY | Facility: HOSPITAL | Age: 77
End: 2022-06-10

## 2022-06-10 VITALS
HEIGHT: 63 IN | SYSTOLIC BLOOD PRESSURE: 136 MMHG | TEMPERATURE: 98 F | BODY MASS INDEX: 26.58 KG/M2 | RESPIRATION RATE: 16 BRPM | WEIGHT: 150 LBS | DIASTOLIC BLOOD PRESSURE: 66 MMHG | OXYGEN SATURATION: 94 % | HEART RATE: 90 BPM

## 2022-06-10 DIAGNOSIS — D64.9 ANEMIA, UNSPECIFIED TYPE: ICD-10-CM

## 2022-06-10 PROCEDURE — 45378 DIAGNOSTIC COLONOSCOPY: CPT | Performed by: SURGERY

## 2022-06-10 PROCEDURE — 88342 IMHCHEM/IMCYTCHM 1ST ANTB: CPT

## 2022-06-10 PROCEDURE — 82962 GLUCOSE BLOOD TEST: CPT

## 2022-06-10 PROCEDURE — 25010000002 PROPOFOL 1000 MG/100ML EMULSION: Performed by: NURSE ANESTHETIST, CERTIFIED REGISTERED

## 2022-06-10 PROCEDURE — 43239 EGD BIOPSY SINGLE/MULTIPLE: CPT | Performed by: SURGERY

## 2022-06-10 PROCEDURE — 88305 TISSUE EXAM BY PATHOLOGIST: CPT

## 2022-06-10 PROCEDURE — 94640 AIRWAY INHALATION TREATMENT: CPT

## 2022-06-10 PROCEDURE — 88313 SPECIAL STAINS GROUP 2: CPT

## 2022-06-10 RX ORDER — SODIUM CHLORIDE 0.9 % (FLUSH) 0.9 %
10 SYRINGE (ML) INJECTION AS NEEDED
Status: DISCONTINUED | OUTPATIENT
Start: 2022-06-10 | End: 2022-06-10 | Stop reason: HOSPADM

## 2022-06-10 RX ORDER — SODIUM CHLORIDE 0.9 % (FLUSH) 0.9 %
10 SYRINGE (ML) INJECTION EVERY 12 HOURS SCHEDULED
Status: DISCONTINUED | OUTPATIENT
Start: 2022-06-10 | End: 2022-06-10 | Stop reason: HOSPADM

## 2022-06-10 RX ORDER — IPRATROPIUM BROMIDE AND ALBUTEROL SULFATE 2.5; .5 MG/3ML; MG/3ML
3 SOLUTION RESPIRATORY (INHALATION)
Status: DISCONTINUED | OUTPATIENT
Start: 2022-06-10 | End: 2022-06-10

## 2022-06-10 RX ORDER — PROPOFOL 10 MG/ML
INJECTION, EMULSION INTRAVENOUS AS NEEDED
Status: DISCONTINUED | OUTPATIENT
Start: 2022-06-10 | End: 2022-06-10 | Stop reason: SURG

## 2022-06-10 RX ORDER — IPRATROPIUM BROMIDE AND ALBUTEROL SULFATE 2.5; .5 MG/3ML; MG/3ML
3 SOLUTION RESPIRATORY (INHALATION) ONCE
Status: COMPLETED | OUTPATIENT
Start: 2022-06-10 | End: 2022-06-10

## 2022-06-10 RX ORDER — LIDOCAINE HYDROCHLORIDE 20 MG/ML
INJECTION, SOLUTION INTRAVENOUS AS NEEDED
Status: DISCONTINUED | OUTPATIENT
Start: 2022-06-10 | End: 2022-06-10 | Stop reason: SURG

## 2022-06-10 RX ORDER — SODIUM CHLORIDE, SODIUM LACTATE, POTASSIUM CHLORIDE, CALCIUM CHLORIDE 600; 310; 30; 20 MG/100ML; MG/100ML; MG/100ML; MG/100ML
50 INJECTION, SOLUTION INTRAVENOUS CONTINUOUS
Status: DISCONTINUED | OUTPATIENT
Start: 2022-06-10 | End: 2022-06-10 | Stop reason: HOSPADM

## 2022-06-10 RX ADMIN — LIDOCAINE HYDROCHLORIDE 60 MG: 20 INJECTION, SOLUTION INTRAVENOUS at 09:36

## 2022-06-10 RX ADMIN — PROPOFOL 50 MG: 10 INJECTION, EMULSION INTRAVENOUS at 09:56

## 2022-06-10 RX ADMIN — PROPOFOL 100 MG: 10 INJECTION, EMULSION INTRAVENOUS at 09:36

## 2022-06-10 RX ADMIN — SODIUM CHLORIDE, POTASSIUM CHLORIDE, SODIUM LACTATE AND CALCIUM CHLORIDE 50 ML/HR: 600; 310; 30; 20 INJECTION, SOLUTION INTRAVENOUS at 09:23

## 2022-06-10 RX ADMIN — IPRATROPIUM BROMIDE AND ALBUTEROL SULFATE 3 ML: 2.5; .5 SOLUTION RESPIRATORY (INHALATION) at 11:22

## 2022-06-10 RX ADMIN — PROPOFOL 50 MG: 10 INJECTION, EMULSION INTRAVENOUS at 10:28

## 2022-06-10 RX ADMIN — PROPOFOL 50 MG: 10 INJECTION, EMULSION INTRAVENOUS at 10:16

## 2022-06-10 NOTE — ANESTHESIA PREPROCEDURE EVALUATION
Anesthesia Evaluation     Patient summary reviewed and Nursing notes reviewed   history of anesthetic complications: PONV  NPO Solid Status: > 8 hours  NPO Liquid Status: > 8 hours           Airway   Mallampati: II  TM distance: >3 FB  Neck ROM: full  no difficulty expected  Dental    (+) upper dentures and lower dentures    Pulmonary - normal exam   (+) pneumonia resolved ,   (-) not a smoker  Cardiovascular - normal exam  Exercise tolerance: good (4-7 METS)    PT is on anticoagulation therapy    (+) hypertension well controlled, past MI  >12 months, CAD, CABG, CHF , hyperlipidemia,       Neuro/Psych  (+) seizures (related to migraines, has not had one in a long time), TIA, headaches (migraines), psychiatric history Depression,    GI/Hepatic/Renal/Endo    (+)  GERD,  diabetes mellitus (FSBS 95) well controlled,     Musculoskeletal     (+) arthralgias, back pain, chronic pain,   Abdominal    Substance History - negative use     OB/GYN          Other   arthritis,                        Anesthesia Plan    ASA 3     MAC     (Risks and benefits discussed including risk of aspiration, recall and dental damage. All patient questions answered. Will continue with POC.)  intravenous induction     Anesthetic plan, risks, benefits, and alternatives have been provided, discussed and informed consent has been obtained with: patient.

## 2022-06-10 NOTE — ANESTHESIA POSTPROCEDURE EVALUATION
Patient: Nette Montalvo    Procedure Summary     Date: 06/10/22 Room / Location: [facility] END[facility]COPY [facility]    Anesthesia Start: 0937 Anesthesia Stop: 1057    Procedures:       COLONOSCOPY (N/A )      ESOPHAGOGASTRODUODENOSCOPY WITH BIOPSY (N/A Esophagus) Diagnosis:       Anemia, unspecified type      (Anemia, unspecified type [D64.9])    Surgeons: Susan Colon MD Provider: Nato Barrow CRNA    Anesthesia Type: MAC ASA Status: 3          Anesthesia Type: MAC    Vitals  No vitals data found for the desired time range.          Post Anesthesia Care and Evaluation    Patient location during evaluation: bedside  Patient participation: complete - patient participated  Level of consciousness: awake  Pain score: 0  Pain management: adequate    Airway patency: patent  Anesthetic complications: No anesthetic complications  PONV Status: controlled  Cardiovascular status: acceptable and stable  Respiratory status: acceptable and room air  Hydration status: acceptable    Comments: Vital signs as noted in nursing documentation as per protocol

## 2022-06-13 LAB — GLUCOSE BLDC GLUCOMTR-MCNC: 132 MG/DL (ref 70–130)

## 2022-06-14 LAB — REF LAB TEST METHOD: NORMAL

## 2022-07-19 NOTE — PROGRESS NOTES
No source of blood loss identified on recent EGD and colonoscopy.  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  Testing for H. Pylori negative.  Normal small bowel biopsies.  Normal colonoscopy.  No repeat screening/surveillance EGD or colonoscopy needed due to patient age. No intervention needed unless patient develops symptoms related to the upper GI tract.  No follow up with me needed.  Please send letter.

## 2023-09-09 ENCOUNTER — APPOINTMENT (OUTPATIENT)
Dept: GENERAL RADIOLOGY | Facility: HOSPITAL | Age: 78
End: 2023-09-09
Payer: COMMERCIAL

## 2023-09-09 ENCOUNTER — APPOINTMENT (OUTPATIENT)
Dept: CT IMAGING | Facility: HOSPITAL | Age: 78
End: 2023-09-09
Payer: MEDICARE

## 2023-09-09 ENCOUNTER — HOSPITAL ENCOUNTER (EMERGENCY)
Facility: HOSPITAL | Age: 78
Discharge: HOME | End: 2023-09-09
Attending: STUDENT IN AN ORGANIZED HEALTH CARE EDUCATION/TRAINING PROGRAM
Payer: MEDICARE

## 2023-09-09 VITALS
OXYGEN SATURATION: 94 % | SYSTOLIC BLOOD PRESSURE: 142 MMHG | RESPIRATION RATE: 24 BRPM | DIASTOLIC BLOOD PRESSURE: 79 MMHG | TEMPERATURE: 98.2 F | WEIGHT: 154.2 LBS | HEIGHT: 64 IN | BODY MASS INDEX: 26.32 KG/M2 | HEART RATE: 126 BPM

## 2023-09-09 DIAGNOSIS — E83.42 HYPOMAGNESEMIA: ICD-10-CM

## 2023-09-09 DIAGNOSIS — I48.91 ATRIAL FIBRILLATION WITH RVR: Primary | ICD-10-CM

## 2023-09-09 LAB
ALBUMIN SERPL-MCNC: 4.2 G/DL (ref 3.5–5.2)
ALBUMIN/GLOB SERPL: 1.9 G/DL
ALP SERPL-CCNC: 67 U/L (ref 39–117)
ALT SERPL W P-5'-P-CCNC: 12 U/L (ref 1–33)
ANION GAP SERPL CALCULATED.3IONS-SCNC: 13.9 MMOL/L (ref 5–15)
ANISOCYTOSIS BLD QL: NORMAL
AST SERPL-CCNC: 17 U/L (ref 1–32)
BACTERIA UR QL AUTO: ABNORMAL /HPF
BASOPHILS # BLD AUTO: 0.07 10*3/MM3 (ref 0–0.2)
BASOPHILS NFR BLD AUTO: 0.9 % (ref 0–1.5)
BILIRUB SERPL-MCNC: 0.3 MG/DL (ref 0–1.2)
BILIRUB UR QL STRIP: NEGATIVE
BUN SERPL-MCNC: 15 MG/DL (ref 8–23)
BUN/CREAT SERPL: 17 (ref 7–25)
CALCIUM SPEC-SCNC: 9 MG/DL (ref 8.6–10.5)
CHLORIDE SERPL-SCNC: 105 MMOL/L (ref 98–107)
CLARITY UR: CLEAR
CO2 SERPL-SCNC: 21.1 MMOL/L (ref 22–29)
COLOR UR: YELLOW
CREAT SERPL-MCNC: 0.88 MG/DL (ref 0.57–1)
DEPRECATED RDW RBC AUTO: 46.4 FL (ref 37–54)
EGFRCR SERPLBLD CKD-EPI 2021: 66.5 ML/MIN/1.73
EOSINOPHIL # BLD AUTO: 0.14 10*3/MM3 (ref 0–0.4)
EOSINOPHIL NFR BLD AUTO: 1.8 % (ref 0.3–6.2)
ERYTHROCYTE [DISTWIDTH] IN BLOOD BY AUTOMATED COUNT: 15.9 % (ref 12.3–15.4)
FLUAV RNA RESP QL NAA+PROBE: NOT DETECTED
FLUBV RNA RESP QL NAA+PROBE: NOT DETECTED
GLOBULIN UR ELPH-MCNC: 2.2 GM/DL
GLUCOSE SERPL-MCNC: 146 MG/DL (ref 65–99)
GLUCOSE UR STRIP-MCNC: NEGATIVE MG/DL
HCT VFR BLD AUTO: 33.8 % (ref 34–46.6)
HGB BLD-MCNC: 9.9 G/DL (ref 12–15.9)
HGB UR QL STRIP.AUTO: NEGATIVE
HOLD SPECIMEN: NORMAL
HOLD SPECIMEN: NORMAL
HYALINE CASTS UR QL AUTO: ABNORMAL /LPF
HYPOCHROMIA BLD QL: NORMAL
IMM GRANULOCYTES # BLD AUTO: 0.1 10*3/MM3 (ref 0–0.05)
IMM GRANULOCYTES NFR BLD AUTO: 1.3 % (ref 0–0.5)
KETONES UR QL STRIP: NEGATIVE
LEUKOCYTE ESTERASE UR QL STRIP.AUTO: ABNORMAL
LYMPHOCYTES # BLD AUTO: 1.83 10*3/MM3 (ref 0.7–3.1)
LYMPHOCYTES NFR BLD AUTO: 23 % (ref 19.6–45.3)
MAGNESIUM SERPL-MCNC: 1.4 MG/DL (ref 1.6–2.4)
MCH RBC QN AUTO: 23.7 PG (ref 26.6–33)
MCHC RBC AUTO-ENTMCNC: 29.3 G/DL (ref 31.5–35.7)
MCV RBC AUTO: 81.1 FL (ref 79–97)
MONOCYTES # BLD AUTO: 0.45 10*3/MM3 (ref 0.1–0.9)
MONOCYTES NFR BLD AUTO: 5.6 % (ref 5–12)
NEUTROPHILS NFR BLD AUTO: 5.38 10*3/MM3 (ref 1.7–7)
NEUTROPHILS NFR BLD AUTO: 67.4 % (ref 42.7–76)
NITRITE UR QL STRIP: NEGATIVE
NRBC BLD AUTO-RTO: 0 /100 WBC (ref 0–0.2)
NT-PROBNP SERPL-MCNC: 1188 PG/ML (ref 0–1800)
PH UR STRIP.AUTO: 7.5 [PH] (ref 5–8)
PLATELET # BLD AUTO: 212 10*3/MM3 (ref 140–450)
PMV BLD AUTO: 11.7 FL (ref 6–12)
POLYCHROMASIA BLD QL SMEAR: NORMAL
POTASSIUM SERPL-SCNC: 4.3 MMOL/L (ref 3.5–5.2)
PROT SERPL-MCNC: 6.4 G/DL (ref 6–8.5)
PROT UR QL STRIP: NEGATIVE
RBC # BLD AUTO: 4.17 10*6/MM3 (ref 3.77–5.28)
RBC # UR STRIP: ABNORMAL /HPF
REF LAB TEST METHOD: ABNORMAL
SARS-COV-2 RNA RESP QL NAA+PROBE: NOT DETECTED
SMALL PLATELETS BLD QL SMEAR: ADEQUATE
SODIUM SERPL-SCNC: 140 MMOL/L (ref 136–145)
SP GR UR STRIP: 1.01 (ref 1–1.03)
SQUAMOUS #/AREA URNS HPF: ABNORMAL /HPF
TROPONIN T SERPL HS-MCNC: 17 NG/L
TROPONIN T SERPL HS-MCNC: 20 NG/L
UROBILINOGEN UR QL STRIP: ABNORMAL
WBC # UR STRIP: ABNORMAL /HPF
WBC MORPH BLD: NORMAL
WBC NRBC COR # BLD: 7.97 10*3/MM3 (ref 3.4–10.8)
WHOLE BLOOD HOLD COAG: NORMAL
WHOLE BLOOD HOLD SPECIMEN: NORMAL

## 2023-09-09 PROCEDURE — 83735 ASSAY OF MAGNESIUM: CPT

## 2023-09-09 PROCEDURE — 83880 ASSAY OF NATRIURETIC PEPTIDE: CPT | Performed by: NURSE PRACTITIONER

## 2023-09-09 PROCEDURE — 85025 COMPLETE CBC W/AUTO DIFF WBC: CPT

## 2023-09-09 PROCEDURE — 84484 ASSAY OF TROPONIN QUANT: CPT | Performed by: NURSE PRACTITIONER

## 2023-09-09 PROCEDURE — 36415 COLL VENOUS BLD VENIPUNCTURE: CPT

## 2023-09-09 PROCEDURE — 25510000001 IOPAMIDOL 61 % SOLUTION: Performed by: STUDENT IN AN ORGANIZED HEALTH CARE EDUCATION/TRAINING PROGRAM

## 2023-09-09 PROCEDURE — 81001 URINALYSIS AUTO W/SCOPE: CPT

## 2023-09-09 PROCEDURE — 96361 HYDRATE IV INFUSION ADD-ON: CPT

## 2023-09-09 PROCEDURE — 25010000002 DIPHENHYDRAMINE PER 50 MG: Performed by: NURSE PRACTITIONER

## 2023-09-09 PROCEDURE — 84484 ASSAY OF TROPONIN QUANT: CPT

## 2023-09-09 PROCEDURE — 71275 CT ANGIOGRAPHY CHEST: CPT

## 2023-09-09 PROCEDURE — 96375 TX/PRO/DX INJ NEW DRUG ADDON: CPT

## 2023-09-09 PROCEDURE — 87636 SARSCOV2 & INF A&B AMP PRB: CPT

## 2023-09-09 PROCEDURE — 80053 COMPREHEN METABOLIC PANEL: CPT

## 2023-09-09 PROCEDURE — 25010000002 METHYLPREDNISOLONE PER 40 MG: Performed by: NURSE PRACTITIONER

## 2023-09-09 PROCEDURE — 85007 BL SMEAR W/DIFF WBC COUNT: CPT

## 2023-09-09 PROCEDURE — 25010000002 MAGNESIUM SULFATE 2 GM/50ML SOLUTION: Performed by: NURSE PRACTITIONER

## 2023-09-09 PROCEDURE — 96365 THER/PROPH/DIAG IV INF INIT: CPT

## 2023-09-09 PROCEDURE — 71045 X-RAY EXAM CHEST 1 VIEW: CPT

## 2023-09-09 PROCEDURE — 99285 EMERGENCY DEPT VISIT HI MDM: CPT

## 2023-09-09 RX ORDER — MAGNESIUM OXIDE 400 MG/1
400 TABLET ORAL DAILY
Qty: 30 TABLET | Refills: 0 | Status: SHIPPED | OUTPATIENT
Start: 2023-09-09

## 2023-09-09 RX ORDER — MAGNESIUM SULFATE HEPTAHYDRATE 40 MG/ML
2 INJECTION, SOLUTION INTRAVENOUS ONCE
Status: COMPLETED | OUTPATIENT
Start: 2023-09-09 | End: 2023-09-09

## 2023-09-09 RX ORDER — DILTIAZEM HYDROCHLORIDE 120 MG/1
120 CAPSULE, COATED, EXTENDED RELEASE ORAL ONCE
Status: COMPLETED | OUTPATIENT
Start: 2023-09-09 | End: 2023-09-09

## 2023-09-09 RX ORDER — METHYLPREDNISOLONE SODIUM SUCCINATE 40 MG/ML
40 INJECTION, POWDER, LYOPHILIZED, FOR SOLUTION INTRAMUSCULAR; INTRAVENOUS EVERY 4 HOURS
Status: DISCONTINUED | OUTPATIENT
Start: 2023-09-09 | End: 2023-09-09 | Stop reason: HOSPADM

## 2023-09-09 RX ORDER — DIPHENHYDRAMINE HYDROCHLORIDE 50 MG/ML
50 INJECTION INTRAMUSCULAR; INTRAVENOUS
Status: COMPLETED | OUTPATIENT
Start: 2023-09-09 | End: 2023-09-09

## 2023-09-09 RX ORDER — DILTIAZEM HYDROCHLORIDE 5 MG/ML
10 INJECTION INTRAVENOUS ONCE
Status: COMPLETED | OUTPATIENT
Start: 2023-09-09 | End: 2023-09-09

## 2023-09-09 RX ORDER — SODIUM CHLORIDE 0.9 % (FLUSH) 0.9 %
10 SYRINGE (ML) INJECTION AS NEEDED
Status: DISCONTINUED | OUTPATIENT
Start: 2023-09-09 | End: 2023-09-09 | Stop reason: HOSPADM

## 2023-09-09 RX ORDER — DILTIAZEM HYDROCHLORIDE 120 MG/1
120 CAPSULE, COATED, EXTENDED RELEASE ORAL DAILY
Qty: 30 CAPSULE | Refills: 0 | Status: SHIPPED | OUTPATIENT
Start: 2023-09-09 | End: 2023-09-09 | Stop reason: SDUPTHER

## 2023-09-09 RX ORDER — DILTIAZEM HYDROCHLORIDE 120 MG/1
120 CAPSULE, COATED, EXTENDED RELEASE ORAL DAILY
Qty: 30 CAPSULE | Refills: 0 | Status: SHIPPED | OUTPATIENT
Start: 2023-09-09

## 2023-09-09 RX ADMIN — SODIUM CHLORIDE 1000 ML: 9 INJECTION, SOLUTION INTRAVENOUS at 13:20

## 2023-09-09 RX ADMIN — SODIUM CHLORIDE 1000 ML: 9 INJECTION, SOLUTION INTRAVENOUS at 14:53

## 2023-09-09 RX ADMIN — IOPAMIDOL 100 ML: 612 INJECTION, SOLUTION INTRAVENOUS at 17:28

## 2023-09-09 RX ADMIN — DILTIAZEM HYDROCHLORIDE 10 MG: 5 INJECTION INTRAVENOUS at 19:33

## 2023-09-09 RX ADMIN — METHYLPREDNISOLONE SODIUM SUCCINATE 40 MG: 40 INJECTION, POWDER, FOR SOLUTION INTRAMUSCULAR; INTRAVENOUS at 16:35

## 2023-09-09 RX ADMIN — DIPHENHYDRAMINE HYDROCHLORIDE 50 MG: 50 INJECTION INTRAMUSCULAR; INTRAVENOUS at 16:37

## 2023-09-09 RX ADMIN — MAGNESIUM SULFATE HEPTAHYDRATE 2 G: 40 INJECTION, SOLUTION INTRAVENOUS at 13:21

## 2023-09-09 RX ADMIN — DILTIAZEM HYDROCHLORIDE 120 MG: 120 CAPSULE, COATED, EXTENDED RELEASE ORAL at 21:04

## 2023-09-09 RX ADMIN — APIXABAN 5 MG: 2.5 TABLET, FILM COATED ORAL at 21:25

## 2023-09-09 NOTE — ED PROVIDER NOTES
Subjective:  History of Present Illness:    Patient is a 78-year-old female with history of T2DM, CAD, and HTN.  She presents to the ER today with reports that she had some generalized weakness and shortness of air yesterday.  Heart rate jumped up to 130s 140s today.  Currently denies shortness of air or chest pain.  Denies dizziness.  Denies OTC medication or home remedy.  Denies alleviating or exacerbating factors.    Nurses Notes reviewed and agree, including vitals, allergies, social history and prior medical history.     REVIEW OF SYSTEMS: All systems reviewed and not pertinent unless noted.  Review of Systems   Constitutional:  Positive for fatigue.   Respiratory:  Positive for shortness of breath.    Cardiovascular:  Positive for palpitations.   All other systems reviewed and are negative.    Past Medical History:   Diagnosis Date    Anxiety     Arthritis     Cataract, bilateral     BILATERAL    Chest pain     none recent    CHF (congestive heart failure)     Coronary artery disease     Depression     Diabetes mellitus     Disease of thyroid gland     Fracture     RIGHT SHOULDER FX, LEFT ANKLE FRACTURE    Full dentures     GERD (gastroesophageal reflux disease)     Heart disease     High cholesterol     History of nuclear stress test 2016    Hypertension     Migraine     Myocardial infarction 2006    s/p CABG x 4    Pneumonia     PONV (postoperative nausea and vomiting)     Seasonal allergies     Seizure     DAUGHTER STATED POSSIBLE SEIZURES.  STATES POSSIBLE MIGRANES/SEIZURES    Skin cancer     face and feet    Wears glasses        Allergies:    Ceftin [cefuroxime axetil] and Contrast dye (echo or unknown ct/mr)      Past Surgical History:   Procedure Laterality Date    APPENDECTOMY      CARDIAC CATHETERIZATION      CATARACT EXTRACTION W/ INTRAOCULAR LENS IMPLANT Left 09/12/2017    Procedure: CATARACT PHACO EXTRACTION WITH INTRAOCULAR LENS IMPLANT LEFT;  Surgeon: Judy Clifton MD;  Location:   "[facility];  Service:     CATARACT EXTRACTION W/ INTRAOCULAR LENS IMPLANT Right 08/14/2018    Procedure: CATARACT PHACO EXTRACTION WITH INTRAOCULAR LENS IMPLANT RIGHT;  Surgeon: Judy Clifton MD;  Location: [facility] OR;  Service: Ophthalmology    COLONOSCOPY N/A 6/10/2022    Procedure: COLONOSCOPY;  Surgeon: Susan Colon MD;  Location: [facility] ENDOSCOPY;  Service: Gastroenterology;  Laterality: N/A;    CORONARY ARTERY BYPASS GRAFT  2006    CABG x 4    ENDOSCOPY N/A 6/10/2022    Procedure: ESOPHAGOGASTRODUODENOSCOPY WITH BIOPSY;  Surgeon: Susan Colon MD;  Location: [facility] ENDOSCOPY;  Service: Gastroenterology;  Laterality: N/A;    SKIN BIOPSY      SKIN CANCER EXCISION      face and foot    TISSUE AORTIC VALVE REPLACEMENT  2017    bovine valve    TOTAL ABDOMINAL HYSTERECTOMY WITH SALPINGO OOPHORECTOMY           Social History     Socioeconomic History    Marital status:    Tobacco Use    Smoking status: Never    Smokeless tobacco: Never   Vaping Use    Vaping Use: Never used   Substance and Sexual Activity    Alcohol use: No    Drug use: No    Sexual activity: Defer         Family History   Problem Relation Age of Onset    Cervical cancer Mother     Heart attack Father     Heart disease Father     Breast cancer Sister     Heart disease Other     Diabetes Other     Cancer Other     Colon cancer Neg Hx        Objective  Physical Exam:  /79   Pulse (!) 126   Temp 98.2 °F (36.8 °C)   Resp 24   Ht 162.6 cm (64\")   Wt 69.9 kg (154 lb 3.2 oz)   SpO2 94%   BMI 26.47 kg/m²      Physical Exam  Vitals and nursing note reviewed.   Constitutional:       Appearance: She is well-developed and normal weight.   HENT:      Head: Normocephalic and atraumatic.      Mouth/Throat:      Mouth: Mucous membranes are moist.      Pharynx: Oropharynx is clear.   Eyes:      Extraocular Movements: Extraocular movements intact.      Pupils: Pupils are equal, round, and reactive to light.   Cardiovascular:      " Rate and Rhythm: Tachycardia present.   Pulmonary:      Effort: Pulmonary effort is normal.      Breath sounds: Normal breath sounds.   Abdominal:      General: Bowel sounds are normal.      Palpations: Abdomen is soft.   Musculoskeletal:         General: Normal range of motion.      Cervical back: Normal range of motion and neck supple.   Skin:     General: Skin is warm and dry.      Capillary Refill: Capillary refill takes less than 2 seconds.   Neurological:      General: No focal deficit present.      Mental Status: She is alert and oriented to person, place, and time.   Psychiatric:         Mood and Affect: Mood normal.         Behavior: Behavior normal.       Procedures    ED Course:    ED Course as of 09/09/23 2121   Sat Sep 09, 2023   1311 KG interpreted by me, sinus tachycardia with no concerning ST changes noted, rate of 136 [JE]      ED Course User Index  [JE] Anil Samaniego MD       Lab Results (last 24 hours)       Procedure Component Value Units Date/Time    CBC & Differential [[id number]]  (Abnormal) Collected: 09/09/23 1214    Specimen: Blood Updated: 09/09/23 1237    Narrative:      The following orders were created for panel order CBC & Differential.  Procedure                               Abnormality         Status                     ---------                               -----------         ------                     CBC Auto Differential[[id number]]        Abnormal            Final result               Scan Slide[[id number]]                                       Final result                 Please view results for these tests on the individual orders.    Comprehensive Metabolic Panel [[id number]]  (Abnormal) Collected: 09/09/23 1214    Specimen: Blood Updated: 09/09/23 1239     Glucose 146 mg/dL      BUN 15 mg/dL      Creatinine 0.88 mg/dL      Sodium 140 mmol/L      Potassium 4.3 mmol/L      Chloride 105 mmol/L      CO2 21.1 mmol/L      Calcium 9.0 mg/dL      Total Protein 6.4 g/dL       Albumin 4.2 g/dL      ALT (SGPT) 12 U/L      AST (SGOT) 17 U/L      Alkaline Phosphatase 67 U/L      Total Bilirubin 0.3 mg/dL      Globulin 2.2 gm/dL      A/G Ratio 1.9 g/dL      BUN/Creatinine Ratio 17.0     Anion Gap 13.9 mmol/L      eGFR 66.5 mL/min/1.73     Narrative:      GFR Normal >60  Chronic Kidney Disease <60  Kidney Failure <15    The GFR formula is only valid for adults with stable renal function between ages 18 and 70.    Single High Sensitivity Troponin T [[id number]]  (Abnormal) Collected: 09/09/23 1214    Specimen: Blood Updated: 09/09/23 1242     HS Troponin T 17 ng/L     Narrative:      High Sensitive Troponin T Reference Range:  <10.0 ng/L- Negative Female for AMI  <15.0 ng/L- Negative Male for AMI  >=10 - Abnormal Female indicating possible myocardial injury.  >=15 - Abnormal Male indicating possible myocardial injury.   Clinicians would have to utilize clinical acumen, EKG, Troponin, and serial changes to determine if it is an Acute Myocardial Infarction or myocardial injury due to an underlying chronic condition.         Magnesium [[id number]]  (Abnormal) Collected: 09/09/23 1214    Specimen: Blood Updated: 09/09/23 1239     Magnesium 1.4 mg/dL     CBC Auto Differential [[id number]]  (Abnormal) Collected: 09/09/23 1214    Specimen: Blood Updated: 09/09/23 1224     WBC 7.97 10*3/mm3      RBC 4.17 10*6/mm3      Hemoglobin 9.9 g/dL      Hematocrit 33.8 %      MCV 81.1 fL      MCH 23.7 pg      MCHC 29.3 g/dL      RDW 15.9 %      RDW-SD 46.4 fl      MPV 11.7 fL      Platelets 212 10*3/mm3      Neutrophil % 67.4 %      Lymphocyte % 23.0 %      Monocyte % 5.6 %      Eosinophil % 1.8 %      Basophil % 0.9 %      Immature Grans % 1.3 %      Neutrophils, Absolute 5.38 10*3/mm3      Lymphocytes, Absolute 1.83 10*3/mm3      Monocytes, Absolute 0.45 10*3/mm3      Eosinophils, Absolute 0.14 10*3/mm3      Basophils, Absolute 0.07 10*3/mm3      Immature Grans, Absolute 0.10 10*3/mm3      nRBC 0.0 /100 WBC      BNP [[id number]]  (Normal) Collected: 09/09/23 1214    Specimen: Blood Updated: 09/09/23 1242     proBNP 1,188.0 pg/mL     Narrative:      Among patients with dyspnea, NT-proBNP is highly sensitive for the detection of acute congestive heart failure. In addition NT-proBNP of <300 pg/ml effectively rules out acute congestive heart failure with 99% negative predictive value.      Scan Slide [[id number]] Collected: 09/09/23 1214    Specimen: Blood Updated: 09/09/23 1237     Anisocytosis Slight/1+     Hypochromia Slight/1+     Polychromasia Slight/1+     WBC Morphology Normal     Platelet Estimate Adequate    COVID-19 and FLU A/B PCR - Swab, Nasopharynx [[id number]]  (Normal) Collected: 09/09/23 1215    Specimen: Swab from Nasopharynx Updated: 09/09/23 1248     COVID19 Not Detected     Influenza A PCR Not Detected     Influenza B PCR Not Detected    Narrative:      Fact sheet for providers: https://www.fda.gov/media/469056/download    Fact sheet for patients: https://www.fda.gov/media/204322/download    Test performed by PCR.    Urinalysis With Microscopic If Indicated (No Culture) - Urine, Clean Catch [[id number]]  (Abnormal) Collected: 09/09/23 1325    Specimen: Urine, Clean Catch Updated: 09/09/23 1335     Color, UA Yellow     Appearance, UA Clear     pH, UA 7.5     Specific Gravity, UA 1.015     Glucose, UA Negative     Ketones, UA Negative     Bilirubin, UA Negative     Blood, UA Negative     Protein, UA Negative     Leuk Esterase, UA Small (1+)     Nitrite, UA Negative     Urobilinogen, UA 0.2 E.U./dL    Urinalysis, Microscopic Only - Urine, Clean Catch [[id number]]  (Abnormal) Collected: 09/09/23 1325    Specimen: Urine, Clean Catch Updated: 09/09/23 1345     RBC, UA None Seen /HPF      WBC, UA 3-5 /HPF      Bacteria, UA Trace /HPF      Squamous Epithelial Cells, UA 0-2 /HPF      Hyaline Casts, UA None Seen /LPF      Methodology Manual Light Microscopy    Single High Sensitivity Troponin T [[id number]]  (Abnormal)  Collected: 09/09/23 1456    Specimen: Blood Updated: 09/09/23 1522     HS Troponin T 20 ng/L     Narrative:      High Sensitive Troponin T Reference Range:  <10.0 ng/L- Negative Female for AMI  <15.0 ng/L- Negative Male for AMI  >=10 - Abnormal Female indicating possible myocardial injury.  >=15 - Abnormal Male indicating possible myocardial injury.   Clinicians would have to utilize clinical acumen, EKG, Troponin, and serial changes to determine if it is an Acute Myocardial Infarction or myocardial injury due to an underlying chronic condition.                  XR Chest 1 View    Result Date: 9/9/2023  PROCEDURE: XR CHEST 1 VW-  INDICATION:  Weak/Dizzy/AMS triage protocol  FINDINGS:  A portable view of the chest was obtained.  Comparison is made to a prior exam dated 3/24/2017.   Patient is again noted to be status post median sternotomy. Cardiac and mediastinal silhouettes are normal. There is discoid opacity at the left lung base concerning for atelectasis. Lungs are otherwise clear. No pleural effusion or pneumothorax.      Impression: Left basilar atelectasis.   This report was signed and finalized on 9/9/2023 1:08 PM by Taylor Stephens MD.          MDM     Amount and/or Complexity of Data Reviewed  Clinical lab tests: reviewed  Tests in the radiology section of CPT®: reviewed  Decide to obtain previous medical records or to obtain history from someone other than the patient: yes        Initial impression of presenting illness: Patient is a 78-year-old female with history of T2DM, CAD, and HTN.  She presents to the ER today with reports that she had some generalized weakness and shortness of air yesterday.  Heart rate jumped up to 130s 140s today.  Currently denies shortness of air or chest pain.  Denies dizziness.  Denies OTC medication or home remedy.  Denies alleviating or exacerbating factors.    DDX: includes but is not limited to: Sinus tachycardia, atrial fibrillation, palpitation    Patient arrives  stable with vitals interpreted by myself.     Pertinent features from physical exam: Bilateral lung sounds are clear bilaterally.  Abdomen soft nontender.  Bowel sounds are normal.  Cardiac sounds are normal..    Initial diagnostic plan: Troponin, BNP, CBC, CMP, UA, chest, CT angiogram chest pulmonary embolism protocol    Results from initial plan were reviewed and interpreted by me revealing BNP was with mild elevation.  CBC within appropriate range.  Troponin was mildly elevated but flat line with repeat UA with trace bacteria but is without symptoms.  PE protocol no pulmonary emboli or dissection.  Lung nodules follow-up CT in 3 to 6 months as recommended per Fleischner criteria    Diagnostic information from other sources: Chart review    Interventions / Re-evaluation: Patient received 2 L normal saline while in ER.  Heart rate decreased from 140 bpm to 130 bpm.  10 mg of Cardizem    Results/clinical rationale were discussed with patient    Consultations/Discussion of results with other physicians: Dr. Roberto Samaniego/Dr morin    Disposition plan: 21:21 EDT    -----I received care from nurse practitioner regards to follow-up of response to Cardizem.  Repeat EKG revealed A-fib at a rate of 89 no ischemic changes as interpreted by me..  Discussed with Dr. Alfonso cardiology, reviewed EKG as well, recommended anticoagulation, since she responded well to IV Cardizem recommended oral Cardizem.  Outpatient follow-up.  Discussed results in regards to magnesium which was replaced, prescription for Eliquis, Cardizem  daily, magnesium oxide.    Patient was discharged in home stable condition.  Counseled on supportive care, outpatient follow-up. Return precaution discussed.  Patient/family was understanding and agreeable with plan          Final diagnoses:   Atrial fibrillation with RVR   Hypomagnesemia          Gaston Morin,   09/09/23 9091

## 2023-12-04 ENCOUNTER — OFFICE VISIT (OUTPATIENT)
Dept: CARDIOLOGY | Facility: CLINIC | Age: 78
End: 2023-12-04
Payer: MEDICARE

## 2023-12-04 VITALS
RESPIRATION RATE: 18 BRPM | WEIGHT: 150 LBS | DIASTOLIC BLOOD PRESSURE: 58 MMHG | HEIGHT: 64 IN | HEART RATE: 93 BPM | BODY MASS INDEX: 25.61 KG/M2 | SYSTOLIC BLOOD PRESSURE: 126 MMHG | OXYGEN SATURATION: 98 %

## 2023-12-04 DIAGNOSIS — E78.00 PURE HYPERCHOLESTEROLEMIA: ICD-10-CM

## 2023-12-04 DIAGNOSIS — I25.10 CORONARY ARTERY DISEASE INVOLVING NATIVE CORONARY ARTERY OF NATIVE HEART WITHOUT ANGINA PECTORIS: ICD-10-CM

## 2023-12-04 DIAGNOSIS — I35.0 AORTIC STENOSIS, SEVERE: ICD-10-CM

## 2023-12-04 DIAGNOSIS — I48.0 PAROXYSMAL ATRIAL FIBRILLATION: Primary | ICD-10-CM

## 2023-12-04 DIAGNOSIS — I10 PRIMARY HYPERTENSION: ICD-10-CM

## 2023-12-04 PROCEDURE — 93000 ELECTROCARDIOGRAM COMPLETE: CPT | Performed by: INTERNAL MEDICINE

## 2023-12-04 PROCEDURE — 99204 OFFICE O/P NEW MOD 45 MIN: CPT | Performed by: INTERNAL MEDICINE

## 2023-12-04 RX ORDER — AMIODARONE HYDROCHLORIDE 200 MG/1
200 TABLET ORAL DAILY
Qty: 90 TABLET | Refills: 1 | Status: SHIPPED | OUTPATIENT
Start: 2023-12-04 | End: 2023-12-04 | Stop reason: SDUPTHER

## 2023-12-04 RX ORDER — AMIODARONE HYDROCHLORIDE 200 MG/1
200 TABLET ORAL DAILY
Qty: 90 TABLET | Refills: 1 | Status: SHIPPED | OUTPATIENT
Start: 2023-12-04

## 2023-12-04 NOTE — PROGRESS NOTES
[facility] OP Consult Note    Nette Montalvo  [id number]  2023    Referred By: Carol Virgen APRN    Chief Complaint: Palpitations and fatigue    History of Present Illness:   Mrs. Nette Montalvo is a 78 y.o. female who presents to the Cardiology Clinic for evaluation of atrial fibrillation.  The patient has a past medical history significant for type 2 diabetes mellitus, hypothyroidism, hyperlipidemia, and hypertension.  She has a past cardiac history significant for multivessel coronary artery disease with remote CABG.  She also underwent TAVR due to severe aortic stenosis in 2017.  Her more recent medical history is significant for presentation to the emergency department for evaluation of palpitations.  At that time, she was found to be in atrial fibrillation with rapid ventricular rates in the 120s.  She was given diltiazem, with improvement in her heart rate.  She was subsequent discharged home and started on Eliquis for CVA prophylaxis.  Since discharge from the emergency department, the patient reports occasional episodes of palpitations.  The episodes may last several minutes to up to 1 hour before resolution.  She also reports fatigue.  No significant exertional dyspnea or orthopnea.  No history of significant lower extremity swelling.  She denies chest pain or exertional chest discomfort.  No other specific complaints today.    Past Cardiac Testin. Last Coronary Angio: Remote, records unavailable  2. Prior Stress Testing: None  3. Last Echo:    1.  Normal LV systolic function, LVEF 65%   2.  Severe aortic stenosis   3.  Mild concentric LVH   4.  Mild AI   5.  Mild MR   6.  Moderate TR  4. Prior Holter Monitor: None    Review of Systems:   Review of Systems   Constitutional:  Positive for fatigue. Negative for activity change, appetite change, chills, diaphoresis, fever, unexpected weight gain and unexpected weight loss.   Eyes:  Negative for blurred  vision and double vision.   Respiratory:  Negative for cough, chest tightness, shortness of breath and wheezing.    Cardiovascular:  Positive for palpitations. Negative for chest pain and leg swelling.   Gastrointestinal:  Negative for abdominal pain, anal bleeding, blood in stool and GERD.   Endocrine: Negative for cold intolerance and heat intolerance.   Genitourinary:  Negative for hematuria.   Neurological:  Negative for dizziness, syncope, weakness and light-headedness.   Hematological:  Does not bruise/bleed easily.   Psychiatric/Behavioral:  Negative for depressed mood and stress. The patient is not nervous/anxious.        Past Medical History:   Past Medical History:   Diagnosis Date    Anxiety     Arthritis     Cataract, bilateral     BILATERAL    Chest pain     none recent    CHF (congestive heart failure)     Coronary artery disease     Depression     Diabetes mellitus     Disease of thyroid gland     Fracture     RIGHT SHOULDER FX, LEFT ANKLE FRACTURE    Full dentures     GERD (gastroesophageal reflux disease)     Heart disease     High cholesterol     History of nuclear stress test 2016    Hypertension     Migraine     Myocardial infarction 2006    s/p CABG x 4    Pneumonia     PONV (postoperative nausea and vomiting)     Seasonal allergies     Seizure     DAUGHTER STATED POSSIBLE SEIZURES.  STATES POSSIBLE MIGRANES/SEIZURES    Skin cancer     face and feet    Wears glasses        Past Surgical History:   Past Surgical History:   Procedure Laterality Date    APPENDECTOMY      CARDIAC CATHETERIZATION      CATARACT EXTRACTION W/ INTRAOCULAR LENS IMPLANT Left 09/12/2017    Procedure: CATARACT PHACO EXTRACTION WITH INTRAOCULAR LENS IMPLANT LEFT;  Surgeon: Judy Clifton MD;  Location: [facility];  Service:     CATARACT EXTRACTION W/ INTRAOCULAR LENS IMPLANT Right 08/14/2018    Procedure: CATARACT PHACO EXTRACTION WITH INTRAOCULAR LENS IMPLANT RIGHT;  Surgeon: Judy Clifton MD;   Location: [facility] OR;  Service: Ophthalmology    COLONOSCOPY N/A 6/10/2022    Procedure: COLONOSCOPY;  Surgeon: Susan Colon MD;  Location: [facility] ENDOSCOPY;  Service: Gastroenterology;  Laterality: N/A;    CORONARY ARTERY BYPASS GRAFT  2006    CABG x 4    ENDOSCOPY N/A 6/10/2022    Procedure: ESOPHAGOGASTRODUODENOSCOPY WITH BIOPSY;  Surgeon: Susan Colon MD;  Location: [facility] ENDOSCOPY;  Service: Gastroenterology;  Laterality: N/A;    SKIN BIOPSY      SKIN CANCER EXCISION      face and foot    TISSUE AORTIC VALVE REPLACEMENT  2017    bovine valve    TOTAL ABDOMINAL HYSTERECTOMY WITH SALPINGO OOPHORECTOMY         Family History:   Family History   Problem Relation Age of Onset    Cervical cancer Mother     Heart attack Father     Heart disease Father     Breast cancer Sister     Heart disease Other     Diabetes Other     Cancer Other     Colon cancer Neg Hx        Social History:   Social History     Socioeconomic History    Marital status:    Tobacco Use    Smoking status: Never     Passive exposure: Never    Smokeless tobacco: Never   Vaping Use    Vaping Use: Never used   Substance and Sexual Activity    Alcohol use: No    Drug use: No    Sexual activity: Defer       Medications:     Current Outpatient Medications:     amiodarone (PACERONE) 200 MG tablet, Take 1 tablet by mouth Daily., Disp: 90 tablet, Rfl: 1    acetaminophen (TYLENOL) 500 MG tablet, Take 500 mg by mouth Every 6 (Six) Hours As Needed for Mild Pain ., Disp: , Rfl:     apixaban (ELIQUIS) 5 MG tablet tablet, Take 1 tablet by mouth Every 12 (Twelve) Hours., Disp: 60 tablet, Rfl: 0    atorvastatin (LIPITOR) 40 MG tablet, Take 40 mg by mouth Daily., Disp: , Rfl:     BIOTIN PO, Take 10,000 mcg by mouth Daily., Disp: , Rfl:     cetirizine (zyrTEC) 10 MG tablet, Take 10 mg by mouth Daily As Needed., Disp: , Rfl:     Cholecalciferol (VITAMIN D) 1000 units tablet, Take 1,000 Units by mouth Daily., Disp: , Rfl:     levothyroxine (SYNTHROID,  "LEVOTHROID) 75 MCG tablet, Take 75 mcg by mouth Daily., Disp: , Rfl:     lisinopril (PRINIVIL,ZESTRIL) 10 MG tablet, Take 10 mg by mouth Daily., Disp: , Rfl:     magnesium oxide (MAG-OX) 400 MG tablet, Take 1 tablet by mouth Daily., Disp: 30 tablet, Rfl: 0    PARoxetine (PAXIL) 20 MG tablet, Take 20 mg by mouth Every Morning., Disp: , Rfl:     sitaGLIPtin-metFORMIN (JANUMET)  MG per tablet, Take 1 tablet by mouth 2 (Two) Times a Day With Meals., Disp: , Rfl:     topiramate (TOPAMAX) 50 MG tablet, Take 1 tablet by mouth Every 12 (Twelve) Hours., Disp: 60 tablet, Rfl: 1    TRUE METRIX BLOOD GLUCOSE TEST test strip, TEST BLOOD 3 TIMES A DAY, Disp: , Rfl: 5    vitamin B-12 (CYANOCOBALAMIN) 1000 MCG tablet, Take 1,000 mcg by mouth Daily., Disp: , Rfl:     Allergies:   Allergies   Allergen Reactions    Ceftin [Cefuroxime Axetil] Confusion    Contrast Dye (Echo Or Unknown Ct/Mr) Rash       Physical Exam:  Vital Signs:   Vitals:    12/04/23 1017   BP: 126/58   BP Location: Right arm   Patient Position: Sitting   Pulse: 93   Resp: 18   SpO2: 98%   Weight: 68 kg (150 lb)   Height: 162.6 cm (64\")       Physical Exam  Constitutional:       General: She is not in acute distress.     Appearance: Normal appearance. She is well-developed. She is not diaphoretic.   HENT:      Head: Normocephalic and atraumatic.   Eyes:      General: No scleral icterus.     Pupils: Pupils are equal, round, and reactive to light.   Neck:      Trachea: No tracheal deviation.   Cardiovascular:      Rate and Rhythm: Normal rate and regular rhythm.      Heart sounds: Murmur heard.      No friction rub. No gallop.      Comments: 3/6 systolic murmur over the aortic area  Pulmonary:      Effort: Pulmonary effort is normal. No respiratory distress.      Breath sounds: Normal breath sounds. No stridor. No wheezing or rales.   Chest:      Chest wall: No tenderness.   Abdominal:      General: Bowel sounds are normal. There is no distension.      " Palpations: Abdomen is soft.      Tenderness: There is no abdominal tenderness. There is no guarding or rebound.   Musculoskeletal:         General: No swelling. Normal range of motion.      Cervical back: Neck supple. No tenderness.   Lymphadenopathy:      Cervical: No cervical adenopathy.   Skin:     General: Skin is warm and dry.      Findings: No erythema.   Neurological:      General: No focal deficit present.      Mental Status: She is alert and oriented to person, place, and time.   Psychiatric:         Mood and Affect: Mood normal.         Behavior: Behavior normal.         Results Review:   I reviewed the patient's new clinical results.  I personally viewed and interpreted the patient's EKG/Telemetry data      ECG 12 Lead    Date/Time: 12/4/2023 10:57 AM  Performed by: Tan Leavitt MD    Authorized by: Tan Leavitt MD  Comparison: not compared with previous ECG   Previous ECG: no previous ECG available  Rhythm: sinus rhythm  Rate: normal  QRS axis: normal    Clinical impression: normal ECG          Assessment / Plan:     1. Paroxysmal atrial fibrillation  -- Recently found to be in atrial fibrillation with rapid ventricular rates  -- ECG today shows NSR  -- Recurrent episodes of palpitations likely related to recurrent PAF/RVR  -- Will start oral amiodarone for rhythm control strategy  -- Continue Eliquis for CVA prophylaxis  -- Follow-up in 1 month to reassess symptoms following oral amiodarone loading    2. Aortic stenosis, severe  --History of TAVR in 2017  --3/6 systolic ejection murmur on exam today  -- Echocardiogram to reassess    3. Coronary artery disease  -- History of multivessel CAD with remote CABG  --No chest pain, Will potential etiologies for fatigue however potentially an anginal equivalent  --Echocardiogram to reassess aortic stenosis  --If fatigue persistent and no significant bioprosthetic aortic stenosis on exam, will consider MPS    4. Primary hypertension  -- BP adequately  controlled today    5. Pure hypercholesterolemia  -- Continue statin      Follow Up:   Return in about 4 weeks (around 1/1/2024) for With Dianne.      Thank you for allowing me to participate in the care of your patient. Please to not hesitate to contact me with additional questions or concerns.     CRUZIOT Leavitt MD  Interventional Cardiology   12/04/2023  10:23 EST

## 2023-12-13 ENCOUNTER — TELEPHONE (OUTPATIENT)
Dept: CARDIOLOGY | Facility: CLINIC | Age: 78
End: 2023-12-13
Payer: MEDICARE

## 2023-12-13 DIAGNOSIS — I48.0 PAROXYSMAL ATRIAL FIBRILLATION: Primary | ICD-10-CM

## 2023-12-13 PROCEDURE — 93000 ELECTROCARDIOGRAM COMPLETE: CPT | Performed by: INTERNAL MEDICINE

## 2023-12-13 NOTE — TELEPHONE ENCOUNTER
"Patient presented to the office today for her echo. Her daughter was with her and states that she has not been feeling to well the last couple of days. She was concerned it could be a side effect from starting the Amiodarone recently. States her hear has felt \"heavy\" and beating \"hard and fast\" when she stands up. States as long as she is sitting/laying down she is fine. Also wanted to know if she should be taking Eliquis and ASA 81 mg. States she has been taking it and it was on her list, but she did not see it on her AVS from her last visit here. I spoke with Dr. Leavitt and he reviewed her chart and states that she should be taking ASA 81 mg.  Dr. Leavitt advised for patient to have an EKG while in office today.  "

## 2023-12-22 ENCOUNTER — TELEPHONE (OUTPATIENT)
Dept: CARDIOLOGY | Facility: CLINIC | Age: 78
End: 2023-12-22
Payer: COMMERCIAL

## 2023-12-22 NOTE — TELEPHONE ENCOUNTER
Daughter states that patient amiodorone was called in wrong on 12/04. 90 day supply for 1 tab per day called. Patient only got a 30 day supply. Medication sig. Was 2 tabs the first 7 day, then 1 tab daily.  Rx corrected with pharmacy.

## 2024-04-08 ENCOUNTER — OFFICE VISIT (OUTPATIENT)
Dept: CARDIOLOGY | Facility: CLINIC | Age: 79
End: 2024-04-08
Payer: MEDICARE

## 2024-04-08 VITALS
BODY MASS INDEX: 24.07 KG/M2 | OXYGEN SATURATION: 96 % | WEIGHT: 141 LBS | RESPIRATION RATE: 20 BRPM | HEART RATE: 103 BPM | HEIGHT: 64 IN | SYSTOLIC BLOOD PRESSURE: 110 MMHG | DIASTOLIC BLOOD PRESSURE: 60 MMHG

## 2024-04-08 DIAGNOSIS — I25.10 CORONARY ARTERY DISEASE INVOLVING NATIVE CORONARY ARTERY OF NATIVE HEART WITHOUT ANGINA PECTORIS: ICD-10-CM

## 2024-04-08 DIAGNOSIS — I48.0 PAROXYSMAL ATRIAL FIBRILLATION: Primary | ICD-10-CM

## 2024-04-08 PROCEDURE — 99214 OFFICE O/P EST MOD 30 MIN: CPT | Performed by: INTERNAL MEDICINE

## 2024-04-08 NOTE — PROGRESS NOTES
"             [facility] Cardiology Office Follow Up Note    Nette Montalvo  [id number]  2024    Primary Care Provider: Carol Virgen APRN    Chief Complaint: Routine follow-up    History of Present Illness:   Mrs. Nette Montalvo is a 79 y.o. female who presents to the Cardiology Clinic for routine follow-up.  The patient has a past medical history significant for type 2 diabetes mellitus, hypothyroidism, hyperlipidemia, and hypertension.  She has a past cardiac history significant for multivessel coronary artery disease with remote CABG.  She also underwent TAVR due to severe aortic stenosis in 2017.  She also has a history of paroxysmal atrial fibrillation with RVR, for which she is on amiodarone for rhythm control strategy.  Since her last follow-up, the patient reports occasional episodes where her \"heart races.  She also reports episodes where she feels like her \"heart stops.\"  This is associated with dizziness and lightheadedness.  She also reports several near syncopal episodes.  Finally, she reports left-sided neck discomfort.  The discomfort radiates to her ear.  She also reports an occasional \"pain in her tongue\" associated with her neck pain.  Her neck discomfort is not any worse with any particular movement.  No other specific complaints today.     Past Cardiac Testin. Last Coronary Angio: Remote, records unavailable  2. Prior Stress Testing: None  3. Last Echo:   1.  Normal left ventricular size and systolic function, LVEF 50-55%.  2.  Mild concentric LVH.  3.  Normal LV diastolic filling pattern.  4.  Normal right ventricular size and systolic function.  5.  Severely increased left atrial volume index.  6.  Status post TAVR with adequate functioning valve (mean gradient 15 mmHg).  7.  Mild tricuspid regurgitation, RVSP 44 mmHg.  4. Prior Holter Monitor: None    Review of Systems:   Review of Systems   Constitutional:  Negative for activity change, appetite " change, chills, diaphoresis, fatigue, fever, unexpected weight gain and unexpected weight loss.   Eyes:  Negative for blurred vision and double vision.   Respiratory:  Negative for cough, chest tightness, shortness of breath and wheezing.    Cardiovascular:  Positive for palpitations. Negative for chest pain and leg swelling.   Gastrointestinal:  Negative for abdominal pain, anal bleeding, blood in stool and GERD.   Endocrine: Negative for cold intolerance and heat intolerance.   Genitourinary:  Negative for hematuria.   Musculoskeletal:         Left neck pain   Neurological:  Positive for dizziness and light-headedness. Negative for syncope and weakness.   Hematological:  Does not bruise/bleed easily.   Psychiatric/Behavioral:  Negative for depressed mood and stress. The patient is not nervous/anxious.        I have reviewed and/or updated the patient's past medical, past surgical, family, social history, problem list and allergies as appropriate.     Medications:     Current Outpatient Medications:     acetaminophen (TYLENOL) 500 MG tablet, Take 1 tablet by mouth Every 6 (Six) Hours As Needed for Mild Pain., Disp: , Rfl:     amiodarone (PACERONE) 200 MG tablet, Take 1 tablet by mouth Daily., Disp: 90 tablet, Rfl: 1    apixaban (ELIQUIS) 5 MG tablet tablet, Take 1 tablet by mouth Every 12 (Twelve) Hours., Disp: 60 tablet, Rfl: 0    atorvastatin (LIPITOR) 40 MG tablet, Take 1 tablet by mouth Daily., Disp: , Rfl:     BIOTIN PO, Take 10,000 mcg by mouth Daily., Disp: , Rfl:     cetirizine (zyrTEC) 10 MG tablet, Take 1 tablet by mouth Daily As Needed., Disp: , Rfl:     Cholecalciferol (VITAMIN D) 1000 units tablet, Take 1 tablet by mouth Daily., Disp: , Rfl:     levothyroxine (SYNTHROID, LEVOTHROID) 75 MCG tablet, Take 1 tablet by mouth Daily., Disp: , Rfl:     lisinopril (PRINIVIL,ZESTRIL) 10 MG tablet, Take 1 tablet by mouth Daily., Disp: , Rfl:     magnesium oxide (MAG-OX) 400 MG tablet, Take 1 tablet by mouth  "Daily., Disp: 30 tablet, Rfl: 0    PARoxetine (PAXIL) 20 MG tablet, Take 1 tablet by mouth Every Morning., Disp: , Rfl:     sitaGLIPtin-metFORMIN (JANUMET)  MG per tablet, Take 1 tablet by mouth 2 (Two) Times a Day With Meals., Disp: , Rfl:     topiramate (TOPAMAX) 50 MG tablet, Take 1 tablet by mouth Every 12 (Twelve) Hours., Disp: 60 tablet, Rfl: 1    TRUE METRIX BLOOD GLUCOSE TEST test strip, TEST BLOOD 3 TIMES A DAY, Disp: , Rfl: 5    vitamin B-12 (CYANOCOBALAMIN) 1000 MCG tablet, Take 1 tablet by mouth Daily., Disp: , Rfl:     Physical Exam:  Vital Signs:   Vitals:    04/08/24 1512   BP: 110/60   BP Location: Right arm   Patient Position: Sitting   Pulse: 103   Resp: 20   SpO2: 96%   Weight: 64 kg (141 lb)   Height: 162.6 cm (64\")       Physical Exam  Constitutional:       General: She is not in acute distress.     Appearance: Normal appearance. She is well-developed. She is not diaphoretic.   HENT:      Head: Normocephalic and atraumatic.   Eyes:      General: No scleral icterus.     Pupils: Pupils are equal, round, and reactive to light.   Neck:      Trachea: No tracheal deviation.   Cardiovascular:      Rate and Rhythm: Normal rate and regular rhythm.      Heart sounds: Normal heart sounds.      No friction rub. No gallop.      Comments: Soft systolic murmur.  Normal JVD.  Pulmonary:      Effort: Pulmonary effort is normal. No respiratory distress.      Breath sounds: Normal breath sounds. No stridor. No wheezing or rales.   Chest:      Chest wall: No tenderness.   Abdominal:      General: Bowel sounds are normal. There is no distension.      Palpations: Abdomen is soft.      Tenderness: There is no abdominal tenderness. There is no guarding or rebound.   Musculoskeletal:         General: No swelling. Normal range of motion.      Cervical back: Neck supple. No tenderness.   Lymphadenopathy:      Cervical: No cervical adenopathy.   Skin:     General: Skin is warm and dry.      Findings: No erythema. "   Neurological:      General: No focal deficit present.      Mental Status: She is alert and oriented to person, place, and time.   Psychiatric:         Mood and Affect: Mood normal.         Behavior: Behavior normal.         Results Review:   I reviewed the patient's new clinical results.        Assessment / Plan:     1. Paroxysmal atrial fibrillation  -- History of PAF with RVR  -- Appears to be in normal sinus rhythm today  -- Given recent episodes of palpitations, as well as dizziness/lightheadedness and near syncope, will proceed with outpatient cardiac monitor to reassess for recurrent PAF, tachybrady syndrome  -- Continue oral amiodarone for now for rhythm control  -- Continue Eliquis for CVA prophylaxis  -- Follow-up in 1 month, sooner if required     2. Aortic stenosis, severe  --History of TAVR in 2017  --3/6 systolic ejection murmur on exam today  --Repeat echocardiogram shows well-functioning valve     3. Coronary artery disease  -- History of multivessel CAD with remote CABG  --No chest pain, however fatigue potentially an anginal equivalent  --Will proceed with pharmacologic MPS     4. Primary hypertension  -- BP adequately controlled today     5. Pure hypercholesterolemia  -- Continue statin    Follow Up:   Return in about 4 weeks (around 5/6/2024).      Thank you for allowing me to participate in the care of your patient. Please to not hesitate to contact me with additional questions or concerns.     CRUZITO Leavitt MD  Interventional Cardiology   04/08/2024  15:16 EDT

## 2024-05-09 ENCOUNTER — OFFICE VISIT (OUTPATIENT)
Dept: CARDIOLOGY | Facility: CLINIC | Age: 79
End: 2024-05-09
Payer: MEDICARE

## 2024-05-09 VITALS
HEIGHT: 64 IN | OXYGEN SATURATION: 94 % | SYSTOLIC BLOOD PRESSURE: 120 MMHG | WEIGHT: 147 LBS | DIASTOLIC BLOOD PRESSURE: 62 MMHG | BODY MASS INDEX: 25.1 KG/M2 | HEART RATE: 106 BPM

## 2024-05-09 DIAGNOSIS — I35.0 AORTIC STENOSIS, SEVERE: ICD-10-CM

## 2024-05-09 DIAGNOSIS — I25.10 CORONARY ARTERY DISEASE INVOLVING NATIVE CORONARY ARTERY OF NATIVE HEART WITHOUT ANGINA PECTORIS: ICD-10-CM

## 2024-05-09 DIAGNOSIS — I10 PRIMARY HYPERTENSION: ICD-10-CM

## 2024-05-09 DIAGNOSIS — E78.00 PURE HYPERCHOLESTEROLEMIA: ICD-10-CM

## 2024-05-09 DIAGNOSIS — I48.19 ATRIAL FIBRILLATION, PERSISTENT: Primary | ICD-10-CM

## 2024-05-09 PROCEDURE — 99214 OFFICE O/P EST MOD 30 MIN: CPT | Performed by: INTERNAL MEDICINE

## 2024-05-09 RX ORDER — DILTIAZEM HYDROCHLORIDE 120 MG/1
120 CAPSULE, EXTENDED RELEASE ORAL DAILY
Qty: 90 CAPSULE | Refills: 3 | Status: SHIPPED | OUTPATIENT
Start: 2024-05-09

## 2024-05-09 NOTE — PROGRESS NOTES
"             [facility] Cardiology Office Follow Up Note    Nette Montalvo  [id number]  2024    Primary Care Provider: Carol Virgen APRN    Chief Complaint: Routine follow-up    History of Present Illness:   Mrs. Nette Montalvo is a 78 y.o. female who presents to the Cardiology Clinic for routine follow-up.  The patient has a past medical history significant for type 2 diabetes mellitus, hypothyroidism, hyperlipidemia, and hypertension.  She has a past cardiac history significant for multivessel coronary artery disease with remote CABG.  She also underwent TAVR due to severe aortic stenosis in 2017.  He also has a history of persistent atrial fibrillation.  Since her last appointment, the patient reports frequent episodes of a \"irregular and racing\" heart rate.  She has been compliant with her amiodarone.  She has been tolerating Eliquis without significant bleeding or bruising.  No other specific complaints today.     Past Cardiac Testin. Last Coronary Angio: Remote, records unavailable  2. Prior Stress Testing: None  3. Last Echo:   1.  Normal left ventricular size and systolic function, LVEF 50-55%.  2.  Mild concentric LVH.  3.  Normal LV diastolic filling pattern.  4.  Normal right ventricular size and systolic function.  5.  Severely increased left atrial volume index.  6.  Status post TAVR with adequate functioning valve (mean gradient 15 mmHg).  7.  Mild tricuspid regurgitation, RVSP 44 mmHg.  4. Prior Holter Monitor: 14-day Holter   1.  The predominant rhythm is atrial fibrillation with a 100% burden.  Average heart rate 103 bpm, max 148 bpm.  2.  12 symptomatic events primarily corresponded to atrial fibrillation with mild RVR.    Review of Systems:   Review of Systems   Constitutional:  Negative for activity change, appetite change, chills, diaphoresis, fatigue, fever, unexpected weight gain and unexpected weight loss.   Eyes:  Negative for blurred vision and " double vision.   Respiratory:  Negative for cough, chest tightness, shortness of breath and wheezing.    Cardiovascular:  Positive for palpitations. Negative for chest pain and leg swelling.   Gastrointestinal:  Negative for abdominal pain, anal bleeding, blood in stool and GERD.   Endocrine: Negative for cold intolerance and heat intolerance.   Genitourinary:  Negative for hematuria.   Neurological:  Negative for dizziness, syncope, weakness and light-headedness.   Hematological:  Does not bruise/bleed easily.   Psychiatric/Behavioral:  Negative for depressed mood and stress. The patient is not nervous/anxious.        I have reviewed and/or updated the patient's past medical, past surgical, family, social history, problem list and allergies as appropriate.     Medications:     Current Outpatient Medications:     acetaminophen (TYLENOL) 500 MG tablet, Take 1 tablet by mouth Every 6 (Six) Hours As Needed for Mild Pain., Disp: , Rfl:     apixaban (ELIQUIS) 5 MG tablet tablet, Take 1 tablet by mouth Every 12 (Twelve) Hours., Disp: 60 tablet, Rfl: 0    atorvastatin (LIPITOR) 40 MG tablet, Take 1 tablet by mouth Daily., Disp: , Rfl:     BIOTIN PO, Take 10,000 mcg by mouth Daily., Disp: , Rfl:     cetirizine (zyrTEC) 10 MG tablet, Take 1 tablet by mouth Daily As Needed., Disp: , Rfl:     Cholecalciferol (VITAMIN D) 1000 units tablet, Take 1 tablet by mouth Daily., Disp: , Rfl:     levothyroxine (SYNTHROID, LEVOTHROID) 75 MCG tablet, Take 1 tablet by mouth Daily., Disp: , Rfl:     lisinopril (PRINIVIL,ZESTRIL) 10 MG tablet, Take 1 tablet by mouth Daily., Disp: , Rfl:     magnesium oxide (MAG-OX) 400 MG tablet, Take 1 tablet by mouth Daily., Disp: 30 tablet, Rfl: 0    PARoxetine (PAXIL) 20 MG tablet, Take 1 tablet by mouth Every Morning., Disp: , Rfl:     sitaGLIPtin-metFORMIN (JANUMET)  MG per tablet, Take 1 tablet by mouth 2 (Two) Times a Day With Meals., Disp: , Rfl:     topiramate (TOPAMAX) 50 MG tablet, Take 1  "tablet by mouth Every 12 (Twelve) Hours., Disp: 60 tablet, Rfl: 1    TRUE METRIX BLOOD GLUCOSE TEST test strip, TEST BLOOD 3 TIMES A DAY, Disp: , Rfl: 5    vitamin B-12 (CYANOCOBALAMIN) 1000 MCG tablet, Take 1 tablet by mouth Daily., Disp: , Rfl:     dilTIAZem XR (DILACOR XR) 120 MG 24 hr capsule, Take 1 capsule by mouth Daily., Disp: 90 capsule, Rfl: 3    Physical Exam:  Vital Signs:   Vitals:    05/09/24 1450   BP: 120/62   BP Location: Right arm   Patient Position: Sitting   Cuff Size: Adult   Pulse: 106   SpO2: 94%   Weight: 66.7 kg (147 lb)   Height: 162.6 cm (64\")       Physical Exam  Vitals and nursing note reviewed.   Constitutional:       General: She is not in acute distress.     Appearance: She is well-developed. She is not diaphoretic.   HENT:      Head: Normocephalic and atraumatic.   Eyes:      General: No scleral icterus.     Pupils: Pupils are equal, round, and reactive to light.   Neck:      Trachea: No tracheal deviation.   Cardiovascular:      Rate and Rhythm: Tachycardia present. Rhythm irregular.      Heart sounds: Normal heart sounds. No murmur heard.     No friction rub. No gallop.      Comments: Normal JVD.  Pulmonary:      Effort: Pulmonary effort is normal. No respiratory distress.      Breath sounds: Normal breath sounds. No stridor. No wheezing or rales.   Chest:      Chest wall: No tenderness.   Abdominal:      General: Bowel sounds are normal. There is no distension.      Palpations: Abdomen is soft.      Tenderness: There is no abdominal tenderness. There is no guarding or rebound.   Musculoskeletal:         General: Normal range of motion.      Cervical back: Neck supple. No tenderness.   Lymphadenopathy:      Cervical: No cervical adenopathy.   Skin:     General: Skin is warm and dry.      Findings: No erythema.   Neurological:      Mental Status: She is alert and oriented to person, place, and time.   Psychiatric:         Behavior: Behavior normal.         Results Review:   I " reviewed the patient's new clinical results.        Assessment / Plan:     1.  Persistent atrial fibrillation  -- Outpatient cardiac monitoring showed 100% AF burden with RVR  --Given the patient has had recurrent A-fib with RVR despite amiodarone, will discontinue  --Start diltiazem for rate control  --Continue Eliquis for CVA prophylaxis  --Referral to electrophysiology     2. Aortic stenosis, severe  --History of TAVR in 2017  -- Last echocardiogram showed well-functioning valve     3. Coronary artery disease  -- History of multivessel CAD with remote CABG  -- No chest pain, cannot rule out fatigue being an anginal equivalent  --MPS pending     4. Primary hypertension  -- BP adequately controlled today     5. Pure hypercholesterolemia  -- Continue statin    Follow Up:   Return in about 3 months (around 8/9/2024).      Thank you for allowing me to participate in the care of your patient. Please to not hesitate to contact me with additional questions or concerns.     CRUZITO Leavitt MD  Interventional Cardiology   05/09/2024  15:08 EDT

## 2024-06-04 PROBLEM — I48.19 ATRIAL FIBRILLATION, PERSISTENT: Status: ACTIVE | Noted: 2024-06-04

## 2024-06-04 PROBLEM — I10 ESSENTIAL HYPERTENSION: Status: ACTIVE | Noted: 2024-06-04

## 2024-06-04 PROBLEM — I25.10 CAD (CORONARY ARTERY DISEASE), NATIVE CORONARY ARTERY: Status: ACTIVE | Noted: 2024-06-04

## 2024-06-04 NOTE — PROGRESS NOTES
Electrophysiology Clinic Consult     Nette Montalvo  [id number]  1945    Referring Provider: Tan Leavitt MD   PCP: Carol Virgen APRN  [address] / [place] [address]    Date of Service: 06/06/24    Chief Complaint   Patient presents with    Atrial Fibrillation     6-8 Mo F/U     Problem List  Persistent atrial fibrillation  [id number] 5, anticoagulated Eliquis  AAD: Failed amio  Echo, 12/2023: EF 50-55%, mild concentric LVH  Holter, 4/2024: 100% A-fib burden  Coronary artery disease  S/p remote CABG  Severe aortic stenosis  S/p TAVR, 2017  Hypertension  Hyperlipidemia  Type 2 diabetes  Hypothyroid      History of Present Illness  Nette Montalvo is a 78 y.o. female who presents to my electrophysiology clinic for evaluation of persistent atrial fibrillation.  It appears that patient has been on amiodarone in the past without successful conversion to sinus rhythm.  Recently wore a Holter monitor which showed 100% A-fib burden.  It appears that patient is in atrial fibrillation currently in my office versus possible atypical atrial flutter.  Patient endorses that she is tired all the time and has been hospitalized couple times for A-fib with RVR.  Referred to my office for further evaluation.  Recently has been taken off of amiodarone due to not being effective and started on rate control regimen.    Review of Systems   Constitutional:  Positive for fatigue. Negative for activity change and fever.   Respiratory:  Positive for shortness of breath. Negative for chest tightness.    Cardiovascular:  Positive for palpitations. Negative for chest pain and leg swelling.   Gastrointestinal:  Negative for constipation and diarrhea.   Genitourinary:  Negative for decreased urine volume and difficulty urinating.   Skin:  Negative for wound.   Neurological:  Positive for weakness. Negative for dizziness, syncope and light-headedness.   Psychiatric/Behavioral:  Negative for suicidal ideas.        Outpatient  "Medications Marked as Taking for the 6/6/24 encounter (Office Visit) with Fran Powell MD   Medication Sig Dispense Refill    acetaminophen (TYLENOL) 500 MG tablet Take 1 tablet by mouth Every 6 (Six) Hours As Needed for Mild Pain.      apixaban (ELIQUIS) 5 MG tablet tablet Take 1 tablet by mouth Every 12 (Twelve) Hours. 60 tablet 0    aspirin 81 MG EC tablet Take 1 tablet by mouth Daily.      atorvastatin (LIPITOR) 40 MG tablet Take 1 tablet by mouth Daily.      BIOTIN PO Take 10,000 mcg by mouth Daily.      cetirizine (zyrTEC) 10 MG tablet Take 1 tablet by mouth Daily As Needed.      Cholecalciferol (VITAMIN D) 1000 units tablet Take 1 tablet by mouth Daily.      dilTIAZem XR (DILACOR XR) 120 MG 24 hr capsule Take 1 capsule by mouth Daily. 90 capsule 3    levothyroxine (SYNTHROID, LEVOTHROID) 75 MCG tablet Take 1 tablet by mouth Daily.      lisinopril (PRINIVIL,ZESTRIL) 10 MG tablet Take 1 tablet by mouth Daily.      magnesium oxide (MAG-OX) 400 MG tablet Take 1 tablet by mouth Daily. 30 tablet 0    nystatin (MYCOSTATIN) 100,000 unit/mL suspension 1 mL. 1 teaspoon, 5mg      PARoxetine (PAXIL) 20 MG tablet Take 1 tablet by mouth Every Morning.      sitaGLIPtin-metFORMIN (JANUMET)  MG per tablet Take 1 tablet by mouth 2 (Two) Times a Day With Meals.      topiramate (TOPAMAX) 50 MG tablet Take 1 tablet by mouth Every 12 (Twelve) Hours. 60 tablet 1    TRUE METRIX BLOOD GLUCOSE TEST test strip TEST BLOOD 3 TIMES A DAY  5    vitamin B-12 (CYANOCOBALAMIN) 1000 MCG tablet Take 1 tablet by mouth Daily.         Physical Exam  Vitals:    06/06/24 0909   BP: 136/70   BP Location: Right arm   Patient Position: Sitting   Cuff Size: Adult   Pulse: 114   SpO2: 97%   Weight: 67.1 kg (148 lb)   Height: 162.6 cm (64.02\")     GENERAL: Well-developed, well-nourished patient in no acute distress.  HEENT: NC, AC, PERRLA. MMM  NECK: No JVD. No carotid bruits auscultated.  LUNGS: Clear to auscultation bilaterally.  CARDIOVASCULAR: " IRIR No murmurs, gallops or rubs noted.   ABDOMEN: Soft, nontender. Positive bowel sounds.  MUSCULOSKELETAL: No gross deformities. No clubbing, cyanosis  EXT: pulses intact, No edema  SKIN: Pink, warm  Neuro: Nonfocal exam. Gait intact    Diagnostic Data  Procedures    Lab Results   Component Value Date    GLUCOSE 146 (H) 09/09/2023    CALCIUM 9.0 09/09/2023     09/09/2023    K 4.3 09/09/2023    CO2 21.1 (L) 09/09/2023     09/09/2023    BUN 15 09/09/2023    CREATININE 0.88 09/09/2023    EGFRIFNONA 82 03/24/2017    BCR 17.0 09/09/2023    ANIONGAP 13.9 09/09/2023     Lab Results   Component Value Date    WBC 7.97 09/09/2023    HGB 9.9 (L) 09/09/2023    HCT 33.8 (L) 09/09/2023    MCV 81.1 09/09/2023     09/09/2023     Lab Results   Component Value Date    INR 1.0 03/24/2017    PROTIME 11.5 (L) 03/24/2017     Lab Results   Component Value Date    TSH 3.917 03/25/2017         I personally viewed and interpreted the patient's EKG/Telemetry/lab data      Assessment and Plan   Diagnoses and all orders for this visit:    1. Atrial fibrillation, persistent (Primary)    2. Coronary artery disease involving native coronary artery of native heart without angina pectoris    3. Essential hypertension        Persistent atrial fibrillation  -[id number] 5, anticoagulated Eliquis  -Holter, 4/2024: 100% A-fib burden  -Amiodarone failed. Started on diltiazem for rate control  -Discussed options of A-fib ablation versus AV node ablation along with pacemaker implantation  -Would like to think about her options and get back to us   -Would recommend leadless pacemaker implantation (AVEIR) along with AV node ablation if she is agreeable in the future   - in the meantime, increase cardizem to 180mg po qday for better rate control    Coronary artery disease  -S/p remote CABG  -continue statin  -Follows with Dr. Leavitt    Hypertension  -Well controlled, continue current medications      Body mass index is 25.39  kg/m².    Follow Up  Return in about 3 months (around 9/6/2024) for Follow up after Procedure.  ACP discussion was declined by the patient. Patient has an advance directive (not in EMR), copy requested.    Thank you for allowing me to participate in the care of your patient. Please to not hesitate to contact me with additional questions or concerns.        Fran Powell MD [facility]  Cardiac Electrophysiologist  [facility] / [facility]

## 2024-06-06 ENCOUNTER — OFFICE VISIT (OUTPATIENT)
Dept: CARDIOLOGY | Facility: CLINIC | Age: 79
End: 2024-06-06
Payer: MEDICARE

## 2024-06-06 ENCOUNTER — PATIENT ROUNDING (BHMG ONLY) (OUTPATIENT)
Dept: CARDIOLOGY | Facility: CLINIC | Age: 79
End: 2024-06-06
Payer: COMMERCIAL

## 2024-06-06 ENCOUNTER — TELEPHONE (OUTPATIENT)
Dept: CARDIOLOGY | Facility: CLINIC | Age: 79
End: 2024-06-06

## 2024-06-06 VITALS
DIASTOLIC BLOOD PRESSURE: 70 MMHG | HEIGHT: 64 IN | HEART RATE: 114 BPM | SYSTOLIC BLOOD PRESSURE: 136 MMHG | WEIGHT: 148 LBS | OXYGEN SATURATION: 97 % | BODY MASS INDEX: 25.27 KG/M2

## 2024-06-06 DIAGNOSIS — I10 ESSENTIAL HYPERTENSION: Chronic | ICD-10-CM

## 2024-06-06 DIAGNOSIS — I48.19 ATRIAL FIBRILLATION, PERSISTENT: Primary | Chronic | ICD-10-CM

## 2024-06-06 DIAGNOSIS — I25.10 CORONARY ARTERY DISEASE INVOLVING NATIVE CORONARY ARTERY OF NATIVE HEART WITHOUT ANGINA PECTORIS: Chronic | ICD-10-CM

## 2024-06-06 RX ORDER — ASPIRIN 81 MG/1
81 TABLET ORAL DAILY
COMMUNITY

## 2024-06-06 RX ORDER — DILTIAZEM HYDROCHLORIDE 180 MG/1
180 CAPSULE, COATED, EXTENDED RELEASE ORAL DAILY
Qty: 90 CAPSULE | Refills: 0 | Status: SHIPPED | OUTPATIENT
Start: 2024-06-06

## 2024-06-06 NOTE — PROGRESS NOTES
June 6, 2024    Hello, may I speak with Nette Montalvo? Yes.    My name is Katharina ALEMAN      I am  with [facility] CARDIOLOGY  [address]  [address]  [place] [facility] [address] [phone].    Before we get started may I verify your date of birth? 1945, Yes, correct.    I am calling to officially welcome you to our practice and ask about your recent visit. Is this a good time to talk? yes    Tell me about your visit with us. What things went well?  Doctor was very nice.         We're always looking for ways to make our patients' experiences even better. Do you have recommendations on ways we may improve?  no    Overall were you satisfied with your first visit to our practice? yes       I appreciate you taking the time to speak with me today. Is there anything else I can do for you? no      Thank you, and have a great day.

## 2024-06-06 NOTE — TELEPHONE ENCOUNTER
Called patient's daughter that the ppm and av node ablation could be done together and that Dr. Leavitt ordered the stress test and that the patient needs to discuss with Dr. Leavitt if the stress test is still needed. Patient's daughter verbalized understanding and will call us back with the decision on whether they would like to proceed with with the AV node ablation and pacemaker insertion.

## 2024-06-06 NOTE — TELEPHONE ENCOUNTER
Caller: Nette Montalvo    Relationship: Self    Best call back number: [phone] OR AFTER [phone]:30 [phone]    What is the best time to reach you: ANYTIME     What was the call regarding: PATIENT DAUGHTER CALLED N WITH A QUESTION ABOUT SOMETHING THAT WAS DISCUSSED AT HER APPOINTMENT WITH DR. GAVIN. PATIENT HAS A STRESS TEST SCHEDULED FOR 06/19/24 AND STATED THAT DR. GAVIN MENTIONED THAT HE COULD DO THE PACEMAKER AT THE SAME TIME AS THE STRESS TEST THEY BELIEVE. PATIENT WOULD JUST LIKE TO KNOW WHAT DR. GAVIN MEANT BY THAT.     Is it okay if the provider responds through MyChart: PREFERS A CALL

## 2024-07-24 ENCOUNTER — TELEPHONE (OUTPATIENT)
Dept: CARDIOLOGY | Facility: CLINIC | Age: 79
End: 2024-07-24
Payer: COMMERCIAL

## 2024-07-24 NOTE — TELEPHONE ENCOUNTER
Caller: Nette Montalvo    Relationship: Self    Best call back number: [phone]    Which medication are you concerned about: DILTIAZEM 180 MG    Who prescribed you this medication: DR GAVIN    When did you start taking this medication: INCREASED DOSAGE ON 6-6-24    What are your concerns: PATIENT BELIEVES IT IS MAKING HER HAIR FALL OUT AND CAUSING SWELLING IN HER LOWER EXTREMITIES.    How long have you had these concerns: SINCE JUNE.    PATIENT ALSO IS INQUIRING IF SHE WOULD NEED TO CONTINUE TAKING THIS MEDICATION IS SHE GOT A PACEMAKER.

## 2024-07-24 NOTE — TELEPHONE ENCOUNTER
Called to follow up with patient. Spoke w/ daughter Samaria. She states that since the increase in Diltiazem the patients hair is falling out in clumps and her legs are swelling. Current HR is 106 and /73. Patient is still undecided on AV node ablation and PPM, but wanted to know if she could come off Diltiazem if she had it done. Instructed daughter that she would likely be able to come off the medication as it is being used to control her rate w/ afib. However I did let them know it may be helping control her BP as well and medications may need to be adjusted. They would like to know if there is any other medication they can try until a decision is made.

## 2024-07-29 RX ORDER — METOPROLOL SUCCINATE 50 MG/1
50 TABLET, EXTENDED RELEASE ORAL DAILY
Qty: 30 TABLET | Refills: 6 | Status: SHIPPED | OUTPATIENT
Start: 2024-07-29

## 2024-08-07 ENCOUNTER — TELEPHONE (OUTPATIENT)
Dept: CARDIOLOGY | Facility: CLINIC | Age: 79
End: 2024-08-07
Payer: COMMERCIAL

## 2024-08-07 NOTE — TELEPHONE ENCOUNTER
Caller: Samaria Tinajero    Relationship: Emergency Contact    Best call back number: [phone] IF NO ANSWER CALL PT HOME NUMBER    What is the best time to reach you: ANYTIME    Who are you requesting to speak with (clinical staff, provider,  specific staff member): CLINICAL    Do you know the name of the person who called: N/A    What was the call regarding: PATIENT'S DAUGHTER, SAMARIA TINAJERO CALLED IN WANTING TO KNOW IF PATIENT NEEDS TO KEEP 3 MONTH FOLLOW UP WITH MOHAMUD BOLANOS MD ON 8-14-24. PATIENT HAS NOT DECIDED RATHER OR NOT TO HAVE PACE MAKER PUT IN. DR. GAVIN HAS MEDICATIONS, BUT HAS NOT PUT PACE MAKER IN. PLEASE REACH OUT TO SAMARIA TINAJERO, PATIENTS DAUGHTER TO DISCUSS.    Is it okay if the provider responds through MyChart: CALL

## 2024-08-08 NOTE — TELEPHONE ENCOUNTER
"Spoke with daughter and she states that the patient is still experiencing weakness with frequent\"feels like her heart stops and restarts\". She denies any other symptoms. Daughter stated that Dr. Powell advised them to call his office when she is ready to move forward with the pacemaker. Ms. Montalvo has opted to try medication to manage at this time. She was placed on Toprol 50 mg by that office. Her daughter would like to know if she still needs to be seen on 8/14/24 due to the ongoing symptoms.   "

## 2024-09-21 ENCOUNTER — APPOINTMENT (OUTPATIENT)
Dept: CT IMAGING | Facility: HOSPITAL | Age: 79
DRG: 393 | End: 2024-09-21
Payer: MEDICARE

## 2024-09-21 ENCOUNTER — HOSPITAL ENCOUNTER (INPATIENT)
Facility: HOSPITAL | Age: 79
LOS: 1 days | Discharge: HOME | DRG: 393 | End: 2024-09-23
Attending: EMERGENCY MEDICINE | Admitting: STUDENT IN AN ORGANIZED HEALTH CARE EDUCATION/TRAINING PROGRAM
Payer: MEDICARE

## 2024-09-21 ENCOUNTER — APPOINTMENT (OUTPATIENT)
Dept: ULTRASOUND IMAGING | Facility: HOSPITAL | Age: 79
DRG: 393 | End: 2024-09-21
Payer: MEDICARE

## 2024-09-21 DIAGNOSIS — D64.9 SYMPTOMATIC ANEMIA: ICD-10-CM

## 2024-09-21 DIAGNOSIS — D62 ACUTE BLOOD LOSS ANEMIA: ICD-10-CM

## 2024-09-21 DIAGNOSIS — K92.2 ACUTE GI BLEEDING: Primary | ICD-10-CM

## 2024-09-21 LAB
ABO GROUP BLD: NORMAL
ABO GROUP BLD: NORMAL
ALBUMIN SERPL-MCNC: 3.8 G/DL (ref 3.5–5.2)
ALBUMIN/GLOB SERPL: 1.6 G/DL
ALP SERPL-CCNC: 86 U/L (ref 39–117)
ALT SERPL W P-5'-P-CCNC: 9 U/L (ref 1–33)
ANION GAP SERPL CALCULATED.3IONS-SCNC: 12 MMOL/L (ref 5–15)
ANTI-FYA: NORMAL
APTT PPP: 37.1 SECONDS (ref 60–90)
AST SERPL-CCNC: 21 U/L (ref 1–32)
BASOPHILS # BLD AUTO: 0.04 10*3/MM3 (ref 0–0.2)
BASOPHILS NFR BLD AUTO: 0.6 % (ref 0–1.5)
BILIRUB SERPL-MCNC: 0.4 MG/DL (ref 0–1.2)
BLD GP AB SCN SERPL QL: POSITIVE
BUN SERPL-MCNC: 17 MG/DL (ref 8–23)
BUN/CREAT SERPL: 14.3 (ref 7–25)
CALCIUM SPEC-SCNC: 9.1 MG/DL (ref 8.6–10.5)
CHLORIDE SERPL-SCNC: 107 MMOL/L (ref 98–107)
CO2 SERPL-SCNC: 24 MMOL/L (ref 22–29)
CREAT SERPL-MCNC: 1.19 MG/DL (ref 0.57–1)
DEPRECATED RDW RBC AUTO: 50 FL (ref 37–54)
DEVELOPER EXPIRATION DATE: ABNORMAL
DEVELOPER LOT NUMBER: ABNORMAL
EGFRCR SERPLBLD CKD-EPI 2021: 46 ML/MIN/1.73
EOSINOPHIL # BLD AUTO: 0.11 10*3/MM3 (ref 0–0.4)
EOSINOPHIL NFR BLD AUTO: 1.6 % (ref 0.3–6.2)
ERYTHROCYTE [DISTWIDTH] IN BLOOD BY AUTOMATED COUNT: 17.1 % (ref 12.3–15.4)
EXPIRATION DATE: ABNORMAL
FECAL OCCULT BLOOD SCREEN, POC: POSITIVE
GLOBULIN UR ELPH-MCNC: 2.4 GM/DL
GLUCOSE SERPL-MCNC: 110 MG/DL (ref 65–99)
HCT VFR BLD AUTO: 27.2 % (ref 34–46.6)
HGB BLD-MCNC: 7.5 G/DL (ref 12–15.9)
IMM GRANULOCYTES # BLD AUTO: 0.03 10*3/MM3 (ref 0–0.05)
IMM GRANULOCYTES NFR BLD AUTO: 0.4 % (ref 0–0.5)
INR PPP: 1.26 (ref 0.89–1.12)
LYMPHOCYTES # BLD AUTO: 1.42 10*3/MM3 (ref 0.7–3.1)
LYMPHOCYTES NFR BLD AUTO: 20.6 % (ref 19.6–45.3)
Lab: ABNORMAL
MCH RBC QN AUTO: 22.3 PG (ref 26.6–33)
MCHC RBC AUTO-ENTMCNC: 27.6 G/DL (ref 31.5–35.7)
MCV RBC AUTO: 80.7 FL (ref 79–97)
MONOCYTES # BLD AUTO: 0.42 10*3/MM3 (ref 0.1–0.9)
MONOCYTES NFR BLD AUTO: 6.1 % (ref 5–12)
NEGATIVE CONTROL: NEGATIVE
NEUTROPHILS NFR BLD AUTO: 4.87 10*3/MM3 (ref 1.7–7)
NEUTROPHILS NFR BLD AUTO: 70.7 % (ref 42.7–76)
NRBC BLD AUTO-RTO: 0 /100 WBC (ref 0–0.2)
PLATELET # BLD AUTO: 194 10*3/MM3 (ref 140–450)
PMV BLD AUTO: 12 FL (ref 6–12)
POSITIVE CONTROL: POSITIVE
POTASSIUM SERPL-SCNC: 3.9 MMOL/L (ref 3.5–5.2)
PROT SERPL-MCNC: 6.2 G/DL (ref 6–8.5)
PROTHROMBIN TIME: 15.9 SECONDS (ref 12.2–14.5)
RBC # BLD AUTO: 3.37 10*6/MM3 (ref 3.77–5.28)
RH BLD: POSITIVE
RH BLD: POSITIVE
SODIUM SERPL-SCNC: 143 MMOL/L (ref 136–145)
T&S EXPIRATION DATE: NORMAL
WBC NRBC COR # BLD AUTO: 6.89 10*3/MM3 (ref 3.4–10.8)

## 2024-09-21 PROCEDURE — 86920 COMPATIBILITY TEST SPIN: CPT

## 2024-09-21 PROCEDURE — G0378 HOSPITAL OBSERVATION PER HR: HCPCS

## 2024-09-21 PROCEDURE — 85025 COMPLETE CBC W/AUTO DIFF WBC: CPT | Performed by: EMERGENCY MEDICINE

## 2024-09-21 PROCEDURE — 86900 BLOOD TYPING SEROLOGIC ABO: CPT

## 2024-09-21 PROCEDURE — 86900 BLOOD TYPING SEROLOGIC ABO: CPT | Performed by: EMERGENCY MEDICINE

## 2024-09-21 PROCEDURE — 86922 COMPATIBILITY TEST ANTIGLOB: CPT

## 2024-09-21 PROCEDURE — 99222 1ST HOSP IP/OBS MODERATE 55: CPT | Performed by: NURSE PRACTITIONER

## 2024-09-21 PROCEDURE — 86850 RBC ANTIBODY SCREEN: CPT | Performed by: EMERGENCY MEDICINE

## 2024-09-21 PROCEDURE — 86905 BLOOD TYPING RBC ANTIGENS: CPT

## 2024-09-21 PROCEDURE — P9016 RBC LEUKOCYTES REDUCED: HCPCS

## 2024-09-21 PROCEDURE — 86901 BLOOD TYPING SEROLOGIC RH(D): CPT

## 2024-09-21 PROCEDURE — 86870 RBC ANTIBODY IDENTIFICATION: CPT | Performed by: EMERGENCY MEDICINE

## 2024-09-21 PROCEDURE — 36430 TRANSFUSION BLD/BLD COMPNT: CPT

## 2024-09-21 PROCEDURE — 74176 CT ABD & PELVIS W/O CONTRAST: CPT

## 2024-09-21 PROCEDURE — 85730 THROMBOPLASTIN TIME PARTIAL: CPT | Performed by: EMERGENCY MEDICINE

## 2024-09-21 PROCEDURE — 85610 PROTHROMBIN TIME: CPT | Performed by: EMERGENCY MEDICINE

## 2024-09-21 PROCEDURE — 80053 COMPREHEN METABOLIC PANEL: CPT | Performed by: EMERGENCY MEDICINE

## 2024-09-21 PROCEDURE — 86901 BLOOD TYPING SEROLOGIC RH(D): CPT | Performed by: EMERGENCY MEDICINE

## 2024-09-21 PROCEDURE — 82270 OCCULT BLOOD FECES: CPT | Performed by: EMERGENCY MEDICINE

## 2024-09-21 PROCEDURE — 25010000002 FUROSEMIDE PER 20 MG: Performed by: NURSE PRACTITIONER

## 2024-09-21 PROCEDURE — 86902 BLOOD TYPE ANTIGEN DONOR EA: CPT

## 2024-09-21 PROCEDURE — 99285 EMERGENCY DEPT VISIT HI MDM: CPT

## 2024-09-21 PROCEDURE — 36415 COLL VENOUS BLD VENIPUNCTURE: CPT

## 2024-09-21 RX ORDER — POTASSIUM CHLORIDE 750 MG/1
10 TABLET, EXTENDED RELEASE ORAL DAILY
COMMUNITY

## 2024-09-21 RX ORDER — ONDANSETRON 2 MG/ML
4 INJECTION INTRAMUSCULAR; INTRAVENOUS EVERY 6 HOURS PRN
Status: DISCONTINUED | OUTPATIENT
Start: 2024-09-21 | End: 2024-09-23 | Stop reason: HOSPADM

## 2024-09-21 RX ORDER — METFORMIN HCL 500 MG
500 TABLET, EXTENDED RELEASE 24 HR ORAL
COMMUNITY

## 2024-09-21 RX ORDER — ONDANSETRON 4 MG/1
4 TABLET, ORALLY DISINTEGRATING ORAL EVERY 6 HOURS PRN
Status: DISCONTINUED | OUTPATIENT
Start: 2024-09-21 | End: 2024-09-23 | Stop reason: HOSPADM

## 2024-09-21 RX ORDER — POLYETHYLENE GLYCOL 3350 17 G/17G
17 POWDER, FOR SOLUTION ORAL DAILY PRN
Status: DISCONTINUED | OUTPATIENT
Start: 2024-09-21 | End: 2024-09-23 | Stop reason: HOSPADM

## 2024-09-21 RX ORDER — SODIUM CHLORIDE 0.9 % (FLUSH) 0.9 %
10 SYRINGE (ML) INJECTION AS NEEDED
Status: DISCONTINUED | OUTPATIENT
Start: 2024-09-21 | End: 2024-09-23 | Stop reason: HOSPADM

## 2024-09-21 RX ORDER — SODIUM CHLORIDE 0.9 % (FLUSH) 0.9 %
10 SYRINGE (ML) INJECTION EVERY 12 HOURS SCHEDULED
Status: DISCONTINUED | OUTPATIENT
Start: 2024-09-21 | End: 2024-09-23 | Stop reason: HOSPADM

## 2024-09-21 RX ORDER — SODIUM CHLORIDE 9 MG/ML
40 INJECTION, SOLUTION INTRAVENOUS AS NEEDED
Status: DISCONTINUED | OUTPATIENT
Start: 2024-09-21 | End: 2024-09-23 | Stop reason: HOSPADM

## 2024-09-21 RX ORDER — FUROSEMIDE 10 MG/ML
40 INJECTION INTRAMUSCULAR; INTRAVENOUS ONCE
Status: COMPLETED | OUTPATIENT
Start: 2024-09-21 | End: 2024-09-21

## 2024-09-21 RX ORDER — METOPROLOL SUCCINATE 25 MG/1
25 TABLET, EXTENDED RELEASE ORAL DAILY
Status: DISCONTINUED | OUTPATIENT
Start: 2024-09-21 | End: 2024-09-23 | Stop reason: HOSPADM

## 2024-09-21 RX ORDER — TOPIRAMATE 25 MG/1
50 TABLET, FILM COATED ORAL EVERY 12 HOURS SCHEDULED
Status: DISCONTINUED | OUTPATIENT
Start: 2024-09-21 | End: 2024-09-23 | Stop reason: HOSPADM

## 2024-09-21 RX ORDER — FUROSEMIDE 20 MG
20 TABLET ORAL DAILY
Status: ON HOLD | COMMUNITY
End: 2024-09-23

## 2024-09-21 RX ORDER — ACETAMINOPHEN 650 MG/1
650 SUPPOSITORY RECTAL EVERY 4 HOURS PRN
Status: DISCONTINUED | OUTPATIENT
Start: 2024-09-21 | End: 2024-09-23 | Stop reason: HOSPADM

## 2024-09-21 RX ORDER — ACETAMINOPHEN 325 MG/1
650 TABLET ORAL EVERY 4 HOURS PRN
Status: DISCONTINUED | OUTPATIENT
Start: 2024-09-21 | End: 2024-09-23 | Stop reason: HOSPADM

## 2024-09-21 RX ORDER — BISACODYL 10 MG
10 SUPPOSITORY, RECTAL RECTAL DAILY PRN
Status: DISCONTINUED | OUTPATIENT
Start: 2024-09-21 | End: 2024-09-23 | Stop reason: HOSPADM

## 2024-09-21 RX ORDER — AMOXICILLIN 250 MG
2 CAPSULE ORAL 2 TIMES DAILY PRN
Status: DISCONTINUED | OUTPATIENT
Start: 2024-09-21 | End: 2024-09-23 | Stop reason: HOSPADM

## 2024-09-21 RX ORDER — LANOLIN ALCOHOL/MO/W.PET/CERES
1000 CREAM (GRAM) TOPICAL DAILY
Status: DISCONTINUED | OUTPATIENT
Start: 2024-09-21 | End: 2024-09-23 | Stop reason: HOSPADM

## 2024-09-21 RX ORDER — PANTOPRAZOLE SODIUM 40 MG/10ML
40 INJECTION, POWDER, LYOPHILIZED, FOR SOLUTION INTRAVENOUS
Status: DISCONTINUED | OUTPATIENT
Start: 2024-09-21 | End: 2024-09-23 | Stop reason: HOSPADM

## 2024-09-21 RX ORDER — ACETAMINOPHEN 160 MG/5ML
650 SOLUTION ORAL EVERY 4 HOURS PRN
Status: DISCONTINUED | OUTPATIENT
Start: 2024-09-21 | End: 2024-09-23 | Stop reason: HOSPADM

## 2024-09-21 RX ORDER — LISINOPRIL 10 MG/1
10 TABLET ORAL DAILY
Status: DISCONTINUED | OUTPATIENT
Start: 2024-09-21 | End: 2024-09-22

## 2024-09-21 RX ORDER — FUROSEMIDE 20 MG
20 TABLET ORAL DAILY
Status: DISCONTINUED | OUTPATIENT
Start: 2024-09-21 | End: 2024-09-23 | Stop reason: HOSPADM

## 2024-09-21 RX ORDER — CHOLECALCIFEROL (VITAMIN D3) 25 MCG
1000 TABLET ORAL DAILY
Status: DISCONTINUED | OUTPATIENT
Start: 2024-09-21 | End: 2024-09-23 | Stop reason: HOSPADM

## 2024-09-21 RX ORDER — NITROGLYCERIN 0.4 MG/1
0.4 TABLET SUBLINGUAL
COMMUNITY

## 2024-09-21 RX ORDER — ATORVASTATIN CALCIUM 40 MG/1
40 TABLET, FILM COATED ORAL DAILY
Status: DISCONTINUED | OUTPATIENT
Start: 2024-09-21 | End: 2024-09-22

## 2024-09-21 RX ORDER — PAROXETINE 20 MG/1
20 TABLET, FILM COATED ORAL EVERY MORNING
Status: DISCONTINUED | OUTPATIENT
Start: 2024-09-22 | End: 2024-09-23 | Stop reason: HOSPADM

## 2024-09-21 RX ORDER — NITROGLYCERIN 0.4 MG/1
0.4 TABLET SUBLINGUAL
Status: DISCONTINUED | OUTPATIENT
Start: 2024-09-21 | End: 2024-09-23 | Stop reason: HOSPADM

## 2024-09-21 RX ORDER — BISACODYL 5 MG/1
5 TABLET, DELAYED RELEASE ORAL DAILY PRN
Status: DISCONTINUED | OUTPATIENT
Start: 2024-09-21 | End: 2024-09-23 | Stop reason: HOSPADM

## 2024-09-21 RX ORDER — ASPIRIN 81 MG/1
81 TABLET ORAL DAILY
Status: DISCONTINUED | OUTPATIENT
Start: 2024-09-21 | End: 2024-09-23 | Stop reason: HOSPADM

## 2024-09-21 RX ORDER — LEVOTHYROXINE SODIUM 75 UG/1
75 TABLET ORAL DAILY
Status: DISCONTINUED | OUTPATIENT
Start: 2024-09-22 | End: 2024-09-23 | Stop reason: HOSPADM

## 2024-09-21 RX ADMIN — Medication 1000 UNITS: at 21:29

## 2024-09-21 RX ADMIN — FUROSEMIDE 40 MG: 10 INJECTION, SOLUTION INTRAMUSCULAR; INTRAVENOUS at 23:56

## 2024-09-21 RX ADMIN — TOPIRAMATE 50 MG: 25 TABLET, FILM COATED ORAL at 21:29

## 2024-09-21 RX ADMIN — ATORVASTATIN CALCIUM 40 MG: 40 TABLET, FILM COATED ORAL at 21:29

## 2024-09-21 RX ADMIN — METOPROLOL SUCCINATE 25 MG: 25 TABLET, EXTENDED RELEASE ORAL at 21:29

## 2024-09-21 RX ADMIN — PANTOPRAZOLE SODIUM 40 MG: 40 INJECTION, POWDER, FOR SOLUTION INTRAVENOUS at 23:56

## 2024-09-21 RX ADMIN — CYANOCOBALAMIN TAB 1000 MCG 1000 MCG: 1000 TAB at 21:29

## 2024-09-21 RX ADMIN — Medication 10 ML: at 23:57

## 2024-09-21 NOTE — H&P
[facility] Medicine Services  HISTORY AND PHYSICAL    Patient Name: Nette Montalvo  : 1945  MRN: [id number]  Primary Care Physician: Carol Virgen APRN  Date of admission: 2024    Subjective   Subjective     Chief Complaint:  Anemia, fatigue     HPI:  Nette Montalvo is a 79 y.o. female with past medical history significant for HFpEF, CAD s/p CABG, HTN, HLD, GERD, T2DM, and hypothyroidism who presents to the ED due 2 concern for anemia and worsening fatigue.  The patient reports that she saw her PCP on 2024 due to worsening fatigue and edema and had labs drawn.  Her PCP called today and reported hemoglobin was 6.9.  She was referred to the ED for further evaluation.  The patient was recently diagnosed with atrial fibrillation and was started on Eliquis.  She follows with cardiologist Dr. Powell.  The patient notes that she has had increased abdominal and lower extremity edema over the past several weeks.  Her PCP was concerned about fluid overload and she was started on Lasix 20 mg daily on 2024.  The patient indicates that she gained approximately 20 pounds over the past couple months.  She denies any recent chest pain, cough, abdominal pain, nausea, vomiting, or diarrhea.  She denies any recent bright red or dark black stools.  She does note that she will occasionally have small amounts of blood on tissue paper after wiping.  She is unable to recall when her last colonoscopy was.  Denies history of GI bleed.  Labs reviewed and significant for creatinine 1.19, glucose 110, INR 1.26, and hemoglobin 7.5.  Fecal occult was positive in the ED.  Vital signs were reviewed and significant for temperature 99.2 and blood pressure of 176/71 on presentation.  The patient will be admitted to hospital medicine for further evaluation and treatment.      Personal History     Past Medical History:   Diagnosis Date    Anxiety     Arthritis     Cataract, bilateral      BILATERAL    Chest pain     none recent    CHF (congestive heart failure)     Coronary artery disease     Depression     Diabetes mellitus     Disease of thyroid gland     Fracture     RIGHT SHOULDER FX, LEFT ANKLE FRACTURE    Full dentures     GERD (gastroesophageal reflux disease)     Heart disease     High cholesterol     History of nuclear stress test 2016    Hypertension     Migraine     Myocardial infarction 2006    s/p CABG x 4    Pneumonia     PONV (postoperative nausea and vomiting)     Seasonal allergies     Seizure     DAUGHTER STATED POSSIBLE SEIZURES.  STATES POSSIBLE MIGRANES/SEIZURES    Skin cancer     face and feet    Wears glasses              Past Surgical History:   Procedure Laterality Date    APPENDECTOMY      CARDIAC CATHETERIZATION      CATARACT EXTRACTION W/ INTRAOCULAR LENS IMPLANT Left 09/12/2017    Procedure: CATARACT PHACO EXTRACTION WITH INTRAOCULAR LENS IMPLANT LEFT;  Surgeon: Judy Clifton MD;  Location: [facility] OR;  Service:     CATARACT EXTRACTION W/ INTRAOCULAR LENS IMPLANT Right 08/14/2018    Procedure: CATARACT PHACO EXTRACTION WITH INTRAOCULAR LENS IMPLANT RIGHT;  Surgeon: Judy Clifton MD;  Location: [facility] OR;  Service: Ophthalmology    COLONOSCOPY N/A 6/10/2022    Procedure: COLONOSCOPY;  Surgeon: Susan Colon MD;  Location: [facility] ENDOSCOPY;  Service: Gastroenterology;  Laterality: N/A;    CORONARY ARTERY BYPASS GRAFT  2006    CABG x 4    ENDOSCOPY N/A 6/10/2022    Procedure: ESOPHAGOGASTRODUODENOSCOPY WITH BIOPSY;  Surgeon: Susan Colon MD;  Location: [facility] ENDOSCOPY;  Service: Gastroenterology;  Laterality: N/A;    SKIN BIOPSY      SKIN CANCER EXCISION      face and foot    TISSUE AORTIC VALVE REPLACEMENT  2017    bovine valve    TOTAL ABDOMINAL HYSTERECTOMY WITH SALPINGO OOPHORECTOMY         Family History:  family history includes Breast cancer in her sister; Cancer in an other family member; Cervical cancer in her mother; Diabetes in an  other family member; Heart attack in her father; Heart disease in her father and another family member.     Social History:  reports that she has never smoked. She has never been exposed to tobacco smoke. She has never used smokeless tobacco. She reports that she does not drink alcohol and does not use drugs.  Social History     Social History Narrative    Not on file       Medications:  Biotin, PARoxetine, acetaminophen, apixaban, aspirin, atorvastatin, cetirizine, cholecalciferol, furosemide, glucose blood, levothyroxine, lisinopril, magnesium oxide, metFORMIN ER, metoprolol succinate XL, nitroglycerin, potassium chloride, topiramate, and vitamin B-12    Allergies   Allergen Reactions    Ceftin [Cefuroxime Axetil] Confusion    Contrast Dye (Echo Or Unknown Ct/Mr) Rash       Objective   Objective     Vital Signs:   Temp:  [99.2 °F (37.3 °C)] 99.2 °F (37.3 °C)  Heart Rate:  [71-84] 82  Resp:  [18] 18  BP: (158-178)/(63-87) 178/87    Physical Exam   Constitutional: No acute distress, awake, alert, chronically ill appearing, frail, pale   HENT: NCAT, mucous membranes moist  Respiratory: course, diminished in bases, respiratory effort normal on RA   Cardiovascular: irregular, + murmurs, cap refill brisk   Gastrointestinal: Positive bowel sounds, soft, nontender, nondistended  Musculoskeletal: 2+ BLE edema   Psychiatric: Appropriate affect, cooperative  Neurologic: Oriented x 3, moves all extremities, speech clear  Skin: warm, dry, no visible rash     Result Review:  I have personally reviewed the results from the time of this admission to 9/21/2024 17:29 EDT and agree with these findings:  [x]  Laboratory list / accordion  []  Microbiology  [x]  Radiology  []  EKG/Telemetry   [x]  Cardiology/Vascular   []  Pathology  [x]  Old records  []  Other:  Most notable findings include:     LAB RESULTS:      Lab 09/21/24  1334   WBC 6.89   HEMOGLOBIN 7.5*   HEMATOCRIT 27.2*   PLATELETS 194   NEUTROS ABS 4.87   IMMATURE GRANS  (ABS) 0.03   LYMPHS ABS 1.42   MONOS ABS 0.42   EOS ABS 0.11   MCV 80.7   PROTIME 15.9*   APTT 37.1*         Lab 09/21/24  1334   SODIUM 143   POTASSIUM 3.9   CHLORIDE 107   CO2 24.0   ANION GAP 12.0   BUN 17   CREATININE 1.19*   EGFR 46.0*   GLUCOSE 110*   CALCIUM 9.1         Lab 09/21/24  1334   TOTAL PROTEIN 6.2   ALBUMIN 3.8   GLOBULIN 2.4   ALT (SGPT) 9   AST (SGOT) 21   BILIRUBIN 0.4   ALK PHOS 86         Lab 09/21/24  1334   PROTIME 15.9*   INR 1.26*             Lab 09/21/24  1455   ABO TYPING O   RH TYPING Positive   ANTIBODY SCREEN Positive         Brief Urine Lab Results       None          Microbiology Results (last 10 days)       ** No results found for the last 240 hours. **            CT Abdomen Pelvis Without Contrast    Result Date: 9/21/2024  CT ABDOMEN PELVIS WO CONTRAST Date of Exam: 9/21/2024 3:10 PM EDT Indication: GI BLEEDING. Anemia. Comparison: None available. Technique: Axial CT images were obtained of the abdomen and pelvis without the administration of contrast. Reconstructed coronal and sagittal images were also obtained. Automated exposure control and iterative construction methods were used. Findings: There are multiple pulmonary nodules in the lung bases that appears similar to the chest CT of 9/9/2023 allowing for motion on both examinations. For example, there is a groundglass nodule in the right lower lobe measuring 8 to 9 mm on image 5. There is a left lower lobe nodule measuring 6 to 7 mm on image 24. An area of nodular consolidation in the medial right middle lobe measures about 11 mm on image 3, previously about 13 mm. Multiple smaller nodules are noted throughout both lung bases. Attention on 6-month follow-up chest CT is recommended. Heart is enlarged. There is coronary artery disease, and the patient is status post TAVR. There is a trace amount of right pleural fluid now noted. There is diffuse atherosclerotic disease with no aortic aneurysm. Anasarca is noted. This is  particularly evident in the lower ventral abdominal wall. Correlate clinically to exclude cellulitis. Gallbladder contains stones. There is no biliary obstruction. There is increased density of the bilateral renal pyramids which may reflect nephrocalcinosis. There is an exophytic lesion arising from the lower pole of the left kidney measuring 1.6 cm. Nonemergent evaluation with renal ultrasound is recommended to exclude a mass. No ureteral stones or hydronephrosis on either side. There are parapelvic cysts in both kidneys. The unenhanced solid abdominal organs are otherwise normal. Urinary bladder is normal. Uterus is surgically absent. The appendix is surgically absent. Large bowel is normal without evidence of colitis. There is no small bowel obstruction identified. The stomach is normal. No free fluid or adenopathy is identified. There is diffuse degenerative disease in the lower thoracic spine and in the lumbar spine.     Impression: 1. Nodules in the lung bases as above are largely stable from a prior chest CT. There is a trace amount of right pleural fluid today. Attention on 6-month follow-up chest CT is recommended. 2. Evidence of nephrocalcinosis with no ureteral stones or hydronephrosis on either side. 3. Determinate exophytic left renal lesion. Follow-up with ultrasound is recommended to exclude a mass. 4. Cholelithiasis. 5. Appendectomy. No acute findings in the GI tract. 6. Anasarca with fairly intense fat stranding in the ventral lower abdominal wall. Correlate clinically to exclude cellulitis. 7. Additional nonacute findings as above. Electronically Signed: Mkie Lugo MD  9/21/2024 3:30 PM EDT  Workstation ID: [id number]     Results for orders placed in visit on 12/13/23    Adult Transthoracic Echo Complete W/ Cont if Necessary Per Protocol    Interpretation Summary  1.  Normal left ventricular size and systolic function, LVEF 50-55%.  2.  Mild concentric LVH.  3.  Normal LV diastolic filling  pattern.  4.  Normal right ventricular size and systolic function.  5.  Severely increased left atrial volume index.  6.  Status post TAVR with adequate functioning valve (mean gradient 15 mmHg).  7.  Mild tricuspid regurgitation, RVSP 44 mmHg.      Assessment & Plan   Assessment & Plan       Symptomatic anemia    Anemia    Atrial fibrillation, persistent    CAD (coronary artery disease), native coronary artery    Essential hypertension    Nette Montalvo is a 79 y.o. female with past medical history significant for HFpEF, CAD s/p CABG, HTN, HLD, GERD, T2DM, and hypothyroidism who presents to the ED due 2 concern for anemia and worsening fatigue.     GI bleed  Symptomatic anemia  Hx rectal prolapse  - on Eliquis d/t PAF, hold for now   - Hgb 7.5 on presentation   -Type & screen  -Transfuse 1 unit PRBCs  -Give Lasix 40 mg IV x1 now   -PPI IV BID  -NPO after MN   -GI consult in AM    HFpEF  PAF  Severe aortic stenosis   -Followed by cardiologist Dr. Powell  -Started on Lasix 20 mg daily on 9/20 by PCP d/t concern for volume overload   -Pt reports ~20lb weight gain over the past 2 months  -Give Lasix 40 mg IV x1 as above  -Hold home Eliquis for now   -Cardiology consult in AM   -Strict I&O, daily weights   -AM labs    Left renal lesion  Elevated Creatinine  -CT abd/pelvis revealed exphytic left renal lesion.   -Renal US ordered  -Baseline data deficit, Cr 1.19 on presentation  -hold home lisinopril for now  -Monitor with diuresis  -AM albs     HTN  HLD  -hold lisinopril as above   -continue metoprolol, Lipitor     Depression  -continue paxil     Hypothyroidism  -continue Synthroid   -TSH in AM     Lung nodules  -noted on CT abd/pelvis, appear largely stable from prior study   -Recommended 6 month follow up with CT Chest     DVT prophylaxis:  SCDs    CODE STATUS:    Medical Intervention Limits: No intubation (DNI)  Level Of Support Discussed With: Patient  Code Status (Patient has no pulse and is not breathing): No CPR  (Do Not Attempt to Resuscitate)  Medical Interventions (Patient has pulse or is breathing): Limited Support      Expected DischargeTBD, pending GI and cardiology evaluation.   Expected discharge date/ time has not been documented.    Signature: Electronically signed by JOHN Sanchez, 09/21/24, 5:28 PM EDT

## 2024-09-21 NOTE — ED NOTES
Nette Montalvo    Nursing Report ED to Floor:  Mental status: GCS 15  Ambulatory status: x1 assist; weakness and some dizziness with standing  Oxygen Therapy:  RA  Cardiac Rhythm: NSR, rate 72  Admitted from: home  Safety Concerns:  fall risk  Social Issues: none known  ED Room #:  2    ED Nurse Phone Extension - [phone] or may call [phone].      HPI:   Chief Complaint   Patient presents with    Abnormal Lab       Past Medical History:  Past Medical History:   Diagnosis Date    Anxiety     Arthritis     Cataract, bilateral     BILATERAL    Chest pain     none recent    CHF (congestive heart failure)     Coronary artery disease     Depression     Diabetes mellitus     Disease of thyroid gland     Fracture     RIGHT SHOULDER FX, LEFT ANKLE FRACTURE    Full dentures     GERD (gastroesophageal reflux disease)     Heart disease     High cholesterol     History of nuclear stress test 2016    Hypertension     Migraine     Myocardial infarction 2006    s/p CABG x 4    Pneumonia     PONV (postoperative nausea and vomiting)     Seasonal allergies     Seizure     DAUGHTER STATED POSSIBLE SEIZURES.  STATES POSSIBLE MIGRANES/SEIZURES    Skin cancer     face and feet    Wears glasses         Past Surgical History:  Past Surgical History:   Procedure Laterality Date    APPENDECTOMY      CARDIAC CATHETERIZATION      CATARACT EXTRACTION W/ INTRAOCULAR LENS IMPLANT Left 09/12/2017    Procedure: CATARACT PHACO EXTRACTION WITH INTRAOCULAR LENS IMPLANT LEFT;  Surgeon: Judy Clifton MD;  Location: [facility] OR;  Service:     CATARACT EXTRACTION W/ INTRAOCULAR LENS IMPLANT Right 08/14/2018    Procedure: CATARACT PHACO EXTRACTION WITH INTRAOCULAR LENS IMPLANT RIGHT;  Surgeon: Judy Clifton MD;  Location: [facility] OR;  Service: Ophthalmology    COLONOSCOPY N/A 6/10/2022    Procedure: COLONOSCOPY;  Surgeon: Susan Colon MD;  Location: [facility] ENDOSCOPY;  Service: Gastroenterology;  Laterality: N/A;    CORONARY ARTERY  BYPASS GRAFT  2006    CABG x 4    ENDOSCOPY N/A 6/10/2022    Procedure: ESOPHAGOGASTRODUODENOSCOPY WITH BIOPSY;  Surgeon: Susan Colon MD;  Location: [facility] ENDOSCOPY;  Service: Gastroenterology;  Laterality: N/A;    SKIN BIOPSY      SKIN CANCER EXCISION      face and foot    TISSUE AORTIC VALVE REPLACEMENT  2017    bovine valve    TOTAL ABDOMINAL HYSTERECTOMY WITH SALPINGO OOPHORECTOMY          Admitting Doctor:   Ashlee Segura MD    Consulting Provider(s):  Consults       No orders found from 8/23/2024 to 9/22/2024.             Admitting Diagnosis:   The primary encounter diagnosis was Acute GI bleeding. A diagnosis of Acute blood loss anemia was also pertinent to this visit.    Most Recent Vitals:   Vitals:    09/21/24 1430 09/21/24 1500 09/21/24 1530 09/21/24 1600   BP: 166/77 160/69 158/63 167/75   BP Location:       Patient Position:       Pulse: 71 76 75 79   Resp:       Temp:       TempSrc:       SpO2: 98% 97% 98% 98%   Weight:       Height:           Active LDAs/IV Access:   Lines, Drains & Airways       Active LDAs       Name Placement date Placement time Site Days    Peripheral IV 09/21/24 1335 Right Antecubital 09/21/24  1335  Antecubital  less than 1                    Labs (abnormal labs have a star):   Labs Reviewed   COMPREHENSIVE METABOLIC PANEL - Abnormal; Notable for the following components:       Result Value    Glucose 110 (*)     Creatinine 1.19 (*)     eGFR 46.0 (*)     All other components within normal limits    Narrative:     GFR Normal >60  Chronic Kidney Disease <60  Kidney Failure <15    The GFR formula is only valid for adults with stable renal function between ages 18 and 70.   PROTIME-INR - Abnormal; Notable for the following components:    Protime 15.9 (*)     INR 1.26 (*)     All other components within normal limits   APTT - Abnormal; Notable for the following components:    PTT 37.1 (*)     All other components within normal limits    Narrative:     PTT = The equivalent PTT  values for the therapeutic range of heparin levels at 0.3 to 0.5 U/ml are 60 to 70 seconds.   CBC WITH AUTO DIFFERENTIAL - Abnormal; Notable for the following components:    RBC 3.37 (*)     Hemoglobin 7.5 (*)     Hematocrit 27.2 (*)     MCH 22.3 (*)     MCHC 27.6 (*)     RDW 17.1 (*)     All other components within normal limits   POCT OCCULT BLOOD STOOL - Abnormal; Notable for the following components:    Fecal Occult Blood Positive (*)     All other components within normal limits   TYPE AND SCREEN   PREPARE RBC   ABORH 2ND SPECIMEN VERIFICATION   CBC AND DIFFERENTIAL    Narrative:     The following orders were created for panel order CBC & Differential.  Procedure                               Abnormality         Status                     ---------                               -----------         ------                     CBC Auto Differential[[id number]]        Abnormal            Final result                 Please view results for these tests on the individual orders.       Meds Given in ED:   Medications   sodium chloride 0.9 % flush 10 mL (has no administration in time range)           Last NIH score:                                                          Dysphagia screening results:        Iselin Coma Scale:  No data recorded     CIWA:        Restraint Type:            Isolation Status:  No active isolations

## 2024-09-21 NOTE — ED PROVIDER NOTES
Subjective   History of Present Illness  79-year-old female who presents for evaluation of anemia and fatigue.  The patient reports that she has been noted to become progressively more anemic over the last month.  She had lab evaluation performed yesterday by her primary care physician and had hemoglobin 6.9.  She was advised to present to the ER.  She denies any black tarry stool.  She does report that after a bowel movement she will have rectal prolapse and when she wipes her some blood but this has been present for quite some time.  She reports that several months ago she was diagnosed with A-fib and initiated on Eliquis.  She has been progressively more fatigued for the last 2 weeks to 1 month.  No fever, bodies, or chills.  She feels lightheaded with standing.  No sick contacts.  No chest pain or abdominal pain.  She exhibits normal mentation neurological exam is normal and intact at baseline.      Review of Systems   Constitutional:  Positive for activity change and fatigue. Negative for chills and fever.   HENT:  Negative for congestion, ear pain, postnasal drip, sinus pressure and sore throat.    Eyes:  Negative for pain, redness and visual disturbance.   Respiratory:  Negative for cough, chest tightness and shortness of breath.    Cardiovascular:  Negative for chest pain, palpitations and leg swelling.   Gastrointestinal:  Negative for abdominal pain, anal bleeding, blood in stool, diarrhea, nausea and vomiting.   Endocrine: Negative for polydipsia and polyuria.   Genitourinary:  Negative for difficulty urinating, dysuria, frequency and urgency.   Musculoskeletal:  Negative for arthralgias, back pain and neck pain.   Skin:  Positive for pallor. Negative for rash.   Allergic/Immunologic: Negative for environmental allergies and immunocompromised state.   Neurological:  Positive for light-headedness. Negative for dizziness, weakness and headaches.   Hematological:  Negative for adenopathy.    Psychiatric/Behavioral:  Negative for confusion, self-injury and suicidal ideas. The patient is not nervous/anxious.    All other systems reviewed and are negative.      Past Medical History:   Diagnosis Date    Anxiety     Arthritis     Cataract, bilateral     BILATERAL    Chest pain     none recent    CHF (congestive heart failure)     Coronary artery disease     Depression     Diabetes mellitus     Disease of thyroid gland     Fracture     RIGHT SHOULDER FX, LEFT ANKLE FRACTURE    Full dentures     GERD (gastroesophageal reflux disease)     Heart disease     High cholesterol     History of nuclear stress test 2016    Hypertension     Migraine     Myocardial infarction 2006    s/p CABG x 4    Pneumonia     PONV (postoperative nausea and vomiting)     Seasonal allergies     Seizure     DAUGHTER STATED POSSIBLE SEIZURES.  STATES POSSIBLE MIGRANES/SEIZURES    Skin cancer     face and feet    Wears glasses        Allergies   Allergen Reactions    Ceftin [Cefuroxime Axetil] Confusion    Contrast Dye (Echo Or Unknown Ct/Mr) Rash       Past Surgical History:   Procedure Laterality Date    APPENDECTOMY      CARDIAC CATHETERIZATION      CATARACT EXTRACTION W/ INTRAOCULAR LENS IMPLANT Left 09/12/2017    Procedure: CATARACT PHACO EXTRACTION WITH INTRAOCULAR LENS IMPLANT LEFT;  Surgeon: Judy Clifton MD;  Location: [facility] OR;  Service:     CATARACT EXTRACTION W/ INTRAOCULAR LENS IMPLANT Right 08/14/2018    Procedure: CATARACT PHACO EXTRACTION WITH INTRAOCULAR LENS IMPLANT RIGHT;  Surgeon: Judy Clifton MD;  Location: [facility] OR;  Service: Ophthalmology    COLONOSCOPY N/A 6/10/2022    Procedure: COLONOSCOPY;  Surgeon: Susan Colon MD;  Location: [facility] ENDOSCOPY;  Service: Gastroenterology;  Laterality: N/A;    CORONARY ARTERY BYPASS GRAFT  2006    CABG x 4    ENDOSCOPY N/A 6/10/2022    Procedure: ESOPHAGOGASTRODUODENOSCOPY WITH BIOPSY;  Surgeon: Susan Colon MD;  Location: [facility] ENDOSCOPY;   Service: Gastroenterology;  Laterality: N/A;    SKIN BIOPSY      SKIN CANCER EXCISION      face and foot    TISSUE AORTIC VALVE REPLACEMENT  2017    bovine valve    TOTAL ABDOMINAL HYSTERECTOMY WITH SALPINGO OOPHORECTOMY         Family History   Problem Relation Age of Onset    Cervical cancer Mother     Heart attack Father     Heart disease Father     Breast cancer Sister     Heart disease Other     Diabetes Other     Cancer Other     Colon cancer Neg Hx        Social History     Socioeconomic History    Marital status:    Tobacco Use    Smoking status: Never     Passive exposure: Never    Smokeless tobacco: Never   Vaping Use    Vaping status: Never Used   Substance and Sexual Activity    Alcohol use: No    Drug use: No    Sexual activity: Defer           Objective   Physical Exam  Vitals and nursing note reviewed.   Constitutional:       General: She is not in acute distress.     Appearance: Normal appearance. She is well-developed. She is not toxic-appearing or diaphoretic.   HENT:      Head: Normocephalic and atraumatic.      Right Ear: External ear normal.      Left Ear: External ear normal.      Nose: Nose normal.   Eyes:      General: Lids are normal.      Pupils: Pupils are equal, round, and reactive to light.   Neck:      Trachea: No tracheal deviation.   Cardiovascular:      Rate and Rhythm: Normal rate and regular rhythm.      Pulses: No decreased pulses.      Heart sounds: Normal heart sounds. No murmur heard.     No friction rub. No gallop.   Pulmonary:      Effort: Pulmonary effort is normal. No respiratory distress.      Breath sounds: Normal breath sounds. No decreased breath sounds, wheezing, rhonchi or rales.   Abdominal:      General: Bowel sounds are normal.      Palpations: Abdomen is soft.      Tenderness: There is no abdominal tenderness. There is no guarding or rebound.   Musculoskeletal:         General: No deformity. Normal range of motion.      Cervical back: Normal range of  motion and neck supple.   Lymphadenopathy:      Cervical: No cervical adenopathy.   Skin:     General: Skin is warm and dry.      Coloration: Skin is pale.      Findings: No rash.   Neurological:      Mental Status: She is alert and oriented to person, place, and time.      Cranial Nerves: No cranial nerve deficit.      Sensory: No sensory deficit.   Psychiatric:         Speech: Speech normal.         Behavior: Behavior normal.         Thought Content: Thought content normal.         Judgment: Judgment normal.         Procedures           ED Course  ED Course as of 09/22/24 1248   Sat Sep 21, 2024   1541 The patient was initially on Eliquis a few months back due to newly diagnosed A-fib.  She has been progressively feeling more fatigued over the last month.  Anemia has also been declining over the last month.  She had outpatient labs performed yesterday showed a hemoglobin of 6.9 and she was advised to present here to the ER.  She is positive for blood in the stool.  Hemoglobin is better on evaluation here today.  She reports symptomatic anemia with lightheadedness and fatigue and she appears pale. Labs otherwise reassuring and CT scan nonconcerning for acute GI bleed. [NS]   Sun Sep 22, 2024   1248 Differential diagnosis includes adverse medication effect, GI bleed, other unspecified etiology.    Secondary to Eliquis use the patient is felt to have blood in the stool and was occult stool positive on exam.    Hemoglobin today is 7.5 but her previous outpatient labs show to be at 6.9.  The patient is also very symptomatic with lightheadedness.    I have initiated type and screen and 1 unit of packed red blood cells for transfusion.  Creatinine is nonconcerning with no significant electrolyte abnormalities normal white count and normal white blood cell differential.    CT scan abdomen pelvis shows nodules at lung bases stable from prior CT scan.  Trace right pleural effusion.  Evidence of nephrocalcinosis with no  ureteral stones or hydronephrosis on either side.  Cholelithiasis no signs of cholecystitis.  Anasarca with fairly intense fat stranding in the ventral lower abdominal well.    I discussed the patient with hospitalist, who will consult on the patient to determine status of admission. [NS]      ED Course User Index  [NS] Elvis Alfaro MD            [id number] Score: 7                                  Medical Decision Making  Differential diagnosis includes adverse medication effect, acute GI bleed, anemia, dehydration, infectious process, other unspecified etiology.    No fever.  No definitive infectious process on the workup.  No complaint of dysuria.  No upper respiratory complaints.  Normal white count.    Labs show slightly elevated creatinine with no significant electrolyte abnormalities.  Hemoglobin 7.5.  Labs are performed outpatient from yesterday were reviewed and it was 6.9.    Stool was positive for blood.    CT scan is pending.    Vital signs overall stable.    I will discuss the patient with the hospitalist to consult to determine status of admission.    Problems Addressed:  Acute blood loss anemia: complicated acute illness or injury with systemic symptoms that poses a threat to life or bodily functions  Acute GI bleeding: complicated acute illness or injury with systemic symptoms that poses a threat to life or bodily functions    Amount and/or Complexity of Data Reviewed  External Data Reviewed: labs and radiology.  Labs: ordered. Decision-making details documented in ED Course.  Radiology: ordered and independent interpretation performed. Decision-making details documented in ED Course.    Risk  Prescription drug management.  Decision regarding hospitalization.        Final diagnoses:   Acute GI bleeding   Acute blood loss anemia       ED Disposition  ED Disposition       ED Disposition   Decision to Admit    Condition   --    Comment   Level of Care: Telemetry [5]   Diagnosis: Symptomatic  anemia [[id number]]   Admitting Physician: DEE DEE LOYA [[id number]]   Bed Request Comments: CDU                 No follow-up provider specified.       Medication List      No changes were made to your prescriptions during this visit.            Elvis Alfaro MD  09/22/24 1002

## 2024-09-21 NOTE — Clinical Note
Level of Care: Telemetry [5]   Diagnosis: Symptomatic anemia [[id number]]   Admitting Physician: DEE DEE LOYA [[id number]]   Is patient appropriate for Observation Unit?: Yes [1]   Bed Request Comments: CDU

## 2024-09-22 ENCOUNTER — APPOINTMENT (OUTPATIENT)
Dept: ULTRASOUND IMAGING | Facility: HOSPITAL | Age: 79
DRG: 393 | End: 2024-09-22
Payer: MEDICARE

## 2024-09-22 ENCOUNTER — APPOINTMENT (OUTPATIENT)
Dept: CARDIOLOGY | Facility: HOSPITAL | Age: 79
DRG: 393 | End: 2024-09-22
Payer: MEDICARE

## 2024-09-22 LAB
ALBUMIN SERPL-MCNC: 3.4 G/DL (ref 3.5–5.2)
ALBUMIN/GLOB SERPL: 1.5 G/DL
ALP SERPL-CCNC: 67 U/L (ref 39–117)
ALT SERPL W P-5'-P-CCNC: 7 U/L (ref 1–33)
ANION GAP SERPL CALCULATED.3IONS-SCNC: 10 MMOL/L (ref 5–15)
ASCENDING AORTA: 3.9 CM
AST SERPL-CCNC: 18 U/L (ref 1–32)
BASOPHILS # BLD AUTO: 0.03 10*3/MM3 (ref 0–0.2)
BASOPHILS NFR BLD AUTO: 0.5 % (ref 0–1.5)
BH BB BLOOD EXPIRATION DATE: NORMAL
BH BB BLOOD TYPE BARCODE: 9500
BH BB DISPENSE STATUS: NORMAL
BH BB PRODUCT CODE: NORMAL
BH BB UNIT NUMBER: NORMAL
BH CV ECHO MEAS - AI P1/2T: 290.6 MSEC
BH CV ECHO MEAS - AO MAX PG: 18.8 MMHG
BH CV ECHO MEAS - AO MEAN PG: 10 MMHG
BH CV ECHO MEAS - AO V2 MAX: 216.7 CM/SEC
BH CV ECHO MEAS - AO V2 VTI: 47.6 CM
BH CV ECHO MEAS - AVA(I,D): 1.47 CM2
BH CV ECHO MEAS - EDV(CUBED): 110.6 ML
BH CV ECHO MEAS - EDV(MOD-SP2): 61.8 ML
BH CV ECHO MEAS - EDV(MOD-SP4): 106 ML
BH CV ECHO MEAS - EF(MOD-BP): 57.6 %
BH CV ECHO MEAS - EF(MOD-SP2): 65.2 %
BH CV ECHO MEAS - EF(MOD-SP4): 49.4 %
BH CV ECHO MEAS - ESV(CUBED): 19.7 ML
BH CV ECHO MEAS - ESV(MOD-SP2): 21.5 ML
BH CV ECHO MEAS - ESV(MOD-SP4): 53.6 ML
BH CV ECHO MEAS - FS: 43.8 %
BH CV ECHO MEAS - IVS/LVPW: 1.13 CM
BH CV ECHO MEAS - IVSD: 0.9 CM
BH CV ECHO MEAS - LA DIMENSION: 4.1 CM
BH CV ECHO MEAS - LAT PEAK E' VEL: 12.5 CM/SEC
BH CV ECHO MEAS - LV MASS(C)D: 137.1 GRAMS
BH CV ECHO MEAS - LV MAX PG: 6.4 MMHG
BH CV ECHO MEAS - LV MEAN PG: 3 MMHG
BH CV ECHO MEAS - LV V1 MAX: 126.5 CM/SEC
BH CV ECHO MEAS - LV V1 VTI: 27.5 CM
BH CV ECHO MEAS - LVIDD: 4.8 CM
BH CV ECHO MEAS - LVIDS: 2.7 CM
BH CV ECHO MEAS - LVOT AREA: 2.5 CM2
BH CV ECHO MEAS - LVOT DIAM: 1.8 CM
BH CV ECHO MEAS - LVPWD: 0.8 CM
BH CV ECHO MEAS - MED PEAK E' VEL: 9.3 CM/SEC
BH CV ECHO MEAS - MR MAX PG: 161.8 MMHG
BH CV ECHO MEAS - MR MAX VEL: 636 CM/SEC
BH CV ECHO MEAS - MR MEAN PG: 110 MMHG
BH CV ECHO MEAS - MR MEAN VEL: 496 CM/SEC
BH CV ECHO MEAS - MR VTI: 235 CM
BH CV ECHO MEAS - MV A MAX VEL: 47.7 CM/SEC
BH CV ECHO MEAS - MV DEC SLOPE: 619.5 CM/SEC2
BH CV ECHO MEAS - MV DEC TIME: 0.16 SEC
BH CV ECHO MEAS - MV E MAX VEL: 102 CM/SEC
BH CV ECHO MEAS - MV E/A: 2.14
BH CV ECHO MEAS - MV MAX PG: 4.9 MMHG
BH CV ECHO MEAS - MV MEAN PG: 1 MMHG
BH CV ECHO MEAS - MV P1/2T: 55.8 MSEC
BH CV ECHO MEAS - MV V2 VTI: 28.6 CM
BH CV ECHO MEAS - MVA(P1/2T): 3.9 CM2
BH CV ECHO MEAS - MVA(VTI): 2.44 CM2
BH CV ECHO MEAS - PA ACC TIME: 0.08 SEC
BH CV ECHO MEAS - PA V2 MAX: 81.8 CM/SEC
BH CV ECHO MEAS - PI END-D VEL: 99.5 CM/SEC
BH CV ECHO MEAS - RAP SYSTOLE: 3 MMHG
BH CV ECHO MEAS - RF(MV,LVOT)(1DIAM): 0.36 CM
BH CV ECHO MEAS - RVSP: 36 MMHG
BH CV ECHO MEAS - SV(LVOT): 69.9 ML
BH CV ECHO MEAS - SV(MOD-SP2): 40.3 ML
BH CV ECHO MEAS - SV(MOD-SP4): 52.4 ML
BH CV ECHO MEAS - TAPSE (>1.6): 1.43 CM
BH CV ECHO MEAS - TR MAX PG: 32.8 MMHG
BH CV ECHO MEAS - TR MAX VEL: 284.8 CM/SEC
BH CV ECHO MEASUREMENTS AVERAGE E/E' RATIO: 9.36
BH CV VAS BP RIGHT ARM: NORMAL MMHG
BH CV XLRA - RV BASE: 4.3 CM
BH CV XLRA - RV LENGTH: 7.3 CM
BH CV XLRA - RV MID: 4.1 CM
BH CV XLRA - TDI S': 10 CM/SEC
BILIRUB SERPL-MCNC: 1 MG/DL (ref 0–1.2)
BUN SERPL-MCNC: 16 MG/DL (ref 8–23)
BUN/CREAT SERPL: 13.7 (ref 7–25)
CALCIUM SPEC-SCNC: 8.8 MG/DL (ref 8.6–10.5)
CHLORIDE SERPL-SCNC: 104 MMOL/L (ref 98–107)
CO2 SERPL-SCNC: 27 MMOL/L (ref 22–29)
CREAT SERPL-MCNC: 1.17 MG/DL (ref 0.57–1)
CROSSMATCH INTERPRETATION: NORMAL
DEPRECATED RDW RBC AUTO: 47 FL (ref 37–54)
EGFRCR SERPLBLD CKD-EPI 2021: 47 ML/MIN/1.73
EOSINOPHIL # BLD AUTO: 0.11 10*3/MM3 (ref 0–0.4)
EOSINOPHIL NFR BLD AUTO: 1.7 % (ref 0.3–6.2)
ERYTHROCYTE [DISTWIDTH] IN BLOOD BY AUTOMATED COUNT: 16.5 % (ref 12.3–15.4)
GLOBULIN UR ELPH-MCNC: 2.3 GM/DL
GLUCOSE SERPL-MCNC: 128 MG/DL (ref 65–99)
HBA1C MFR BLD: 5.7 % (ref 4.8–5.6)
HCT VFR BLD AUTO: 28 % (ref 34–46.6)
HGB BLD-MCNC: 8.3 G/DL (ref 12–15.9)
IMM GRANULOCYTES # BLD AUTO: 0.04 10*3/MM3 (ref 0–0.05)
IMM GRANULOCYTES NFR BLD AUTO: 0.6 % (ref 0–0.5)
IVRT: 85 MS
LEFT ATRIUM VOLUME INDEX: 48.9 ML/M2
LYMPHOCYTES # BLD AUTO: 1.34 10*3/MM3 (ref 0.7–3.1)
LYMPHOCYTES NFR BLD AUTO: 21 % (ref 19.6–45.3)
MAGNESIUM SERPL-MCNC: 1.5 MG/DL (ref 1.6–2.4)
MCH RBC QN AUTO: 23.3 PG (ref 26.6–33)
MCHC RBC AUTO-ENTMCNC: 29.6 G/DL (ref 31.5–35.7)
MCV RBC AUTO: 78.7 FL (ref 79–97)
MONOCYTES # BLD AUTO: 0.51 10*3/MM3 (ref 0.1–0.9)
MONOCYTES NFR BLD AUTO: 8 % (ref 5–12)
NEUTROPHILS NFR BLD AUTO: 4.35 10*3/MM3 (ref 1.7–7)
NEUTROPHILS NFR BLD AUTO: 68.2 % (ref 42.7–76)
NRBC BLD AUTO-RTO: 0 /100 WBC (ref 0–0.2)
PLATELET # BLD AUTO: 160 10*3/MM3 (ref 140–450)
PMV BLD AUTO: 12 FL (ref 6–12)
POTASSIUM SERPL-SCNC: 3.4 MMOL/L (ref 3.5–5.2)
POTASSIUM SERPL-SCNC: 4.2 MMOL/L (ref 3.5–5.2)
PROT SERPL-MCNC: 5.7 G/DL (ref 6–8.5)
RBC # BLD AUTO: 3.56 10*6/MM3 (ref 3.77–5.28)
SODIUM SERPL-SCNC: 141 MMOL/L (ref 136–145)
TSH SERPL DL<=0.05 MIU/L-ACNC: 3.8 UIU/ML (ref 0.27–4.2)
UNIT  ABO: NORMAL
UNIT  RH: NORMAL
WBC NRBC COR # BLD AUTO: 6.38 10*3/MM3 (ref 3.4–10.8)

## 2024-09-22 PROCEDURE — 80053 COMPREHEN METABOLIC PANEL: CPT | Performed by: NURSE PRACTITIONER

## 2024-09-22 PROCEDURE — 76775 US EXAM ABDO BACK WALL LIM: CPT

## 2024-09-22 PROCEDURE — 84132 ASSAY OF SERUM POTASSIUM: CPT | Performed by: STUDENT IN AN ORGANIZED HEALTH CARE EDUCATION/TRAINING PROGRAM

## 2024-09-22 PROCEDURE — 99232 SBSQ HOSP IP/OBS MODERATE 35: CPT | Performed by: INTERNAL MEDICINE

## 2024-09-22 PROCEDURE — 25010000002 MAGNESIUM SULFATE 2 GM/50ML SOLUTION: Performed by: STUDENT IN AN ORGANIZED HEALTH CARE EDUCATION/TRAINING PROGRAM

## 2024-09-22 PROCEDURE — 99223 1ST HOSP IP/OBS HIGH 75: CPT | Performed by: NURSE PRACTITIONER

## 2024-09-22 PROCEDURE — 84443 ASSAY THYROID STIM HORMONE: CPT | Performed by: NURSE PRACTITIONER

## 2024-09-22 PROCEDURE — 83036 HEMOGLOBIN GLYCOSYLATED A1C: CPT | Performed by: NURSE PRACTITIONER

## 2024-09-22 PROCEDURE — 83735 ASSAY OF MAGNESIUM: CPT | Performed by: NURSE PRACTITIONER

## 2024-09-22 PROCEDURE — 99222 1ST HOSP IP/OBS MODERATE 55: CPT

## 2024-09-22 PROCEDURE — 93306 TTE W/DOPPLER COMPLETE: CPT | Performed by: INTERNAL MEDICINE

## 2024-09-22 PROCEDURE — 93306 TTE W/DOPPLER COMPLETE: CPT

## 2024-09-22 PROCEDURE — 85025 COMPLETE CBC W/AUTO DIFF WBC: CPT | Performed by: NURSE PRACTITIONER

## 2024-09-22 RX ORDER — ATORVASTATIN CALCIUM 40 MG/1
40 TABLET, FILM COATED ORAL NIGHTLY
Status: DISCONTINUED | OUTPATIENT
Start: 2024-09-22 | End: 2024-09-23 | Stop reason: HOSPADM

## 2024-09-22 RX ORDER — POTASSIUM CHLORIDE 1500 MG/1
40 TABLET, EXTENDED RELEASE ORAL EVERY 4 HOURS
Status: COMPLETED | OUTPATIENT
Start: 2024-09-22 | End: 2024-09-22

## 2024-09-22 RX ORDER — MAGNESIUM SULFATE HEPTAHYDRATE 40 MG/ML
2 INJECTION, SOLUTION INTRAVENOUS
Status: COMPLETED | OUTPATIENT
Start: 2024-09-22 | End: 2024-09-22

## 2024-09-22 RX ORDER — LOSARTAN POTASSIUM 50 MG/1
50 TABLET ORAL
Status: DISCONTINUED | OUTPATIENT
Start: 2024-09-22 | End: 2024-09-23 | Stop reason: HOSPADM

## 2024-09-22 RX ADMIN — POTASSIUM CHLORIDE 40 MEQ: 1500 TABLET, EXTENDED RELEASE ORAL at 12:45

## 2024-09-22 RX ADMIN — MAGNESIUM SULFATE HEPTAHYDRATE 2 G: 40 INJECTION, SOLUTION INTRAVENOUS at 09:15

## 2024-09-22 RX ADMIN — MAGNESIUM SULFATE HEPTAHYDRATE 2 G: 40 INJECTION, SOLUTION INTRAVENOUS at 12:45

## 2024-09-22 RX ADMIN — ATORVASTATIN CALCIUM 40 MG: 40 TABLET, FILM COATED ORAL at 21:49

## 2024-09-22 RX ADMIN — METOPROLOL SUCCINATE 25 MG: 25 TABLET, EXTENDED RELEASE ORAL at 09:13

## 2024-09-22 RX ADMIN — TOPIRAMATE 50 MG: 25 TABLET, FILM COATED ORAL at 21:49

## 2024-09-22 RX ADMIN — MAGNESIUM SULFATE HEPTAHYDRATE 2 G: 40 INJECTION, SOLUTION INTRAVENOUS at 15:03

## 2024-09-22 RX ADMIN — TOPIRAMATE 50 MG: 25 TABLET, FILM COATED ORAL at 09:13

## 2024-09-22 RX ADMIN — LOSARTAN POTASSIUM 50 MG: 50 TABLET, FILM COATED ORAL at 13:05

## 2024-09-22 RX ADMIN — LEVOTHYROXINE SODIUM 75 MCG: 0.07 TABLET ORAL at 05:02

## 2024-09-22 RX ADMIN — CYANOCOBALAMIN TAB 1000 MCG 1000 MCG: 1000 TAB at 09:13

## 2024-09-22 RX ADMIN — Medication 1000 UNITS: at 09:13

## 2024-09-22 RX ADMIN — PAROXETINE HYDROCHLORIDE 20 MG: 20 TABLET, FILM COATED ORAL at 09:13

## 2024-09-22 RX ADMIN — POTASSIUM CHLORIDE 40 MEQ: 1500 TABLET, EXTENDED RELEASE ORAL at 09:13

## 2024-09-22 RX ADMIN — Medication 10 ML: at 09:40

## 2024-09-22 RX ADMIN — PANTOPRAZOLE SODIUM 40 MG: 40 INJECTION, POWDER, FOR SOLUTION INTRAVENOUS at 17:48

## 2024-09-22 RX ADMIN — PANTOPRAZOLE SODIUM 40 MG: 40 INJECTION, POWDER, FOR SOLUTION INTRAVENOUS at 09:23

## 2024-09-22 NOTE — PLAN OF CARE
Problem: Adult Inpatient Plan of Care  Goal: Plan of Care Review  Outcome: Ongoing, Progressing  Goal: Patient-Specific Goal (Individualized)  Outcome: Ongoing, Progressing  Goal: Absence of Hospital-Acquired Illness or Injury  Outcome: Ongoing, Progressing  Intervention: Identify and Manage Fall Risk  Recent Flowsheet Documentation  Taken 9/22/2024 1800 by Jennifer Triplett RN  Safety Promotion/Fall Prevention:   activity supervised   assistive device/personal items within reach   clutter free environment maintained   lighting adjusted   nonskid shoes/slippers when out of bed  Taken 9/22/2024 1600 by Jennifer Triplett RN  Safety Promotion/Fall Prevention:   activity supervised   assistive device/personal items within reach   clutter free environment maintained   lighting adjusted   nonskid shoes/slippers when out of bed  Taken 9/22/2024 1400 by Jennifer Triplett RN  Safety Promotion/Fall Prevention:   activity supervised   assistive device/personal items within reach   clutter free environment maintained   lighting adjusted   nonskid shoes/slippers when out of bed  Taken 9/22/2024 1200 by Jennifer Triplett RN  Safety Promotion/Fall Prevention:   activity supervised   assistive device/personal items within reach   clutter free environment maintained   lighting adjusted   nonskid shoes/slippers when out of bed  Taken 9/22/2024 1000 by Jennifer Triplett RN  Safety Promotion/Fall Prevention: safety round/check completed  Taken 9/22/2024 0800 by Jennifer Triplett RN  Safety Promotion/Fall Prevention: safety round/check completed  Intervention: Prevent Skin Injury  Recent Flowsheet Documentation  Taken 9/22/2024 1800 by Jennifer Triplett RN  Body Position: position changed independently  Skin Protection:   adhesive use limited   incontinence pads utilized   transparent dressing maintained   tubing/devices free from skin contact  Taken 9/22/2024 1600 by Jennifer Triplett RN  Body Position: position changed  independently  Skin Protection:   adhesive use limited   incontinence pads utilized   transparent dressing maintained   tubing/devices free from skin contact  Taken 9/22/2024 1400 by Jennifer Triplett RN  Body Position: position changed independently  Skin Protection:   adhesive use limited   incontinence pads utilized   transparent dressing maintained   tubing/devices free from skin contact  Taken 9/22/2024 1200 by Jennifer Triplett RN  Body Position: position changed independently  Skin Protection:   adhesive use limited   incontinence pads utilized   transparent dressing maintained   tubing/devices free from skin contact  Taken 9/22/2024 1000 by Jennifer Triplett RN  Body Position: position changed independently  Skin Protection:   adhesive use limited   incontinence pads utilized   transparent dressing maintained   tubing/devices free from skin contact  Taken 9/22/2024 0800 by Jennifer Triplett RN  Body Position: position changed independently  Skin Protection:   adhesive use limited   incontinence pads utilized   transparent dressing maintained   tubing/devices free from skin contact  Intervention: Prevent and Manage VTE (Venous Thromboembolism) Risk  Recent Flowsheet Documentation  Taken 9/22/2024 1800 by Jennifer Triplett RN  Activity Management: activity encouraged  Taken 9/22/2024 1600 by Jennifer Triplett RN  Activity Management: activity encouraged  Taken 9/22/2024 1400 by Jennifer Triplett RN  Activity Management: activity encouraged  Taken 9/22/2024 1200 by Jennifer Triplett RN  Activity Management: activity encouraged  Taken 9/22/2024 1000 by Jennifer Triplett RN  Activity Management: activity encouraged  Taken 9/22/2024 0800 by Jennifer Triplett RN  Activity Management: activity encouraged  Range of Motion: active ROM (range of motion) encouraged  Intervention: Prevent Infection  Recent Flowsheet Documentation  Taken 9/22/2024 1200 by Jennifer Triplett RN  Infection Prevention:    environmental surveillance performed   single patient room provided  Taken 9/22/2024 1000 by Jennifer Triplett RN  Infection Prevention: single patient room provided  Taken 9/22/2024 0800 by Jennifer Triplett RN  Infection Prevention: single patient room provided  Goal: Optimal Comfort and Wellbeing  Outcome: Ongoing, Progressing  Goal: Readiness for Transition of Care  Outcome: Ongoing, Progressing     Problem: Fall Injury Risk  Goal: Absence of Fall and Fall-Related Injury  Outcome: Ongoing, Progressing  Intervention: Identify and Manage Contributors  Recent Flowsheet Documentation  Taken 9/22/2024 1800 by Jennifer Triplett RN  Medication Review/Management: medications reviewed  Taken 9/22/2024 1600 by Jennifer Triplett RN  Medication Review/Management: medications reviewed  Taken 9/22/2024 1400 by Jennifer Triplett RN  Medication Review/Management: medications reviewed  Taken 9/22/2024 1200 by Jennifer Triplett RN  Medication Review/Management: medications reviewed  Taken 9/22/2024 1000 by Jennifer Triplett RN  Medication Review/Management: medications reviewed  Taken 9/22/2024 0800 by Jennifer Triplett RN  Medication Review/Management: medications reviewed  Intervention: Promote Injury-Free Environment  Recent Flowsheet Documentation  Taken 9/22/2024 1800 by Jennifer Triplett RN  Safety Promotion/Fall Prevention:   activity supervised   assistive device/personal items within reach   clutter free environment maintained   lighting adjusted   nonskid shoes/slippers when out of bed  Taken 9/22/2024 1600 by Jennifer Triplett RN  Safety Promotion/Fall Prevention:   activity supervised   assistive device/personal items within reach   clutter free environment maintained   lighting adjusted   nonskid shoes/slippers when out of bed  Taken 9/22/2024 1400 by Jennifer Triplett RN  Safety Promotion/Fall Prevention:   activity supervised   assistive device/personal items within reach   clutter free environment  maintained   lighting adjusted   nonskid shoes/slippers when out of bed  Taken 9/22/2024 1200 by Jennifer Triplett RN  Safety Promotion/Fall Prevention:   activity supervised   assistive device/personal items within reach   clutter free environment maintained   lighting adjusted   nonskid shoes/slippers when out of bed  Taken 9/22/2024 1000 by Jennifer Triplett RN  Safety Promotion/Fall Prevention: safety round/check completed  Taken 9/22/2024 0800 by Jennifer Triplett RN  Safety Promotion/Fall Prevention: safety round/check completed   Goal Outcome Evaluation:   No acute events throughout shift. Tolerated clear liquids during shift. No complaints of pain throughout shift. Received magnesium and potassium replacement. BM x1. RA. Afib.  VSS. GI consulted. No complaints at this time. Call bell within reach.

## 2024-09-22 NOTE — CONSULTS
[facility] Cardiology  Consultation H&P    Patient: Nette Montalvo  1945    [address]  [facility] [place] [address]    PCP:  Carol Virgen APRN   Treatment Team:   Attending Provider: Betina Nuñez MD  Consulting Physician: Yoly Bar MD  Admitting Provider: Yoly Bar MD   9/21/2024    Primary cardiologist: Ryan Leavitt MD   Electrophysiologist Fran Powell MD.    DATE OF CONSULTATION: 9/22/2024 07:41 EDT     IDENTIFICATION: A 79 y.o. female     REASON FOR CONSULTATION: CHF/AF    PROBLEM LIST:    Symptomatic anemia    Anemia    Atrial fibrillation, persistent    CAD (coronary artery disease), native coronary artery    Essential hypertension    Problem List  Persistent atrial fibrillation  [id number], anticoagulated Eliquis  AAD: Failed amio  Echo, 12/2023: EF 50-55%, mild concentric LVH  Holter, 4/2024: 100% A-fib burden, average  bpm, max 148 bpm  Coronary artery disease  S/p remote CABG  Severe aortic stenosis  S/p TAVR, 2017  Hypertension  Hyperlipidemia  Type 2 diabetes  Hypothyroid    Allergies  Allergies   Allergen Reactions    Ceftin [Cefuroxime Axetil] Confusion    Contrast Dye (Echo Or Unknown Ct/Mr) Rash       Home Medications:  Current Outpatient Medications   Medication Instructions    acetaminophen (TYLENOL) 500 mg, Oral, Every 6 Hours PRN    apixaban (ELIQUIS) 5 mg, Oral, Every 12 Hours Scheduled    aspirin 81 mg, Oral, Daily    atorvastatin (LIPITOR) 40 mg, Oral, Daily    BIOTIN PO 10,000 mcg, Oral, Daily    cetirizine (ZYRTEC) 10 mg, Oral, Daily PRN    cholecalciferol (VITAMIN D3) 1,000 Units, Oral, Daily    furosemide (LASIX) 20 mg, Oral, Daily    levothyroxine (SYNTHROID, LEVOTHROID) 75 mcg, Oral, Daily    lisinopril (PRINIVIL,ZESTRIL) 10 mg, Oral, Daily    magnesium oxide (MAG-OX) 400 mg, Oral, Daily    metFORMIN ER (GLUCOPHAGE-XR) 500 mg, Oral, Daily With Breakfast    metoprolol succinate XL (TOPROL-XL) 50 mg, Oral, Daily     nitroglycerin (NITROSTAT) 0.4 mg, Sublingual, Every 5 Minutes PRN, Take no more than 3 doses in 15 minutes.    PARoxetine (PAXIL) 20 mg, Oral, Every Morning    potassium chloride (KLOR-CON M10) 10 MEQ CR tablet 10 mEq, Oral, Daily    topiramate (TOPAMAX) 50 mg, Oral, Every 12 Hours Scheduled    TRUE METRIX BLOOD GLUCOSE TEST test strip TEST BLOOD 3 TIMES A DAY    vitamin B-12 (CYANOCOBALAMIN) 1,000 mcg, Oral, Daily        Current Medications    Current Facility-Administered Medications:     acetaminophen (TYLENOL) tablet 650 mg, 650 mg, Oral, Q4H PRN **OR** acetaminophen (TYLENOL) 160 MG/5ML oral solution 650 mg, 650 mg, Oral, Q4H PRN **OR** acetaminophen (TYLENOL) suppository 650 mg, 650 mg, Rectal, Q4H PRN, Madhav Mccoy, APRN    [Held by provider] apixaban (ELIQUIS) tablet 5 mg, 5 mg, Oral, Q12H, Madhav Mccoy, APRN    aspirin EC tablet 81 mg, 81 mg, Oral, Daily, Madhav Mccoy, APRN    atorvastatin (LIPITOR) tablet 40 mg, 40 mg, Oral, Daily, Madhav Mccoy APRN, 40 mg at 09/21/24 2129    sennosides-docusate (PERICOLACE) 8.6-50 MG per tablet 2 tablet, 2 tablet, Oral, BID PRN **AND** polyethylene glycol (MIRALAX) packet 17 g, 17 g, Oral, Daily PRN **AND** bisacodyl (DULCOLAX) EC tablet 5 mg, 5 mg, Oral, Daily PRN **AND** bisacodyl (DULCOLAX) suppository 10 mg, 10 mg, Rectal, Daily PRN, Madhav Mccoy, APRN    Calcium Replacement - Follow Nurse / BPA Driven Protocol, , Does not apply, PRN, Madhav Mccoy, APRN    cholecalciferol (VITAMIN D3) tablet 1,000 Units, 1,000 Units, Oral, Daily, Madhav Mccoy, APRN, 1,000 Units at 09/21/24 2129    [Held by provider] furosemide (LASIX) tablet 20 mg, 20 mg, Oral, Daily, Madhav Mccoy APRN    levothyroxine (SYNTHROID, LEVOTHROID) tablet 75 mcg, 75 mcg, Oral, Daily, Madhav Mccoy APRN, 75 mcg at 09/22/24 0502    [Held by provider] lisinopril (PRINIVIL,ZESTRIL) tablet 10 mg, 10 mg, Oral, Daily, Madhav Mccoy APRN    Magnesium Standard Dose Replacement -  Follow Nurse / BPA Driven Protocol, , Does not apply, PRN, DariusVikiMadhav A, APRN    metoprolol succinate XL (TOPROL-XL) 24 hr tablet 25 mg, 25 mg, Oral, Daily, Darius, Madhav A, APRN, 25 mg at 09/21/24 2129    nitroglycerin (NITROSTAT) SL tablet 0.4 mg, 0.4 mg, Sublingual, Q5 Min PRN, Darius, Madhav A, APRN    ondansetron ODT (ZOFRAN-ODT) disintegrating tablet 4 mg, 4 mg, Oral, Q6H PRN **OR** ondansetron (ZOFRAN) injection 4 mg, 4 mg, Intravenous, Q6H PRN, Darius, Madhav A, APRN    pantoprazole (PROTONIX) injection 40 mg, 40 mg, Intravenous, BID AC, Darius, Madhav A, APRN, 40 mg at 09/21/24 2356    PARoxetine (PAXIL) tablet 20 mg, 20 mg, Oral, QAM, Darius, Madhav A, APRN    Phosphorus Replacement - Follow Nurse / BPA Driven Protocol, , Does not apply, PRN, Darius, Madhav A, APRN    Potassium Replacement - Follow Nurse / BPA Driven Protocol, , Does not apply, PRN, Darius Madhav A, APRN    [COMPLETED] Insert Peripheral IV, , , Once **AND** sodium chloride 0.9 % flush 10 mL, 10 mL, Intravenous, PRN, Darius, Madhav A, APRN    sodium chloride 0.9 % flush 10 mL, 10 mL, Intravenous, Q12H, Darius, Madhav A, APRN, 10 mL at 09/21/24 2357    sodium chloride 0.9 % flush 10 mL, 10 mL, Intravenous, PRN, Darius, Madhav A, APRN    sodium chloride 0.9 % infusion 40 mL, 40 mL, Intravenous, PRN, Darius, Madhav A, APRN    topiramate (TOPAMAX) tablet 50 mg, 50 mg, Oral, Q12H, Darius, Madhav A, APRN, 50 mg at 09/21/24 2129    vitamin B-12 (CYANOCOBALAMIN) tablet 1,000 mcg, 1,000 mcg, Oral, Daily, DariusMadhav APRN, 1,000 mcg at 09/21/24 2129       History of Present Illness   Nette Montalvo is a 79 y.o. female with past medical history significant for HFpEF, CAD s/p CABG, HTN, HLD, GERD, T2DM, and hypothyroidism who presented to the ED on 9/21 due to concern for anemia, lower extremity edema, and worsening fatigue.She was seen by her PCP on Friday and labs revealed HGB of 6. She was told by PCP Saturday AM to go to ER for evaluation. She  was diagnosed with afib earlier this year and started on Eliquis. HGB 7.5 on admission, s/p 1 unit PRBCs.  Eliquis on hold.  Cardiology has been consulted to address CHF and atrial fibrillation. Initial consult for AF in June 2024 with Dr. Powell and patient given the option of A-fib ablation versus AV node ablation/PPM.  Patient opted for medical management however did not tolerate diltiazem due to hair loss. She is currently taking metoprolol and is rate controlled with PVCs. No acute distress, no chest pain. She received IV furosemide and reports significant diuresis overnight. Currently on room air, family at bedside.        ROS  Review of Systems   Constitutional: Positive for malaise/fatigue.   Cardiovascular:  Positive for dyspnea on exertion and leg swelling. Negative for chest pain.   Respiratory:  Positive for shortness of breath.    Gastrointestinal:  Negative for heartburn, hematemesis, hematochezia and nausea.   Neurological:  Positive for weakness.       SOCIAL HX  Social History     Socioeconomic History    Marital status:    Tobacco Use    Smoking status: Never     Passive exposure: Never    Smokeless tobacco: Never   Vaping Use    Vaping status: Never Used   Substance and Sexual Activity    Alcohol use: No    Drug use: No    Sexual activity: Defer       FAMILY HX  Family History   Problem Relation Age of Onset    Cervical cancer Mother     Heart attack Father     Heart disease Father     Breast cancer Sister     Heart disease Other     Diabetes Other     Cancer Other     Colon cancer Neg Hx        OBJECTIVE:  Vitals:    09/21/24 2300 09/21/24 2337 09/22/24 0356 09/22/24 0534   BP: 168/86 168/86 161/61    BP Location:  Left arm Left arm    Patient Position:  Lying Lying    Pulse:  80 64    Resp: 16 14 14    Temp: 98.8 °F (37.1 °C) 98.8 °F (37.1 °C) 98.2 °F (36.8 °C)    TempSrc: Oral Oral Oral    SpO2:       Weight:    70.8 kg (156 lb)   Height:         I/O last 3 completed shifts:  In: 377.1  [Blood:377.1]  Out: -   No intake/output data recorded.  Intake & Output (last 3 days)         09/19 0701 09/20 0700 09/20 0701 09/21 0700 09/21 0701 09/22 0700 09/22 0701 09/23 0700    Blood   377.1     Total Intake(mL/kg)   377.1 (5.3)     Net   +377.1             Urine Unmeasured Occurrence   4 x              PHYSICAL EXAMINATION:  Vitals reviewed.   Constitutional:       General: Not in acute distress.     Appearance: Frail.   Pulmonary:      Effort: Pulmonary effort is normal.      Breath sounds: Normal breath sounds.   Cardiovascular:      Bradycardia present. Frequent ectopic beats. Irregularly irregular rhythm. Normal S1. Normal S2.       Murmurs: There is a systolic murmur.   Pulses:     Intact distal pulses.   Edema:     Pretibial: trace edema of the left pretibial area and 1+ edema of the right pretibial area.  Abdominal:      General: Bowel sounds are normal.      Palpations: Abdomen is soft.      Tenderness: There is no abdominal tenderness.   Skin:     General: Skin is warm and dry.   Neurological:      General: No focal deficit present.      Mental Status: Alert.   Psychiatric:         Behavior: Behavior is cooperative.         Diagnostic Data:  Lab Results (last 24 hours)       Procedure Component Value Units Date/Time    CBC Auto Differential [[id number]]  (Abnormal) Collected: 09/22/24 0629    Specimen: Blood Updated: 09/22/24 0725     WBC 6.38 10*3/mm3      RBC 3.56 10*6/mm3      Hemoglobin 8.3 g/dL      Hematocrit 28.0 %      MCV 78.7 fL      MCH 23.3 pg      MCHC 29.6 g/dL      RDW 16.5 %      RDW-SD 47.0 fl      MPV 12.0 fL      Platelets 160 10*3/mm3      Neutrophil % 68.2 %      Lymphocyte % 21.0 %      Monocyte % 8.0 %      Eosinophil % 1.7 %      Basophil % 0.5 %      Immature Grans % 0.6 %      Neutrophils, Absolute 4.35 10*3/mm3      Lymphocytes, Absolute 1.34 10*3/mm3      Monocytes, Absolute 0.51 10*3/mm3      Eosinophils, Absolute 0.11 10*3/mm3      Basophils, Absolute 0.03 10*3/mm3       Immature Grans, Absolute 0.04 10*3/mm3      nRBC 0.0 /100 WBC     TSH [[id number]]  (Normal) Collected: 09/22/24 0629    Specimen: Blood Updated: 09/22/24 0723     TSH 3.800 uIU/mL     Comprehensive Metabolic Panel [[id number]]  (Abnormal) Collected: 09/22/24 0629    Specimen: Blood Updated: 09/22/24 0720     Glucose 128 mg/dL      BUN 16 mg/dL      Creatinine 1.17 mg/dL      Sodium 141 mmol/L      Potassium 3.4 mmol/L      Chloride 104 mmol/L      CO2 27.0 mmol/L      Calcium 8.8 mg/dL      Total Protein 5.7 g/dL      Albumin 3.4 g/dL      ALT (SGPT) 7 U/L      AST (SGOT) 18 U/L      Alkaline Phosphatase 67 U/L      Total Bilirubin 1.0 mg/dL      Globulin 2.3 gm/dL      Comment: Calculated Result        A/G Ratio 1.5 g/dL      BUN/Creatinine Ratio 13.7     Anion Gap 10.0 mmol/L      eGFR 47.0 mL/min/1.73     Narrative:      GFR Normal >60  Chronic Kidney Disease <60  Kidney Failure <15    The GFR formula is only valid for adults with stable renal function between ages 18 and 70.    Magnesium [[id number]]  (Abnormal) Collected: 09/22/24 0629    Specimen: Blood Updated: 09/22/24 0720     Magnesium 1.5 mg/dL     Hemoglobin A1c [[id number]]  (Abnormal) Collected: 09/22/24 0629    Specimen: Blood Updated: 09/22/24 0718     Hemoglobin A1C 5.70 %     Narrative:      Hemoglobin A1C Ranges:    Increased Risk for Diabetes  5.7% to 6.4%  Diabetes                     >= 6.5%  Diabetic Goal                < 7.0%    CBC & Differential [[id number]]  (Abnormal) Collected: 09/21/24 1334    Specimen: Blood Updated: 09/21/24 1424    Narrative:      The following orders were created for panel order CBC & Differential.  Procedure                               Abnormality         Status                     ---------                               -----------         ------                     CBC Auto Differential[[id number]]        Abnormal            Final result                 Please view results for these tests on the individual  orders.    CBC Auto Differential [[id number]]  (Abnormal) Collected: 09/21/24 1334    Specimen: Blood Updated: 09/21/24 1424     WBC 6.89 10*3/mm3      RBC 3.37 10*6/mm3      Hemoglobin 7.5 g/dL      Hematocrit 27.2 %      MCV 80.7 fL      MCH 22.3 pg      MCHC 27.6 g/dL      RDW 17.1 %      RDW-SD 50.0 fl      MPV 12.0 fL      Platelets 194 10*3/mm3      Neutrophil % 70.7 %      Lymphocyte % 20.6 %      Monocyte % 6.1 %      Eosinophil % 1.6 %      Basophil % 0.6 %      Immature Grans % 0.4 %      Neutrophils, Absolute 4.87 10*3/mm3      Lymphocytes, Absolute 1.42 10*3/mm3      Monocytes, Absolute 0.42 10*3/mm3      Eosinophils, Absolute 0.11 10*3/mm3      Basophils, Absolute 0.04 10*3/mm3      Immature Grans, Absolute 0.03 10*3/mm3      nRBC 0.0 /100 WBC     Protime-INR [[id number]]  (Abnormal) Collected: 09/21/24 1334    Specimen: Blood Updated: 09/21/24 1420     Protime 15.9 Seconds      INR 1.26    aPTT [[id number]]  (Abnormal) Collected: 09/21/24 1334    Specimen: Blood Updated: 09/21/24 1420     PTT 37.1 seconds     Narrative:      PTT = The equivalent PTT values for the therapeutic range of heparin levels at 0.3 to 0.5 U/ml are 60 to 70 seconds.    Comprehensive Metabolic Panel [[id number]]  (Abnormal) Collected: 09/21/24 1334    Specimen: Blood Updated: 09/21/24 1412     Glucose 110 mg/dL      BUN 17 mg/dL      Creatinine 1.19 mg/dL      Sodium 143 mmol/L      Potassium 3.9 mmol/L      Comment: Slight hemolysis detected by analyzer. Result may be falsely elevated.        Chloride 107 mmol/L      CO2 24.0 mmol/L      Calcium 9.1 mg/dL      Total Protein 6.2 g/dL      Albumin 3.8 g/dL      ALT (SGPT) 9 U/L      AST (SGOT) 21 U/L      Alkaline Phosphatase 86 U/L      Total Bilirubin 0.4 mg/dL      Globulin 2.4 gm/dL      Comment: Calculated Result        A/G Ratio 1.6 g/dL      BUN/Creatinine Ratio 14.3     Anion Gap 12.0 mmol/L      eGFR 46.0 mL/min/1.73     Narrative:      GFR Normal >60  Chronic Kidney Disease  <60  Kidney Failure <15    The GFR formula is only valid for adults with stable renal function between ages 18 and 70.    POC Occult Blood Stool [[id number]]  (Abnormal) Resulted: 09/21/24 1409    Specimen: Stool from Per Rectum Updated: 09/21/24 1410     Fecal Occult Blood Positive     Lot Number [id number]     Expiration Date 11-26     DEVELOPER LOT NUMBER [id number]     DEVELOPER EXPIRATION DATE 2,027-8     Positive Control Positive     Negative Control Negative          ECG/EMG Results (last 24 hours)       Procedure Component Value Units Date/Time    Telemetry Scan [[id number]] Resulted: 09/21/24     Updated: 09/22/24 0605               ASSESSMENT/PLAN:    Symptomatic anemia    Anemia    Atrial fibrillation, persistent    CAD (coronary artery disease), native coronary artery    Essential hypertension    Symptomatic anemia  Hgb 7.5 on admission, 8.3 after 1 unit PRBC  -Eliquis on hold  -GI eval pending    Persistent atrial fibrillation, currently rate controlled with PVCs  -[id number] 5, Eliquis on hold  -Holter, 4/2024: 100% A-fib burden, average heart rate 103  -Amiodarone failed. Cardizem not tolerated due to hair loss  -Continue low dose metoprolol  -Keep hemoglobin > 8, K > 4  -Replace electrolytes per protocol  -After anemia addressed, likely outpatient, consider A-fib ablation versus AV node ablation/PPM +/- Watchman per Dr. Powell  -Dr. Powell to see in AM     Coronary artery disease  -S/p remote CABG  -Denies chest pain  -continue aspirin and statin  -Follows with Dr. Leavitt, in [place]    Aortic stenosis, s/p TAVR 2017  -Echo 12/2023: LVEF 50-55%, normal LV function, functioning TAVR, RVSP 44 mmHg  -appears euvolemic after IV lasix, output not recorded  -Echo pending  -Strict I&Os    HTN  -Lisinopril on hold       Scarlet Loera, JOHN. 9/22/2024  07:41 EDT

## 2024-09-22 NOTE — CONSULTS
[facility]:  Inpatient Gastroenterology Consult      Inpatient Gastroenterology Consult  Consult performed by: Kel Javed APRN  Consult ordered by: Madhav Mccoy APRN  Reason for consult: Symptomatic anemia, concern for GI bleed      Referring Provider: No ref. provider found    PCP: Carol Virgen APRN    Chief Complaint: Weakness    History of present illness:  Nette Montalvo is a 79 y.o. female who is admitted with worsening weakness and fatigue.  Patient had outpatient labs per her PCP which found worsening anemia prompting her referral by PCP to ER for further evaluation.  Patient notes that for the past few weeks she has had worsening weakness and fatigue as well as volume overload has been seeing her PCP frequently to try and obtain symptom relief.  Patient does not report any issues with melena, hematochezia, or hematemesis.  Does occasionally have small amount of blood on toilet tissue when wiping but attributes that to her known rectal prolapse.  Does not endorse any N/V/D, abdominal pain, SOB, CP, or F/C.  Does not use NSAIDs secondary to underlying cardiac disease history.  Is anticoagulated on Eliquis chronically.  Had colonoscopy and EGD per Dr. Scarlet Colon as noted below; colon prep adequate and high quality exam. Past medical, surgical, social, and family histories are reviewed for accuracy.  No documented alleviating or exacerbating factors.  Does not endorse pain at time of exam.    COLONOSCOPY (06/10/2022 09:59)  UPPER GI ENDOSCOPY (06/10/2022 09:29)    Allergies:  Ceftin [cefuroxime axetil] and Contrast dye (echo or unknown ct/mr)    Scheduled Meds:  [Held by provider] apixaban, 5 mg, Oral, Q12H  [Held by provider] aspirin, 81 mg, Oral, Daily  atorvastatin, 40 mg, Oral, Nightly  cholecalciferol, 1,000 Units, Oral, Daily  [Held by provider] furosemide, 20 mg, Oral, Daily  levothyroxine, 75 mcg, Oral, Daily  losartan, 50 mg, Oral, Q24H  metoprolol  succinate XL, 25 mg, Oral, Daily  pantoprazole, 40 mg, Intravenous, BID AC  PARoxetine, 20 mg, Oral, QAM  sodium chloride, 10 mL, Intravenous, Q12H  topiramate, 50 mg, Oral, Q12H  vitamin B-12, 1,000 mcg, Oral, Daily         Infusions:       PRN Meds:    acetaminophen **OR** acetaminophen **OR** acetaminophen    senna-docusate sodium **AND** polyethylene glycol **AND** bisacodyl **AND** bisacodyl    Calcium Replacement - Follow Nurse / BPA Driven Protocol    Magnesium Standard Dose Replacement - Follow Nurse / BPA Driven Protocol    nitroglycerin    ondansetron ODT **OR** ondansetron    Phosphorus Replacement - Follow Nurse / BPA Driven Protocol    Potassium Replacement - Follow Nurse / BPA Driven Protocol    [COMPLETED] Insert Peripheral IV **AND** sodium chloride    sodium chloride    sodium chloride    Home Meds:  Medications Prior to Admission   Medication Sig Dispense Refill Last Dose    acetaminophen (TYLENOL) 500 MG tablet Take 1 tablet by mouth Every 6 (Six) Hours As Needed for Mild Pain.   Past Week    apixaban (ELIQUIS) 5 MG tablet tablet Take 1 tablet by mouth Every 12 (Twelve) Hours. 60 tablet 0 9/20/2024    aspirin 81 MG EC tablet Take 1 tablet by mouth Daily.   9/20/2024    atorvastatin (LIPITOR) 40 MG tablet Take 1 tablet by mouth Daily.   9/20/2024    BIOTIN PO Take 10,000 mcg by mouth Daily.   9/20/2024    cetirizine (zyrTEC) 10 MG tablet Take 1 tablet by mouth Daily As Needed.   Past Week    Cholecalciferol (VITAMIN D) 1000 units tablet Take 1 tablet by mouth Daily.   9/20/2024    furosemide (LASIX) 20 MG tablet Take 1 tablet by mouth Daily.   9/20/2024    levothyroxine (SYNTHROID, LEVOTHROID) 75 MCG tablet Take 1 tablet by mouth Daily.   9/20/2024    lisinopril (PRINIVIL,ZESTRIL) 10 MG tablet Take 1 tablet by mouth Daily.   9/20/2024    magnesium oxide (MAG-OX) 400 MG tablet Take 1 tablet by mouth Daily. 30 tablet 0 9/20/2024    metFORMIN ER (GLUCOPHAGE-XR) 500 MG 24 hr tablet Take 1 tablet by  mouth Daily With Breakfast.   9/20/2024    metoprolol succinate XL (TOPROL-XL) 50 MG 24 hr tablet Take 1 tablet by mouth Daily. (Patient taking differently: Take 0.5 tablets by mouth Daily.) 30 tablet 6 9/20/2024    PARoxetine (PAXIL) 20 MG tablet Take 1 tablet by mouth Every Morning.   9/20/2024    potassium chloride (KLOR-CON M10) 10 MEQ CR tablet Take 1 tablet by mouth Daily.   9/20/2024    topiramate (TOPAMAX) 50 MG tablet Take 1 tablet by mouth Every 12 (Twelve) Hours. 60 tablet 1 9/20/2024    vitamin B-12 (CYANOCOBALAMIN) 1000 MCG tablet Take 1 tablet by mouth Daily.   9/20/2024    nitroglycerin (NITROSTAT) 0.4 MG SL tablet Place 1 tablet under the tongue Every 5 (Five) Minutes As Needed for Chest Pain. Take no more than 3 doses in 15 minutes.   More than a month    TRUE METRIX BLOOD GLUCOSE TEST test strip TEST BLOOD 3 TIMES A DAY  5        ROS: Review of Systems   Constitutional:  Positive for activity change and fatigue. Negative for appetite change, chills, diaphoresis, fever and unexpected weight change.   HENT:  Negative for sore throat, trouble swallowing and voice change.    Eyes: Negative.    Respiratory:  Negative for apnea, cough, choking, chest tightness, shortness of breath, wheezing and stridor.    Cardiovascular:  Negative for chest pain, palpitations and leg swelling.   Gastrointestinal:  Positive for anal bleeding, blood in stool and constipation. Negative for abdominal distention, abdominal pain, diarrhea, nausea, rectal pain and vomiting.   Endocrine: Negative.    Genitourinary: Negative.    Musculoskeletal: Negative.    Skin:  Positive for pallor.   Allergic/Immunologic: Negative.    Neurological: Negative.    Hematological: Negative.    Psychiatric/Behavioral: Negative.     All other systems reviewed and are negative.      PAST MED HX:  Past Medical History:   Diagnosis Date    Anxiety     Arthritis     Cataract, bilateral     BILATERAL    Chest pain     none recent    CHF (congestive  heart failure)     Coronary artery disease     Depression     Diabetes mellitus     Disease of thyroid gland     Fracture     RIGHT SHOULDER FX, LEFT ANKLE FRACTURE    Full dentures     GERD (gastroesophageal reflux disease)     Heart disease     High cholesterol     History of nuclear stress test 2016    Hypertension     Migraine     Myocardial infarction 2006    s/p CABG x 4    Pneumonia     PONV (postoperative nausea and vomiting)     Seasonal allergies     Seizure     DAUGHTER STATED POSSIBLE SEIZURES.  STATES POSSIBLE MIGRANES/SEIZURES    Skin cancer     face and feet    Wears glasses        PAST SURG HX:  Past Surgical History:   Procedure Laterality Date    APPENDECTOMY      CARDIAC CATHETERIZATION      CATARACT EXTRACTION W/ INTRAOCULAR LENS IMPLANT Left 09/12/2017    Procedure: CATARACT PHACO EXTRACTION WITH INTRAOCULAR LENS IMPLANT LEFT;  Surgeon: Judy Clifton MD;  Location: [facility] OR;  Service:     CATARACT EXTRACTION W/ INTRAOCULAR LENS IMPLANT Right 08/14/2018    Procedure: CATARACT PHACO EXTRACTION WITH INTRAOCULAR LENS IMPLANT RIGHT;  Surgeon: Judy Clifton MD;  Location: [facility] OR;  Service: Ophthalmology    COLONOSCOPY N/A 6/10/2022    Procedure: COLONOSCOPY;  Surgeon: Susan Colon MD;  Location: [facility] ENDOSCOPY;  Service: Gastroenterology;  Laterality: N/A;    CORONARY ARTERY BYPASS GRAFT  2006    CABG x 4    ENDOSCOPY N/A 6/10/2022    Procedure: ESOPHAGOGASTRODUODENOSCOPY WITH BIOPSY;  Surgeon: Susan Colon MD;  Location: [facility] ENDOSCOPY;  Service: Gastroenterology;  Laterality: N/A;    SKIN BIOPSY      SKIN CANCER EXCISION      face and foot    TISSUE AORTIC VALVE REPLACEMENT  2017    bovine valve    TOTAL ABDOMINAL HYSTERECTOMY WITH SALPINGO OOPHORECTOMY         FAM HX:  Family History   Problem Relation Age of Onset    Cervical cancer Mother     Heart attack Father     Heart disease Father     Breast cancer Sister     Heart disease Other     Diabetes Other   "   Cancer Other     Colon cancer Neg Hx        SOC HX:  Social History     Socioeconomic History    Marital status:    Tobacco Use    Smoking status: Never     Passive exposure: Never    Smokeless tobacco: Never   Vaping Use    Vaping status: Never Used   Substance and Sexual Activity    Alcohol use: No    Drug use: No    Sexual activity: Defer       PHYSICAL EXAM  /69 (BP Location: Left arm, Patient Position: Lying)   Pulse 66   Temp 98.8 °F (37.1 °C) (Oral)   Resp 16   Ht 160 cm (63\")   Wt 70.8 kg (156 lb)   SpO2 96%   BMI 27.63 kg/m²   Wt Readings from Last 3 Encounters:   09/22/24 70.8 kg (156 lb)   06/06/24 67.1 kg (148 lb)   05/09/24 66.7 kg (147 lb)   ,body mass index is 27.63 kg/m².  Physical Exam  Vitals and nursing note reviewed.   Constitutional:       General: She is not in acute distress.     Appearance: Normal appearance. She is normal weight. She is not ill-appearing or toxic-appearing.   HENT:      Head: Normocephalic and atraumatic.   Eyes:      General: No scleral icterus.     Extraocular Movements: Extraocular movements intact.      Conjunctiva/sclera: Conjunctivae normal.      Pupils: Pupils are equal, round, and reactive to light.   Cardiovascular:      Rate and Rhythm: Normal rate and regular rhythm.      Pulses: Normal pulses.      Heart sounds: Normal heart sounds.   Pulmonary:      Effort: Pulmonary effort is normal. No respiratory distress.      Breath sounds: Normal breath sounds.   Abdominal:      General: Abdomen is flat. Bowel sounds are normal. There is no distension.      Palpations: Abdomen is soft. There is no mass.      Tenderness: There is no abdominal tenderness. There is no guarding or rebound.      Hernia: No hernia is present.   Genitourinary:     Rectum: Guaiac result positive.   Musculoskeletal:         General: Normal range of motion.      Right lower leg: Edema present.      Left lower leg: Edema present.   Skin:     General: Skin is warm and dry.      " Capillary Refill: Capillary refill takes less than 2 seconds.      Coloration: Skin is pale. Skin is not jaundiced.   Neurological:      General: No focal deficit present.      Mental Status: She is alert and oriented to person, place, and time.   Psychiatric:         Mood and Affect: Mood normal.         Behavior: Behavior normal.         Thought Content: Thought content normal.         Judgment: Judgment normal.         Results Review:   I reviewed the patient's new clinical results.  I reviewed the patient's new imaging results and agree with the interpretation.  I reviewed the patient's other test results and agree with the interpretation  I personally viewed and interpreted the patient's EKG/Telemetry data    Lab Results   Component Value Date    WBC 6.38 09/22/2024    HGB 8.3 (L) 09/22/2024    HGB 7.5 (L) 09/21/2024    HGB 9.9 (L) 09/09/2023    HCT 28.0 (L) 09/22/2024    MCV 78.7 (L) 09/22/2024     09/22/2024       Lab Results   Component Value Date    INR 1.26 (H) 09/21/2024    INR 1.0 03/24/2017       Lab Results   Component Value Date    GLUCOSE 128 (H) 09/22/2024    BUN 16 09/22/2024    CREATININE 1.17 (H) 09/22/2024    EGFRIFNONA 82 03/24/2017    BCR 13.7 09/22/2024     09/22/2024    K 4.2 09/22/2024    CO2 27.0 09/22/2024    CALCIUM 8.8 09/22/2024    ALBUMIN 3.4 (L) 09/22/2024    ALKPHOS 67 09/22/2024    BILITOT 1.0 09/22/2024    ALT 7 09/22/2024    AST 18 09/22/2024       Adult Transthoracic Echo Complete W/ Cont if Necessary Per Protocol    Result Date: 9/22/2024    Left ventricular systolic function is normal. Calculated left ventricular EF = 57.6% Left ventricular ejection fraction appears to be 56 - 60%.   Left ventricular diastolic function was indeterminate.   Moderately reduced right ventricular systolic function noted.   The right ventricular cavity is moderately dilated.   The left atrial cavity is moderately dilated.   The right atrial cavity is moderately  dilated.   There is a  TAVR valve present. Mild paravalvular regurgitation is present in the prosthetic aortic valve.   Severe tricuspid valve regurgitation is present.   Estimated right ventricular systolic pressure from tricuspid regurgitation is mildly elevated (35-45 mmHg).   Mild pulmonary hypertension is present.     US Renal Bilateral    Result Date: 9/22/2024  US RENAL BILATERAL Date of Exam: 9/22/2024 11:33 AM EDT Indication: concern for exphytic lesion. Comparison: CT abdomen pelvis September 21, 2024 Technique: Grayscale and color Doppler ultrasound evaluation of the kidneys and urinary bladder was performed. Findings: Right kidney: 10.4 cm in length. Echogenicity does not appear unusual. There is no hydronephrosis. It looks like there are probably parapelvic renal cysts. Left kidney: There is a suggested cyst off the lower pole of the left kidney measuring 1.6 cm which could account for the finding noted on the CT. There is suggestion of some parapelvic renal cysts. Echogenicity kidney does not appear unusual. Bladder: The bladder does not appear unusual for the degree of distention.     Impression: 1.Suggested cyst lower pole left kidney. 2.Parapelvic renal cysts bilaterally suggested. Electronically Signed: Montana Guy MD  9/22/2024 1:40 PM EDT  Workstation ID: [id number]    CT Abdomen Pelvis Without Contrast    Result Date: 9/21/2024  CT ABDOMEN PELVIS WO CONTRAST Date of Exam: 9/21/2024 3:10 PM EDT Indication: GI BLEEDING. Anemia. Comparison: None available. Technique: Axial CT images were obtained of the abdomen and pelvis without the administration of contrast. Reconstructed coronal and sagittal images were also obtained. Automated exposure control and iterative construction methods were used. Findings: There are multiple pulmonary nodules in the lung bases that appears similar to the chest CT of 9/9/2023 allowing for motion on both examinations. For example, there is a groundglass nodule in the right lower lobe measuring  8 to 9 mm on image 5. There is a left lower lobe nodule measuring 6 to 7 mm on image 24. An area of nodular consolidation in the medial right middle lobe measures about 11 mm on image 3, previously about 13 mm. Multiple smaller nodules are noted throughout both lung bases. Attention on 6-month follow-up chest CT is recommended. Heart is enlarged. There is coronary artery disease, and the patient is status post TAVR. There is a trace amount of right pleural fluid now noted. There is diffuse atherosclerotic disease with no aortic aneurysm. Anasarca is noted. This is particularly evident in the lower ventral abdominal wall. Correlate clinically to exclude cellulitis. Gallbladder contains stones. There is no biliary obstruction. There is increased density of the bilateral renal pyramids which may reflect nephrocalcinosis. There is an exophytic lesion arising from the lower pole of the left kidney measuring 1.6 cm. Nonemergent evaluation with renal ultrasound is recommended to exclude a mass. No ureteral stones or hydronephrosis on either side. There are parapelvic cysts in both kidneys. The unenhanced solid abdominal organs are otherwise normal. Urinary bladder is normal. Uterus is surgically absent. The appendix is surgically absent. Large bowel is normal without evidence of colitis. There is no small bowel obstruction identified. The stomach is normal. No free fluid or adenopathy is identified. There is diffuse degenerative disease in the lower thoracic spine and in the lumbar spine.     1. Nodules in the lung bases as above are largely stable from a prior chest CT. There is a trace amount of right pleural fluid today. Attention on 6-month follow-up chest CT is recommended. 2. Evidence of nephrocalcinosis with no ureteral stones or hydronephrosis on either side. 3. Determinate exophytic left renal lesion. Follow-up with ultrasound is recommended to exclude a mass. 4. Cholelithiasis. 5. Appendectomy. No acute findings  in the GI tract. 6. Anasarca with fairly intense fat stranding in the ventral lower abdominal wall. Correlate clinically to exclude cellulitis. 7. Additional nonacute findings as above. Electronically Signed: Mike Luog MD  9/21/2024 3:30 PM EDT  Workstation ID: [id number]     ASSESSMENTS/PLANS  1.  Gastrointestinal bleeding, likely secondary to known rectal prolapse  2.  Mao's esophagus  3.  Acute on chronic anemia, symptomatic anemia  4.  HFpEF, PAF, Eliquis  5.  History of aortic stenosis, history of TAVR.  6.  Rectal prolapse    Nette Montalvo is a 79 y.o. female who presents to hospital as transfer for symptomatic anemia.  Overall, favor chronic anemia secondary to chronic blood losses from known rectal prolapse as patient sees blood in stool and on toilet tissue weekly.  Of concern, patient's EGD in 2022 was significant for Mao's esophagus and she has had no further screening or a truly diagnostic Mao's EGD in the interim.  Given her anemia and symptoms, recommend EGD in a.m. for further evaluation.  As noted above, patient had a high quality colonoscopy in 2022 without occult lesion, polyps, or diverticula.    >>> N.p.o. midnight  >>> EGD in a.m.  >>> IV PPI twice daily  >>> Monitor H&H and transfuse per protocol  >>> Patient will need supplemental iron for chronic losses  >>> Anticoagulation has been held for planned endoscopic procedure  >>> Patient would benefit from referral to colorectal surgery for further evaluation and management of known rectal prolapse.    I discussed the patient's findings and my recommendations with patient, family, and consulting provider    JOHN Wilson  09/22/24  18:55 EDT

## 2024-09-22 NOTE — PROGRESS NOTES
[facility] Medicine Services  PROGRESS NOTE    Patient Name: Nette Montalvo  : 1945  MRN: [id number]    Date of Admission: 2024  Primary Care Physician: Carol Virgen APRN    Subjective   Subjective     CC:  F/U for anemia and weakness    HPI:  Patient was seen and examined this morning.  Feeling slightly better today.  Less short of breath.  Did not sleep overnight because she kept going to the bathroom to urinate after she received diuretic therapy yesterday.  Currently n.p.o. pending GI eval      Objective   Objective     Vital Signs:   Temp:  [98.2 °F (36.8 °C)-99.2 °F (37.3 °C)] 98.5 °F (36.9 °C)  Heart Rate:  [59-84] 59  Resp:  [14-18] 16  BP: (158-180)/(61-87) 164/71     Physical Exam:  General: Comfortable, not in distress, conversant and cooperative  Head: Atraumatic and normocephalic  Eyes: No Icterus. No pallor  Ears:  Ears appear intact with no abnormalities noted  Throat: No oral lesions, no thrush  Neck: Supple, trachea midline  Lungs: Clear to auscultation bilaterally, equal air entry, no wheezing or crackles  Heart:  Normal S1 and S2, no murmur, no gallop, No JVD, 1+ lower extremity swelling  Abdomen:  Soft, no tenderness, no organomegaly, normal bowel sounds, no organomegaly  Extremities: pulses equal bilaterally  Skin: No bleeding, bruising or rash, normal skin turgor and elasticity  Neurologic: Cranial nerves appear intact with no evidence of facial asymmetry, normal motor and sensory functions in all 4 extremities  Psych: Alert and oriented x 3, normal mood    Results Reviewed:  LAB RESULTS:      Lab 24  0629 24  1334   WBC 6.38 6.89   HEMOGLOBIN 8.3* 7.5*   HEMATOCRIT 28.0* 27.2*   PLATELETS 160 194   NEUTROS ABS 4.35 4.87   IMMATURE GRANS (ABS) 0.04 0.03   LYMPHS ABS 1.34 1.42   MONOS ABS 0.51 0.42   EOS ABS 0.11 0.11   MCV 78.7* 80.7   PROTIME  --  15.9*   APTT  --  37.1*         Lab 24  0629 24  1334   SODIUM 141 143    POTASSIUM 3.4* 3.9   CHLORIDE 104 107   CO2 27.0 24.0   ANION GAP 10.0 12.0   BUN 16 17   CREATININE 1.17* 1.19*   EGFR 47.0* 46.0*   GLUCOSE 128* 110*   CALCIUM 8.8 9.1   MAGNESIUM 1.5*  --    HEMOGLOBIN A1C 5.70*  --    TSH 3.800  --          Lab 09/22/24  0629 09/21/24  1334   TOTAL PROTEIN 5.7* 6.2   ALBUMIN 3.4* 3.8   GLOBULIN 2.3 2.4   ALT (SGPT) 7 9   AST (SGOT) 18 21   BILIRUBIN 1.0 0.4   ALK PHOS 67 86         Lab 09/21/24  1334   PROTIME 15.9*   INR 1.26*             Lab 09/21/24  1455   ABO TYPING O   RH TYPING Positive   ANTIBODY SCREEN Positive         Brief Urine Lab Results       None            Microbiology Results Abnormal       None            CT Abdomen Pelvis Without Contrast    Result Date: 9/21/2024  CT ABDOMEN PELVIS WO CONTRAST Date of Exam: 9/21/2024 3:10 PM EDT Indication: GI BLEEDING. Anemia. Comparison: None available. Technique: Axial CT images were obtained of the abdomen and pelvis without the administration of contrast. Reconstructed coronal and sagittal images were also obtained. Automated exposure control and iterative construction methods were used. Findings: There are multiple pulmonary nodules in the lung bases that appears similar to the chest CT of 9/9/2023 allowing for motion on both examinations. For example, there is a groundglass nodule in the right lower lobe measuring 8 to 9 mm on image 5. There is a left lower lobe nodule measuring 6 to 7 mm on image 24. An area of nodular consolidation in the medial right middle lobe measures about 11 mm on image 3, previously about 13 mm. Multiple smaller nodules are noted throughout both lung bases. Attention on 6-month follow-up chest CT is recommended. Heart is enlarged. There is coronary artery disease, and the patient is status post TAVR. There is a trace amount of right pleural fluid now noted. There is diffuse atherosclerotic disease with no aortic aneurysm. Anasarca is noted. This is particularly evident in the lower ventral  abdominal wall. Correlate clinically to exclude cellulitis. Gallbladder contains stones. There is no biliary obstruction. There is increased density of the bilateral renal pyramids which may reflect nephrocalcinosis. There is an exophytic lesion arising from the lower pole of the left kidney measuring 1.6 cm. Nonemergent evaluation with renal ultrasound is recommended to exclude a mass. No ureteral stones or hydronephrosis on either side. There are parapelvic cysts in both kidneys. The unenhanced solid abdominal organs are otherwise normal. Urinary bladder is normal. Uterus is surgically absent. The appendix is surgically absent. Large bowel is normal without evidence of colitis. There is no small bowel obstruction identified. The stomach is normal. No free fluid or adenopathy is identified. There is diffuse degenerative disease in the lower thoracic spine and in the lumbar spine.     Impression: 1. Nodules in the lung bases as above are largely stable from a prior chest CT. There is a trace amount of right pleural fluid today. Attention on 6-month follow-up chest CT is recommended. 2. Evidence of nephrocalcinosis with no ureteral stones or hydronephrosis on either side. 3. Determinate exophytic left renal lesion. Follow-up with ultrasound is recommended to exclude a mass. 4. Cholelithiasis. 5. Appendectomy. No acute findings in the GI tract. 6. Anasarca with fairly intense fat stranding in the ventral lower abdominal wall. Correlate clinically to exclude cellulitis. 7. Additional nonacute findings as above. Electronically Signed: Mike Lugo MD  9/21/2024 3:30 PM EDT  Workstation ID: [id number]     Results for orders placed in visit on 12/13/23    Adult Transthoracic Echo Complete W/ Cont if Necessary Per Protocol    Interpretation Summary  1.  Normal left ventricular size and systolic function, LVEF 50-55%.  2.  Mild concentric LVH.  3.  Normal LV diastolic filling pattern.  4.  Normal right ventricular size and  systolic function.  5.  Severely increased left atrial volume index.  6.  Status post TAVR with adequate functioning valve (mean gradient 15 mmHg).  7.  Mild tricuspid regurgitation, RVSP 44 mmHg.      Current medications:  Scheduled Meds:[Held by provider] apixaban, 5 mg, Oral, Q12H  [Held by provider] aspirin, 81 mg, Oral, Daily  atorvastatin, 40 mg, Oral, Nightly  cholecalciferol, 1,000 Units, Oral, Daily  [Held by provider] furosemide, 20 mg, Oral, Daily  levothyroxine, 75 mcg, Oral, Daily  losartan, 50 mg, Oral, Q24H  magnesium sulfate, 2 g, Intravenous, Q2H  metoprolol succinate XL, 25 mg, Oral, Daily  pantoprazole, 40 mg, Intravenous, BID AC  PARoxetine, 20 mg, Oral, QAM  sodium chloride, 10 mL, Intravenous, Q12H  topiramate, 50 mg, Oral, Q12H  vitamin B-12, 1,000 mcg, Oral, Daily      Continuous Infusions:   PRN Meds:.  acetaminophen **OR** acetaminophen **OR** acetaminophen    senna-docusate sodium **AND** polyethylene glycol **AND** bisacodyl **AND** bisacodyl    Calcium Replacement - Follow Nurse / BPA Driven Protocol    Magnesium Standard Dose Replacement - Follow Nurse / BPA Driven Protocol    nitroglycerin    ondansetron ODT **OR** ondansetron    Phosphorus Replacement - Follow Nurse / BPA Driven Protocol    Potassium Replacement - Follow Nurse / BPA Driven Protocol    [COMPLETED] Insert Peripheral IV **AND** sodium chloride    sodium chloride    sodium chloride    Assessment & Plan   Assessment & Plan     Active Hospital Problems    Diagnosis  POA    **Symptomatic anemia [D64.9]  Yes    Atrial fibrillation, persistent [I48.19]  Yes    CAD (coronary artery disease), native coronary artery [I25.10]  Yes    Essential hypertension [I10]  Yes    Anemia [D64.9]  Yes      Resolved Hospital Problems   No resolved problems to display.        Brief Hospital Course to date:  Nette Montalvo is a 79 y.o. female with past medical history of coronary artery disease status post CABG, essential hypertension,  dyslipidemia, type 2 diabetes, GERD, diastolic heart failure, hypothyroidism, recently started on Eliquis for A-fib and follows with Dr. Powell/cardiology presented to the hospital with symptomatic anemia with hemoglobin of 6.9 and decompensated diastolic heart failure    Acute blood loss anemia secondary to GI bleed  Hx rectal prolapse  Recently started on Eliquis for paroxysmal atrial fibrillation.  Currently on hold  Hemoglobin is 6.9 at the office of her family doctor.  7.5 on admission.  Status post 1 unit of blood transfusion currently hemoglobin stable at 8.3.  No further active GI bleed  Continue IV Protonix  GI consulted.  Recommended clear liquid diet and they will evaluate the patient for EGD/colonoscopy tomorrow    Decompensated diastolic heart failure, improved   PAF  Severe aortic stenosis status post TAVR  Patient not compliant with fluid and salt restrictions at home.  Presented with decompensated heart failure and weight gain of 20 pounds   Improved significantly with IV diuresis  Continue metoprolol  Currently holding Eliquis in the view of GI bleed  Echo pending  Cardiology following     Left renal lesion  CT abd/pelvis revealed exphytic left renal lesion.   Renal US pending    Essential hypertension  Dyslipidemia  Continue metoprolol and losartan  Continue Lipitor    Depression  Continue paxil      Hypothyroidism  Continue Synthroid      Lung nodules  Noted on CT abd/pelvis, appear largely stable from prior study   Recommended 6 month follow up with CT Chest.  Will need referral to lung nodule clinic upon discharge       Expected Discharge Location and Transportation: Home  Expected Discharge   Expected Discharge Date: 9/23/2024; Expected Discharge Time:      VTE Prophylaxis:  Pharmacologic & mechanical VTE prophylaxis orders are present.         AM-PAC 6 Clicks Score (PT): 20 (09/22/24 0800)    CODE STATUS:   Code Status and Medical Interventions: No CPR (Do Not Attempt to Resuscitate); Limited  Support; No intubation (DNI)   Ordered at: 09/21/24 6641     Medical Intervention Limits:    No intubation (DNI)     Level Of Support Discussed With:    Patient     Code Status (Patient has no pulse and is not breathing):    No CPR (Do Not Attempt to Resuscitate)     Medical Interventions (Patient has pulse or is breathing):    Limited Support       Yoly Bar MD  09/22/24

## 2024-09-23 ENCOUNTER — ANESTHESIA (OUTPATIENT)
Dept: GASTROENTEROLOGY | Facility: HOSPITAL | Age: 79
DRG: 393 | End: 2024-09-23
Payer: MEDICARE

## 2024-09-23 ENCOUNTER — ANESTHESIA EVENT (OUTPATIENT)
Dept: GASTROENTEROLOGY | Facility: HOSPITAL | Age: 79
DRG: 393 | End: 2024-09-23
Payer: MEDICARE

## 2024-09-23 VITALS
DIASTOLIC BLOOD PRESSURE: 64 MMHG | TEMPERATURE: 98.2 F | OXYGEN SATURATION: 92 % | WEIGHT: 152.6 LBS | HEART RATE: 62 BPM | HEIGHT: 63 IN | BODY MASS INDEX: 27.04 KG/M2 | RESPIRATION RATE: 18 BRPM | SYSTOLIC BLOOD PRESSURE: 166 MMHG

## 2024-09-23 PROBLEM — K92.2 ACUTE GI BLEEDING: Status: RESOLVED | Noted: 2024-09-21 | Resolved: 2024-09-23

## 2024-09-23 PROBLEM — D62 ACUTE BLOOD LOSS ANEMIA: Status: ACTIVE | Noted: 2024-09-21

## 2024-09-23 PROBLEM — D64.9 SYMPTOMATIC ANEMIA: Status: RESOLVED | Noted: 2024-09-21 | Resolved: 2024-09-23

## 2024-09-23 PROBLEM — K92.2 ACUTE GI BLEEDING: Status: ACTIVE | Noted: 2024-09-21

## 2024-09-23 LAB
ALBUMIN SERPL-MCNC: 3.2 G/DL (ref 3.5–5.2)
ALBUMIN/GLOB SERPL: 1.4 G/DL
ALP SERPL-CCNC: 65 U/L (ref 39–117)
ALT SERPL W P-5'-P-CCNC: 7 U/L (ref 1–33)
ANION GAP SERPL CALCULATED.3IONS-SCNC: 9 MMOL/L (ref 5–15)
AST SERPL-CCNC: 18 U/L (ref 1–32)
BASOPHILS # BLD AUTO: 0.03 10*3/MM3 (ref 0–0.2)
BASOPHILS NFR BLD AUTO: 0.5 % (ref 0–1.5)
BILIRUB SERPL-MCNC: 0.6 MG/DL (ref 0–1.2)
BUN SERPL-MCNC: 11 MG/DL (ref 8–23)
BUN/CREAT SERPL: 9.5 (ref 7–25)
CALCIUM SPEC-SCNC: 8.5 MG/DL (ref 8.6–10.5)
CHLORIDE SERPL-SCNC: 107 MMOL/L (ref 98–107)
CO2 SERPL-SCNC: 24 MMOL/L (ref 22–29)
CREAT SERPL-MCNC: 1.16 MG/DL (ref 0.57–1)
DEPRECATED RDW RBC AUTO: 49.6 FL (ref 37–54)
DEPRECATED RDW RBC AUTO: 51 FL (ref 37–54)
EGFRCR SERPLBLD CKD-EPI 2021: 47.5 ML/MIN/1.73
EOSINOPHIL # BLD AUTO: 0.13 10*3/MM3 (ref 0–0.4)
EOSINOPHIL NFR BLD AUTO: 2.2 % (ref 0.3–6.2)
ERYTHROCYTE [DISTWIDTH] IN BLOOD BY AUTOMATED COUNT: 17.2 % (ref 12.3–15.4)
ERYTHROCYTE [DISTWIDTH] IN BLOOD BY AUTOMATED COUNT: 17.4 % (ref 12.3–15.4)
GLOBULIN UR ELPH-MCNC: 2.3 GM/DL
GLUCOSE BLDC GLUCOMTR-MCNC: 157 MG/DL (ref 70–130)
GLUCOSE SERPL-MCNC: 125 MG/DL (ref 65–99)
HCT VFR BLD AUTO: 29.9 % (ref 34–46.6)
HCT VFR BLD AUTO: 30.2 % (ref 34–46.6)
HGB BLD-MCNC: 8.6 G/DL (ref 12–15.9)
HGB BLD-MCNC: 8.8 G/DL (ref 12–15.9)
IMM GRANULOCYTES # BLD AUTO: 0.05 10*3/MM3 (ref 0–0.05)
IMM GRANULOCYTES NFR BLD AUTO: 0.8 % (ref 0–0.5)
LYMPHOCYTES # BLD AUTO: 1.33 10*3/MM3 (ref 0.7–3.1)
LYMPHOCYTES NFR BLD AUTO: 22 % (ref 19.6–45.3)
MAGNESIUM SERPL-MCNC: 2.3 MG/DL (ref 1.6–2.4)
MCH RBC QN AUTO: 23.1 PG (ref 26.6–33)
MCH RBC QN AUTO: 23.7 PG (ref 26.6–33)
MCHC RBC AUTO-ENTMCNC: 28.8 G/DL (ref 31.5–35.7)
MCHC RBC AUTO-ENTMCNC: 29.1 G/DL (ref 31.5–35.7)
MCV RBC AUTO: 80.4 FL (ref 79–97)
MCV RBC AUTO: 81.4 FL (ref 79–97)
MONOCYTES # BLD AUTO: 0.43 10*3/MM3 (ref 0.1–0.9)
MONOCYTES NFR BLD AUTO: 7.1 % (ref 5–12)
NEUTROPHILS NFR BLD AUTO: 4.07 10*3/MM3 (ref 1.7–7)
NEUTROPHILS NFR BLD AUTO: 67.4 % (ref 42.7–76)
NRBC BLD AUTO-RTO: 0 /100 WBC (ref 0–0.2)
PHOSPHATE SERPL-MCNC: 3.2 MG/DL (ref 2.5–4.5)
PLATELET # BLD AUTO: 155 10*3/MM3 (ref 140–450)
PLATELET # BLD AUTO: 167 10*3/MM3 (ref 140–450)
PMV BLD AUTO: 11.5 FL (ref 6–12)
PMV BLD AUTO: 11.5 FL (ref 6–12)
POTASSIUM SERPL-SCNC: 4.1 MMOL/L (ref 3.5–5.2)
PROT SERPL-MCNC: 5.5 G/DL (ref 6–8.5)
RBC # BLD AUTO: 3.71 10*6/MM3 (ref 3.77–5.28)
RBC # BLD AUTO: 3.72 10*6/MM3 (ref 3.77–5.28)
SODIUM SERPL-SCNC: 140 MMOL/L (ref 136–145)
WBC NRBC COR # BLD AUTO: 6.04 10*3/MM3 (ref 3.4–10.8)
WBC NRBC COR # BLD AUTO: 7.35 10*3/MM3 (ref 3.4–10.8)

## 2024-09-23 PROCEDURE — 85027 COMPLETE CBC AUTOMATED: CPT | Performed by: STUDENT IN AN ORGANIZED HEALTH CARE EDUCATION/TRAINING PROGRAM

## 2024-09-23 PROCEDURE — 99232 SBSQ HOSP IP/OBS MODERATE 35: CPT

## 2024-09-23 PROCEDURE — 84100 ASSAY OF PHOSPHORUS: CPT | Performed by: INTERNAL MEDICINE

## 2024-09-23 PROCEDURE — 85025 COMPLETE CBC W/AUTO DIFF WBC: CPT | Performed by: INTERNAL MEDICINE

## 2024-09-23 PROCEDURE — 82948 REAGENT STRIP/BLOOD GLUCOSE: CPT

## 2024-09-23 PROCEDURE — 0DJ08ZZ INSPECTION OF UPPER INTESTINAL TRACT, VIA NATURAL OR ARTIFICIAL OPENING ENDOSCOPIC: ICD-10-PCS | Performed by: INTERNAL MEDICINE

## 2024-09-23 PROCEDURE — 25010000002 ONDANSETRON PER 1 MG

## 2024-09-23 PROCEDURE — 97161 PT EVAL LOW COMPLEX 20 MIN: CPT

## 2024-09-23 PROCEDURE — 43235 EGD DIAGNOSTIC BRUSH WASH: CPT | Performed by: INTERNAL MEDICINE

## 2024-09-23 PROCEDURE — 99239 HOSP IP/OBS DSCHRG MGMT >30: CPT | Performed by: STUDENT IN AN ORGANIZED HEALTH CARE EDUCATION/TRAINING PROGRAM

## 2024-09-23 PROCEDURE — 97535 SELF CARE MNGMENT TRAINING: CPT

## 2024-09-23 PROCEDURE — 83735 ASSAY OF MAGNESIUM: CPT | Performed by: STUDENT IN AN ORGANIZED HEALTH CARE EDUCATION/TRAINING PROGRAM

## 2024-09-23 PROCEDURE — 80053 COMPREHEN METABOLIC PANEL: CPT | Performed by: INTERNAL MEDICINE

## 2024-09-23 PROCEDURE — 97165 OT EVAL LOW COMPLEX 30 MIN: CPT

## 2024-09-23 PROCEDURE — 25010000002 PROPOFOL 10 MG/ML EMULSION

## 2024-09-23 PROCEDURE — 25810000003 LACTATED RINGERS PER 1000 ML: Performed by: ANESTHESIOLOGY

## 2024-09-23 PROCEDURE — 25010000002 FUROSEMIDE PER 20 MG: Performed by: STUDENT IN AN ORGANIZED HEALTH CARE EDUCATION/TRAINING PROGRAM

## 2024-09-23 RX ORDER — MIDAZOLAM HYDROCHLORIDE 1 MG/ML
0.5 INJECTION INTRAMUSCULAR; INTRAVENOUS
Status: CANCELLED | OUTPATIENT
Start: 2024-09-23

## 2024-09-23 RX ORDER — FUROSEMIDE 10 MG/ML
40 INJECTION INTRAMUSCULAR; INTRAVENOUS ONCE
Status: COMPLETED | OUTPATIENT
Start: 2024-09-23 | End: 2024-09-23

## 2024-09-23 RX ORDER — SODIUM CHLORIDE 9 MG/ML
40 INJECTION, SOLUTION INTRAVENOUS AS NEEDED
Status: CANCELLED | OUTPATIENT
Start: 2024-09-23

## 2024-09-23 RX ORDER — NALOXONE HCL 0.4 MG/ML
0.4 VIAL (ML) INJECTION AS NEEDED
Status: DISCONTINUED | OUTPATIENT
Start: 2024-09-23 | End: 2024-09-23 | Stop reason: HOSPADM

## 2024-09-23 RX ORDER — SODIUM CHLORIDE, SODIUM LACTATE, POTASSIUM CHLORIDE, CALCIUM CHLORIDE 600; 310; 30; 20 MG/100ML; MG/100ML; MG/100ML; MG/100ML
30 INJECTION, SOLUTION INTRAVENOUS CONTINUOUS PRN
Status: DISCONTINUED | OUTPATIENT
Start: 2024-09-23 | End: 2024-09-23 | Stop reason: HOSPADM

## 2024-09-23 RX ORDER — SODIUM CHLORIDE 0.9 % (FLUSH) 0.9 %
3-10 SYRINGE (ML) INJECTION AS NEEDED
Status: DISCONTINUED | OUTPATIENT
Start: 2024-09-23 | End: 2024-09-23 | Stop reason: HOSPADM

## 2024-09-23 RX ORDER — SODIUM CHLORIDE 9 MG/ML
40 INJECTION, SOLUTION INTRAVENOUS AS NEEDED
Status: DISCONTINUED | OUTPATIENT
Start: 2024-09-23 | End: 2024-09-23 | Stop reason: HOSPADM

## 2024-09-23 RX ORDER — SODIUM CHLORIDE 0.9 % (FLUSH) 0.9 %
10 SYRINGE (ML) INJECTION EVERY 12 HOURS SCHEDULED
Status: CANCELLED | OUTPATIENT
Start: 2024-09-23

## 2024-09-23 RX ORDER — SODIUM CHLORIDE, SODIUM LACTATE, POTASSIUM CHLORIDE, CALCIUM CHLORIDE 600; 310; 30; 20 MG/100ML; MG/100ML; MG/100ML; MG/100ML
9 INJECTION, SOLUTION INTRAVENOUS CONTINUOUS
Status: CANCELLED | OUTPATIENT
Start: 2024-09-23

## 2024-09-23 RX ORDER — METOPROLOL SUCCINATE 50 MG/1
25 TABLET, EXTENDED RELEASE ORAL DAILY
Start: 2024-09-23

## 2024-09-23 RX ORDER — FUROSEMIDE 20 MG
20 TABLET ORAL DAILY PRN
Qty: 30 TABLET | Refills: 0 | Status: SHIPPED | OUTPATIENT
Start: 2024-09-23

## 2024-09-23 RX ORDER — SODIUM CHLORIDE 9 MG/ML
50 INJECTION, SOLUTION INTRAVENOUS CONTINUOUS
Status: DISCONTINUED | OUTPATIENT
Start: 2024-09-23 | End: 2024-09-23 | Stop reason: HOSPADM

## 2024-09-23 RX ORDER — LIDOCAINE HYDROCHLORIDE 10 MG/ML
0.5 INJECTION, SOLUTION EPIDURAL; INFILTRATION; INTRACAUDAL; PERINEURAL ONCE AS NEEDED
Status: CANCELLED | OUTPATIENT
Start: 2024-09-23

## 2024-09-23 RX ORDER — SODIUM CHLORIDE 0.9 % (FLUSH) 0.9 %
10 SYRINGE (ML) INJECTION EVERY 12 HOURS SCHEDULED
Status: DISCONTINUED | OUTPATIENT
Start: 2024-09-23 | End: 2024-09-23 | Stop reason: HOSPADM

## 2024-09-23 RX ORDER — LIDOCAINE HYDROCHLORIDE 10 MG/ML
0.5 INJECTION, SOLUTION EPIDURAL; INFILTRATION; INTRACAUDAL; PERINEURAL ONCE AS NEEDED
Status: DISCONTINUED | OUTPATIENT
Start: 2024-09-23 | End: 2024-09-23 | Stop reason: HOSPADM

## 2024-09-23 RX ORDER — SODIUM CHLORIDE 0.9 % (FLUSH) 0.9 %
10 SYRINGE (ML) INJECTION AS NEEDED
Status: CANCELLED | OUTPATIENT
Start: 2024-09-23

## 2024-09-23 RX ORDER — SODIUM CHLORIDE 0.9 % (FLUSH) 0.9 %
3 SYRINGE (ML) INJECTION EVERY 12 HOURS SCHEDULED
Status: DISCONTINUED | OUTPATIENT
Start: 2024-09-23 | End: 2024-09-23 | Stop reason: HOSPADM

## 2024-09-23 RX ORDER — DROPERIDOL 2.5 MG/ML
0.62 INJECTION, SOLUTION INTRAMUSCULAR; INTRAVENOUS
Status: DISCONTINUED | OUTPATIENT
Start: 2024-09-23 | End: 2024-09-23 | Stop reason: HOSPADM

## 2024-09-23 RX ORDER — PROPOFOL 10 MG/ML
VIAL (ML) INTRAVENOUS AS NEEDED
Status: DISCONTINUED | OUTPATIENT
Start: 2024-09-23 | End: 2024-09-23 | Stop reason: SURG

## 2024-09-23 RX ORDER — IPRATROPIUM BROMIDE AND ALBUTEROL SULFATE 2.5; .5 MG/3ML; MG/3ML
3 SOLUTION RESPIRATORY (INHALATION) ONCE AS NEEDED
Status: DISCONTINUED | OUTPATIENT
Start: 2024-09-23 | End: 2024-09-23 | Stop reason: HOSPADM

## 2024-09-23 RX ORDER — MIDAZOLAM HYDROCHLORIDE 1 MG/ML
0.5 INJECTION INTRAMUSCULAR; INTRAVENOUS
Status: DISCONTINUED | OUTPATIENT
Start: 2024-09-23 | End: 2024-09-23 | Stop reason: HOSPADM

## 2024-09-23 RX ORDER — ONDANSETRON 2 MG/ML
4 INJECTION INTRAMUSCULAR; INTRAVENOUS ONCE AS NEEDED
Status: DISCONTINUED | OUTPATIENT
Start: 2024-09-23 | End: 2024-09-23 | Stop reason: HOSPADM

## 2024-09-23 RX ORDER — ONDANSETRON 2 MG/ML
INJECTION INTRAMUSCULAR; INTRAVENOUS AS NEEDED
Status: DISCONTINUED | OUTPATIENT
Start: 2024-09-23 | End: 2024-09-23 | Stop reason: SURG

## 2024-09-23 RX ORDER — SODIUM CHLORIDE, SODIUM LACTATE, POTASSIUM CHLORIDE, CALCIUM CHLORIDE 600; 310; 30; 20 MG/100ML; MG/100ML; MG/100ML; MG/100ML
9 INJECTION, SOLUTION INTRAVENOUS CONTINUOUS
Status: DISCONTINUED | OUTPATIENT
Start: 2024-09-23 | End: 2024-09-23 | Stop reason: HOSPADM

## 2024-09-23 RX ORDER — PANTOPRAZOLE SODIUM 40 MG/1
40 TABLET, DELAYED RELEASE ORAL
Status: CANCELLED | OUTPATIENT
Start: 2024-09-23

## 2024-09-23 RX ORDER — HYDRALAZINE HYDROCHLORIDE 20 MG/ML
5 INJECTION INTRAMUSCULAR; INTRAVENOUS
Status: DISCONTINUED | OUTPATIENT
Start: 2024-09-23 | End: 2024-09-23 | Stop reason: HOSPADM

## 2024-09-23 RX ORDER — LABETALOL HYDROCHLORIDE 5 MG/ML
5 INJECTION, SOLUTION INTRAVENOUS
Status: DISCONTINUED | OUTPATIENT
Start: 2024-09-23 | End: 2024-09-23 | Stop reason: HOSPADM

## 2024-09-23 RX ORDER — SODIUM CHLORIDE 0.9 % (FLUSH) 0.9 %
10 SYRINGE (ML) INJECTION AS NEEDED
Status: DISCONTINUED | OUTPATIENT
Start: 2024-09-23 | End: 2024-09-23 | Stop reason: HOSPADM

## 2024-09-23 RX ORDER — PANTOPRAZOLE SODIUM 40 MG/1
TABLET, DELAYED RELEASE ORAL
Qty: 28 TABLET | Refills: 0 | Status: SHIPPED | OUTPATIENT
Start: 2024-09-23 | End: 2024-10-21

## 2024-09-23 RX ORDER — PROMETHAZINE HYDROCHLORIDE 25 MG/1
25 TABLET ORAL ONCE AS NEEDED
Status: DISCONTINUED | OUTPATIENT
Start: 2024-09-23 | End: 2024-09-23 | Stop reason: HOSPADM

## 2024-09-23 RX ORDER — PROMETHAZINE HYDROCHLORIDE 25 MG/1
25 SUPPOSITORY RECTAL ONCE AS NEEDED
Status: DISCONTINUED | OUTPATIENT
Start: 2024-09-23 | End: 2024-09-23 | Stop reason: HOSPADM

## 2024-09-23 RX ORDER — FAMOTIDINE 10 MG/ML
20 INJECTION, SOLUTION INTRAVENOUS ONCE
Status: CANCELLED | OUTPATIENT
Start: 2024-09-23 | End: 2024-09-23

## 2024-09-23 RX ORDER — FAMOTIDINE 20 MG/1
20 TABLET, FILM COATED ORAL ONCE
Status: DISCONTINUED | OUTPATIENT
Start: 2024-09-23 | End: 2024-09-23 | Stop reason: HOSPADM

## 2024-09-23 RX ORDER — FAMOTIDINE 10 MG/ML
20 INJECTION, SOLUTION INTRAVENOUS ONCE
Status: DISCONTINUED | OUTPATIENT
Start: 2024-09-23 | End: 2024-09-23 | Stop reason: HOSPADM

## 2024-09-23 RX ORDER — LIDOCAINE HYDROCHLORIDE 10 MG/ML
INJECTION, SOLUTION EPIDURAL; INFILTRATION; INTRACAUDAL; PERINEURAL AS NEEDED
Status: DISCONTINUED | OUTPATIENT
Start: 2024-09-23 | End: 2024-09-23 | Stop reason: SURG

## 2024-09-23 RX ORDER — FAMOTIDINE 20 MG/1
20 TABLET, FILM COATED ORAL ONCE
Status: CANCELLED | OUTPATIENT
Start: 2024-09-23 | End: 2024-09-23

## 2024-09-23 RX ORDER — DROPERIDOL 2.5 MG/ML
0.62 INJECTION, SOLUTION INTRAMUSCULAR; INTRAVENOUS ONCE AS NEEDED
Status: DISCONTINUED | OUTPATIENT
Start: 2024-09-23 | End: 2024-09-23 | Stop reason: HOSPADM

## 2024-09-23 RX ADMIN — PANTOPRAZOLE SODIUM 40 MG: 40 INJECTION, POWDER, FOR SOLUTION INTRAVENOUS at 16:32

## 2024-09-23 RX ADMIN — METOPROLOL SUCCINATE 25 MG: 25 TABLET, EXTENDED RELEASE ORAL at 10:06

## 2024-09-23 RX ADMIN — ONDANSETRON 4 MG: 2 INJECTION INTRAMUSCULAR; INTRAVENOUS at 11:36

## 2024-09-23 RX ADMIN — PROPOFOL 20 MG: 10 INJECTION, EMULSION INTRAVENOUS at 11:33

## 2024-09-23 RX ADMIN — LOSARTAN POTASSIUM 50 MG: 50 TABLET, FILM COATED ORAL at 10:06

## 2024-09-23 RX ADMIN — PANTOPRAZOLE SODIUM 40 MG: 40 INJECTION, POWDER, FOR SOLUTION INTRAVENOUS at 10:06

## 2024-09-23 RX ADMIN — Medication 1000 UNITS: at 10:06

## 2024-09-23 RX ADMIN — Medication 10 ML: at 16:28

## 2024-09-23 RX ADMIN — PROPOFOL 20 MG: 10 INJECTION, EMULSION INTRAVENOUS at 11:36

## 2024-09-23 RX ADMIN — Medication 10 ML: at 10:07

## 2024-09-23 RX ADMIN — TOPIRAMATE 50 MG: 25 TABLET, FILM COATED ORAL at 10:06

## 2024-09-23 RX ADMIN — LEVOTHYROXINE SODIUM 75 MCG: 0.07 TABLET ORAL at 06:17

## 2024-09-23 RX ADMIN — LIDOCAINE HYDROCHLORIDE 100 MG: 10 INJECTION, SOLUTION EPIDURAL; INFILTRATION; INTRACAUDAL; PERINEURAL at 11:30

## 2024-09-23 RX ADMIN — PROPOFOL 40 MG: 10 INJECTION, EMULSION INTRAVENOUS at 11:30

## 2024-09-23 RX ADMIN — FUROSEMIDE 40 MG: 10 INJECTION, SOLUTION INTRAMUSCULAR; INTRAVENOUS at 16:28

## 2024-09-23 RX ADMIN — Medication 10 ML: at 16:32

## 2024-09-23 RX ADMIN — PAROXETINE HYDROCHLORIDE 20 MG: 20 TABLET, FILM COATED ORAL at 10:06

## 2024-09-23 RX ADMIN — SODIUM CHLORIDE, POTASSIUM CHLORIDE, SODIUM LACTATE AND CALCIUM CHLORIDE: 600; 310; 30; 20 INJECTION, SOLUTION INTRAVENOUS at 11:23

## 2024-09-23 RX ADMIN — Medication 10 ML: at 10:10

## 2024-09-23 RX ADMIN — CYANOCOBALAMIN TAB 1000 MCG 1000 MCG: 1000 TAB at 10:06

## 2024-09-23 NOTE — PLAN OF CARE
Goal Outcome Evaluation:  Plan of Care Reviewed With: patient, family        Progress: no change  Outcome Evaluation: Pt. presents below baseline with ADLs and functional mobility. Limited by decreased activity tolerance, generalized weakness, and balance. Skilled OT services warranted to promote return to PLOF. Recommend home with 24/7 assist and home health at discharge.      Anticipated Discharge Disposition (OT): home with 24/7 care, home with home health

## 2024-09-23 NOTE — PLAN OF CARE
Goal Outcome Evaluation:   Pt progressing well. Calm, Cooperative and accepting of plan of care.  Pt resting in bed, call light within reach, family at bedside.

## 2024-09-23 NOTE — THERAPY EVALUATION
Patient Name: Nette Montalvo  : 1945    MRN: [id number]                              Today's Date: 2024       Admit Date: 2024    Visit Dx:     ICD-10-CM ICD-9-CM   1. Acute GI bleeding  K92.2 578.9   2. Acute blood loss anemia  D62 285.1     Patient Active Problem List   Diagnosis    TIA (transient ischemic attack)    Dysarthria    Paroxysmal spells    Anemia    Atrial fibrillation, persistent    CAD (coronary artery disease), native coronary artery    Essential hypertension    Symptomatic anemia    Acute GI bleeding    Acute blood loss anemia     Past Medical History:   Diagnosis Date    Anxiety     Arthritis     Cataract, bilateral     BILATERAL    Chest pain     none recent    CHF (congestive heart failure)     Coronary artery disease     Depression     Diabetes mellitus     Disease of thyroid gland     Fracture     RIGHT SHOULDER FX, LEFT ANKLE FRACTURE    Full dentures     GERD (gastroesophageal reflux disease)     Heart disease     High cholesterol     History of nuclear stress test 2016    Hypertension     Migraine     Myocardial infarction 2006    s/p CABG x 4    Pneumonia     PONV (postoperative nausea and vomiting)     Seasonal allergies     Seizure     DAUGHTER STATED POSSIBLE SEIZURES.  STATES POSSIBLE MIGRANES/SEIZURES    Skin cancer     face and feet    Wears glasses      Past Surgical History:   Procedure Laterality Date    APPENDECTOMY      CARDIAC CATHETERIZATION      CATARACT EXTRACTION W/ INTRAOCULAR LENS IMPLANT Left 2017    Procedure: CATARACT PHACO EXTRACTION WITH INTRAOCULAR LENS IMPLANT LEFT;  Surgeon: Judy Clifton MD;  Location: [facility] OR;  Service:     CATARACT EXTRACTION W/ INTRAOCULAR LENS IMPLANT Right 2018    Procedure: CATARACT PHACO EXTRACTION WITH INTRAOCULAR LENS IMPLANT RIGHT;  Surgeon: Judy Clifton MD;  Location: [facility] OR;  Service: Ophthalmology    COLONOSCOPY N/A 6/10/2022    Procedure: COLONOSCOPY;  Surgeon: Isaiah  Susan ROMAN MD;  Location: [facility] ENDOSCOPY;  Service: Gastroenterology;  Laterality: N/A;    CORONARY ARTERY BYPASS GRAFT  2006    CABG x 4    ENDOSCOPY N/A 6/10/2022    Procedure: ESOPHAGOGASTRODUODENOSCOPY WITH BIOPSY;  Surgeon: Susan Colon MD;  Location: [facility] ENDOSCOPY;  Service: Gastroenterology;  Laterality: N/A;    SKIN BIOPSY      SKIN CANCER EXCISION      face and foot    TISSUE AORTIC VALVE REPLACEMENT  2017    bovine valve    TOTAL ABDOMINAL HYSTERECTOMY WITH SALPINGO OOPHORECTOMY        General Information       Row Name 09/23/24 0920          OT Time and Intention    Document Type evaluation  -     Mode of Treatment occupational therapy  -       Row Name 09/23/24 0920          General Information    Patient Profile Reviewed yes  -     Prior Level of Function independent:;all household mobility;transfer;feeding;grooming;dressing;min assist:;bathing  SPC at baseline, daughter assists with bathing  -     Existing Precautions/Restrictions fall  -     Barriers to Rehab previous functional deficit  -       Row Name 09/23/24 0920          Occupational Profile    Environmental Supports and Barriers (Occupational Profile) Daughter is with her daily, alone at night  -       Row Name 09/23/24 0920          Living Environment    People in Home alone  -       Row Name 09/23/24 0920          Home Main Entrance    Number of Stairs, Main Entrance one  -LC       Row Name 09/23/24 0920          Stairs Within Home, Primary    Number of Stairs, Within Home, Primary none  -       Row Name 09/23/24 0920          Cognition    Orientation Status (Cognition) oriented x 3  -LC       Row Name 09/23/24 0920          Safety Issues, Functional Mobility    Safety Issues Affecting Function (Mobility) awareness of need for assistance;insight into deficits/self-awareness;safety precaution awareness;safety precautions follow-through/compliance;sequencing abilities  -     Impairments Affecting Function  (Mobility) balance;endurance/activity tolerance;postural/trunk control;strength  -               User Key  (r) = Recorded By, (t) = Taken By, (c) = Cosigned By      Initials Name Provider Type     Caitlin Lira OT Occupational Therapist                     Mobility/ADL's       Row Name 09/23/24 0922          Bed Mobility    Bed Mobility scooting/bridging;supine-sit  -     Scooting/Bridging [place] (Bed Mobility) contact guard;verbal cues  -     Supine-Sit [place] (Bed Mobility) contact guard;verbal cues  -     Assistive Device (Bed Mobility) bed rails;head of bed elevated  -     Comment, (Bed Mobility) Increased time and effort to transition from supine to sit EOB.  -       Row Name 09/23/24 0922          Transfers    Transfers sit-stand transfer;toilet transfer  -     Comment, (Transfers) VC's for hand placement and sequencing.  -       Row Name 09/23/24 0922          Sit-Stand Transfer    Sit-Stand [place] (Transfers) contact guard;verbal cues  -     Assistive Device (Sit-Stand Transfers) other (see comments)  UE support  -       Row Name 09/23/24 0922          Toilet Transfer    [place] Level (Toilet Transfer) contact guard;verbal cues  -     Assistive Device (Toilet Transfer) commode;grab bars/safety frame;other (see comments)  UE support  -       Row Name 09/23/24 0922          Functional Mobility    Functional Mobility- Ind. Level contact guard assist  -     Functional Mobility- Device other (see comments)  UE support  -     Functional Mobility-Distance (Feet) 30  -     Functional Mobility- Comment Occasional assist to steady, Cues to widen MURRAY to improve balance  -       Row Name 09/23/24 0922          Activities of Daily Living    BADL Assessment/Intervention lower body dressing;grooming;toileting  -       Row Name 09/23/24 0922          Lower Body Dressing Assessment/Training    [place] Level (Lower Body Dressing) lower body dressing  skills;moderate assist (50% patient effort)  -       Row Name 09/23/24 0922          Grooming Assessment/Training    [place] Level (Grooming) wash face, hands;contact guard assist  -     Position (Grooming) sink side;supported standing  -       Row Name 09/23/24 0922          Toileting Assessment/Training    [place] Level (Toileting) contact guard assist  -     Assistive Devices (Toileting) commode;grab bar/safety frame  -     Position (Toileting) unsupported sitting;supported standing  -               User Key  (r) = Recorded By, (t) = Taken By, (c) = Cosigned By      Initials Name Provider Type     Caitlin Lira OT Occupational Therapist                   Obj/Interventions       Row Name 09/23/24 0931          Sensory Assessment (Somatosensory)    Sensory Assessment (Somatosensory) UE sensation intact  -[facility] Name 09/23/24 0931          Vision Assessment/Intervention    Visual Impairment/Limitations corrective lenses full-time  -[facility] Name 09/23/24 0931          Range of Motion Comprehensive    General Range of Motion bilateral upper extremity ROM WFL  -[facility] Name 09/23/24 0931          Strength Comprehensive (MMT)    General Manual Muscle Testing (MMT) Assessment upper extremity strength deficits identified  -[facility] Name 09/23/24 0931          Upper Extremity (Manual Muscle Testing)    Upper Extremity: Manual Muscle Testing (MMT) left UE strength is WFL;right UE strength is WFL  -[facility] Name 09/23/24 0931          Balance    Balance Assessment sitting static balance;sitting dynamic balance;standing static balance;standing dynamic balance  -     Static Sitting Balance standby assist  -     Dynamic Sitting Balance contact guard  -LC     Position, Sitting Balance unsupported;sitting edge of bed  -     Static Standing Balance contact guard  -LC     Dynamic Standing Balance contact guard  -LC     Position/Device Used, Standing Balance supported;other  (see comments)  UE support  -     Balance Interventions sitting;standing;sit to stand;supported;occupation based/functional task;weight shifting activity  -LC     Comment, Balance CGA to steady  -               User Key  (r) = Recorded By, (t) = Taken By, (c) = Cosigned By      Initials Name Provider Type     Caitlin Lira OT Occupational Therapist                   Goals/Plan       Row Name 09/23/24 0938          Transfer Goal 1 (OT)    Activity/Assistive Device (Transfer Goal 1, OT) sit-to-stand/stand-to-sit;bed-to-chair/chair-to-bed;toilet;walker, rolling  -     [place] Level/Cues Needed (Transfer Goal 1, OT) standby assist  -     Time Frame (Transfer Goal 1, OT) long term goal (LTG);10 days  -     Progress/Outcome (Transfer Goal 1, OT) goal ongoing  -       Row Name 09/23/24 0938          Toileting Goal 1 (OT)    Activity/Device (Toileting Goal 1, OT) adjust/manage clothing;perform perineal hygiene;commode;grab bar/safety frame  -     [place] Level/Cues Needed (Toileting Goal 1, OT) supervision required  -     Time Frame (Toileting Goal 1, OT) short term goal (STG);5 days  -     Progress/Outcome (Toileting Goal 1, OT) goal ongoing  -       Row Name 09/23/24 0938          Grooming Goal 1 (OT)    Activity/Device (Grooming Goal 1, OT) hair care;oral care;wash face, hands  -     [place] (Grooming Goal 1, OT) standby assist  -     Time Frame (Grooming Goal 1, OT) long term goal (LTG);10 days  -     Progress/Outcome (Grooming Goal 1, OT) goal ongoing  -       Row Name 09/23/24 0938          Therapy Assessment/Plan (OT)    Planned Therapy Interventions (OT) activity tolerance training;adaptive equipment training;BADL retraining;occupation/activity based interventions;patient/caregiver education/training;strengthening exercise;transfer/mobility retraining;functional balance retraining  -               User Key  (r) = Recorded By, (t) = Taken By, (c) = Cosigned By       Initials Name Provider Type    Caitlin Varghese, OT Occupational Therapist                   Clinical Impression       Row Name 09/23/24 0933          Pain Assessment    Pretreatment Pain Rating 0/10 - no pain  -LC     Posttreatment Pain Rating 0/10 - no pain  -LC     Additional Documentation Pain Scale: Word Pre/Post-Treatment (Group)  -LC       Row Name 09/23/24 0933          Plan of Care Review    Plan of Care Reviewed With patient;family  -     Progress no change  -     Outcome Evaluation Pt. presents below baseline with ADLs and functional mobility. Limited by decreased activity tolerance, generalized weakness, and balance. Skilled OT services warranted to promote return to PLOF. Recommend home with 24/7 assist and home health at discharge.  -       Row Name 09/23/24 0933          Therapy Assessment/Plan (OT)    Patient/Family Therapy Goal Statement (OT) To take care of self  -LC     Therapy Frequency (OT) daily  -     Predicted Duration of Therapy Intervention (OT) 3 days  -       Row Name 09/23/24 0933          Therapy Plan Review/Discharge Plan (OT)    Anticipated Discharge Disposition (OT) home with 24/7 care;home with home health  -       Row Name 09/23/24 0933          Positioning and Restraints    Pre-Treatment Position in bed  -     Post Treatment Position bed  -LC     In Bed notified nsg;fowlers;call light within reach;encouraged to call for assist;exit alarm on;with family/caregiver  -               User Key  (r) = Recorded By, (t) = Taken By, (c) = Cosigned By      Initials Name Provider Type    Caitlin Varghese, OT Occupational Therapist                   Outcome Measures       Row Name 09/23/24 0926          How much help from another is currently needed...    Putting on and taking off regular lower body clothing? 2  -LC     Bathing (including washing, rinsing, and drying) 2  -LC     Toileting (which includes using toilet bed pan or urinal) 3  -LC     Putting on and taking off  regular upper body clothing 3  -     Taking care of personal grooming (such as brushing teeth) 3  -     Eating meals 4  -     AM-PAC 6 Clicks Score (OT) 17  -       Row Name 09/23/24 0940          Functional Assessment    Outcome Measure Options AM-PAC 6 Clicks Daily Activity (OT)  -               User Key  (r) = Recorded By, (t) = Taken By, (c) = Cosigned By      Initials Name Provider Type     Caitlin Lira OT Occupational Therapist                    Occupational Therapy Education       Title: PT OT SLP Therapies (In Progress)       Topic: Occupational Therapy (In Progress)       Point: ADL training (In Progress)       Description:   Instruct learner(s) on proper safety adaptation and remediation techniques during self care or transfers.   Instruct in proper use of assistive devices.                  Learning Progress Summary             Patient Acceptance, E, NR by  at 9/23/2024 0809                         Point: Home exercise program (Not Started)       Description:   Instruct learner(s) on appropriate technique for monitoring, assisting and/or progressing therapeutic exercises/activities.                  Learner Progress:  Not documented in this visit.              Point: Precautions (In Progress)       Description:   Instruct learner(s) on prescribed precautions during self-care and functional transfers.                  Learning Progress Summary             Patient Acceptance, E, NR by  at 9/23/2024 0809                         Point: Body mechanics (In Progress)       Description:   Instruct learner(s) on proper positioning and spine alignment during self-care, functional mobility activities and/or exercises.                  Learning Progress Summary             Patient Acceptance, E, NR by  at 9/23/2024 0809                                         User Key       Initials Effective Dates Name Provider Type [facility] 06/16/21 -  Caitlin Lira OT Occupational Therapist OT                   OT Recommendation and Plan  Planned Therapy Interventions (OT): activity tolerance training, adaptive equipment training, BADL retraining, occupation/activity based interventions, patient/caregiver education/training, strengthening exercise, transfer/mobility retraining, functional balance retraining  Therapy Frequency (OT): daily  Plan of Care Review  Plan of Care Reviewed With: patient, family  Progress: no change  Outcome Evaluation: Pt. presents below baseline with ADLs and functional mobility. Limited by decreased activity tolerance, generalized weakness, and balance. Skilled OT services warranted to promote return to PLOF. Recommend home with 24/7 assist and home health at discharge.     Time Calculation:   Evaluation Complexity (OT)  Review Occupational Profile/Medical/Therapy History Complexity: brief/low complexity  Assessment, Occupational Performance/Identification of Deficit Complexity: 1-3 performance deficits  Clinical Decision Making Complexity (OT): problem focused assessment/low complexity  Overall Complexity of Evaluation (OT): low complexity     Time Calculation- OT       Row Name 09/23/24 0943             Time Calculation- OT    OT Start Time 0809  -LC      OT Received On 09/23/24  -      OT Goal Re-Cert Due Date 10/03/24  -         Timed Charges    23332 - OT Self Care/Mgmt Minutes 9  -LC         Untimed Charges    OT Eval/Re-eval Minutes 38  -LC         Total Minutes    Timed Charges Total Minutes 9  -LC      Untimed Charges Total Minutes 38  -LC       Total Minutes 47  -LC                User Key  (r) = Recorded By, (t) = Taken By, (c) = Cosigned By      Initials Name Provider Type     Caitlin Lira OT Occupational Therapist                  Therapy Charges for Today       Code Description Service Date Service Provider Modifiers Qty    [id number]  OT SELF CARE/MGMT/TRAIN EA 15 MIN 9/23/2024 Caitlin Lira OT GO 1    [id number]  OT EVAL LOW COMPLEXITY 3 9/23/2024  Caitlin Lira, OT GO 1                 Caitlin Lira, OT  9/23/2024

## 2024-09-23 NOTE — PLAN OF CARE
Problem: Adult Inpatient Plan of Care  Goal: Plan of Care Review  Recent Flowsheet Documentation  Taken 9/23/2024 0946 by Narda Cooley  Progress: no change  Plan of Care Reviewed With:   patient   family  Outcome Evaluation: PT eval performed: Pt presenting below functional baseline.  Pt able to ambulate 100', HHA, CGA with 2 moments of min-A for steadying, pt with tendancy for narrow stance and required cues for widening of stance to increase balance.  Recommend home with 24/7 assist and HHPT

## 2024-09-23 NOTE — THERAPY EVALUATION
Patient Name: Nette Montalvo  : 1945    MRN: [id number]                              Today's Date: 2024       Admit Date: 2024    Visit Dx:     ICD-10-CM ICD-9-CM   1. Acute GI bleeding  K92.2 578.9   2. Acute blood loss anemia  D62 285.1     Patient Active Problem List   Diagnosis    TIA (transient ischemic attack)    Dysarthria    Paroxysmal spells    Anemia    Atrial fibrillation, persistent    CAD (coronary artery disease), native coronary artery    Essential hypertension    Symptomatic anemia    Acute GI bleeding    Acute blood loss anemia     Past Medical History:   Diagnosis Date    Anxiety     Arthritis     Cataract, bilateral     BILATERAL    Chest pain     none recent    CHF (congestive heart failure)     Coronary artery disease     Depression     Diabetes mellitus     Disease of thyroid gland     Fracture     RIGHT SHOULDER FX, LEFT ANKLE FRACTURE    Full dentures     GERD (gastroesophageal reflux disease)     Heart disease     High cholesterol     History of nuclear stress test 2016    Hypertension     Migraine     Myocardial infarction 2006    s/p CABG x 4    Pneumonia     PONV (postoperative nausea and vomiting)     Seasonal allergies     Seizure     DAUGHTER STATED POSSIBLE SEIZURES.  STATES POSSIBLE MIGRANES/SEIZURES    Skin cancer     face and feet    Wears glasses      Past Surgical History:   Procedure Laterality Date    APPENDECTOMY      CARDIAC CATHETERIZATION      CATARACT EXTRACTION W/ INTRAOCULAR LENS IMPLANT Left 2017    Procedure: CATARACT PHACO EXTRACTION WITH INTRAOCULAR LENS IMPLANT LEFT;  Surgeon: Judy Clifton MD;  Location: [facility] OR;  Service:     CATARACT EXTRACTION W/ INTRAOCULAR LENS IMPLANT Right 2018    Procedure: CATARACT PHACO EXTRACTION WITH INTRAOCULAR LENS IMPLANT RIGHT;  Surgeon: Judy Clifton MD;  Location: [facility] OR;  Service: Ophthalmology    COLONOSCOPY N/A 6/10/2022    Procedure: COLONOSCOPY;  Surgeon: Isaiah  Susan ROMAN MD;  Location: [facility] ENDOSCOPY;  Service: Gastroenterology;  Laterality: N/A;    CORONARY ARTERY BYPASS GRAFT  2006    CABG x 4    ENDOSCOPY N/A 6/10/2022    Procedure: ESOPHAGOGASTRODUODENOSCOPY WITH BIOPSY;  Surgeon: Susan Colon MD;  Location: [facility] ENDOSCOPY;  Service: Gastroenterology;  Laterality: N/A;    SKIN BIOPSY      SKIN CANCER EXCISION      face and foot    TISSUE AORTIC VALVE REPLACEMENT  2017    bovine valve    TOTAL ABDOMINAL HYSTERECTOMY WITH SALPINGO OOPHORECTOMY        General Information       Row Name 09/23/24 0940          Physical Therapy Time and Intention    Document Type evaluation  -KG     Mode of Treatment physical therapy  -KG       Row Name 09/23/24 0940          General Information    Patient Profile Reviewed yes  -KG     Prior Level of Function independent:;all household mobility;transfer;min assist:;bathing  SPC at baseline, daughter assists with bathing  -KG     Existing Precautions/Restrictions fall  -KG     Barriers to Rehab previous functional deficit  -KG       Row Name 09/23/24 0940          Living Environment    People in Home alone  -KG       Row Name 09/23/24 0940          Home Main Entrance    Number of Stairs, Main Entrance one  -KG     Stair Railings, Main Entrance none  -KG       Row Name 09/23/24 0940          Stairs Within Home, Primary    Number of Stairs, Within Home, Primary none  -KG       Row Name 09/23/24 0940          Cognition    Orientation Status (Cognition) oriented x 3  -KG       Row Name 09/23/24 0940          Safety Issues, Functional Mobility    Safety Issues Affecting Function (Mobility) insight into deficits/self-awareness;safety precaution awareness;safety precautions follow-through/compliance;sequencing abilities  -KG     Impairments Affecting Function (Mobility) balance;endurance/activity tolerance;postural/trunk control;strength  -KG               User Key  (r) = Recorded By, (t) = Taken By, (c) = Cosigned By      Initials Name  Provider Type    YOLANDE YasirNarda peters Physical Therapist                   Mobility       Row Name 09/23/24 0942          Bed Mobility    Bed Mobility scooting/bridging;supine-sit  -KG     Scooting/Bridging [place] (Bed Mobility) contact guard;verbal cues  -KG     Supine-Sit [place] (Bed Mobility) contact guard;verbal cues  -KG     Assistive Device (Bed Mobility) bed rails;head of bed elevated  -KG       Row Name 09/23/24 0942          Transfers    Comment, (Transfers) VCs, HP  -KG       Row Name 09/23/24 0942          Sit-Stand Transfer    Sit-Stand [place] (Transfers) contact guard;verbal cues  -KG     Assistive Device (Sit-Stand Transfers) other (see comments)  HHA  -KG       Row Name 09/23/24 0942          Gait/Stairs (Locomotion)    [place] Level (Gait) contact guard;minimum assist (75% patient effort)  -KG     Assistive Device (Gait) other (see comments)  HHA  -KG     Patient was able to Ambulate yes  -KG     Distance in Feet (Gait) 100  -KG     Deviations/Abnormal Patterns (Gait) gait speed decreased  -KG     Bilateral Gait Deviations forward flexed posture;heel strike decreased  -KG     Comment, (Gait/Stairs) Pt able to ambulate 100', HHA, CGA with 2 moments of min-A for steadying, pt with tendancy for narrow stance and required cues for widening of stance to increase balance  -KG               User Key  (r) = Recorded By, (t) = Taken By, (c) = Cosigned By      Initials Name Provider Type    Narda Nam Physical Therapist                   Obj/Interventions       Row Name 09/23/24 0946          Strength Comprehensive (MMT)    General Manual Muscle Testing (MMT) Assessment lower extremity strength deficits identified  -KG     Comment, General Manual Muscle Testing (MMT) Assessment 4-/5 BLE  -KG       Row Name 09/23/24 0946          Balance    Balance Assessment standing dynamic balance  -KG     Dynamic Standing Balance minimal assist;contact guard  -KG     Position/Device Used,  Standing Balance supported  -KG     Balance Interventions standing;sit to stand  -KG               User Key  (r) = Recorded By, (t) = Taken By, (c) = Cosigned By      Initials Name Provider Type    YOLANDE Narda Cooley Physical Therapist                   Goals/Plan       Row Name 09/23/24 0948          Bed Mobility Goal 1 (PT)    Activity/Assistive Device (Bed Mobility Goal 1, PT) sit to supine/supine to sit  -KG     [place] Level/Cues Needed (Bed Mobility Goal 1, PT) supervision required  -KG     Time Frame (Bed Mobility Goal 1, PT) short term goal (STG);2 days  -KG     Progress/Outcomes (Bed Mobility Goal 1, PT) continuing progress toward goal  -KG       Row Name 09/23/24 0948          Transfer Goal 1 (PT)    Activity/Assistive Device (Transfer Goal 1, PT) sit-to-stand/stand-to-sit;bed-to-chair/chair-to-bed  -KG     [place] Level/Cues Needed (Transfer Goal 1, PT) standby assist  -KG     Time Frame (Transfer Goal 1, PT) short term goal (STG);2 days  -KG     Progress/Outcome (Transfer Goal 1, PT) continuing progress toward goal  -KG       Row Name 09/23/24 0948          Gait Training Goal 1 (PT)    Activity/Assistive Device (Gait Training Goal 1, PT) gait (walking locomotion);walker, rolling  -KG     [place] Level (Gait Training Goal 1, PT) standby assist  -KG     Distance (Gait Training Goal 1, PT) 200  -KG     Time Frame (Gait Training Goal 1, PT) long term goal (LTG);5 days  -KG     Progress/Outcome (Gait Training Goal 1, PT) continuing progress toward goal  -KG               User Key  (r) = Recorded By, (t) = Taken By, (c) = Cosigned By      Initials Name Provider Type    YOLANDE Narda Cooley Physical Therapist                   Clinical Impression       Row Name 09/23/24 0946          Pain    Pretreatment Pain Rating 0/10 - no pain  -KG     Posttreatment Pain Rating 0/10 - no pain  -KG       Row Name 09/23/24 0946          Plan of Care Review    Plan of Care Reviewed With patient;family  -KG      Progress no change  -KG     Outcome Evaluation PT eval performed: Pt presenting below functional baseline.  Pt able to ambulate 100', HHA, CGA with 2 moments of min-A for steadying, pt with tendancy for narrow stance and required cues for widening of stance to increase balance.  Recommend home with 24/7 assist and HHPT  -KG       Row Name 09/23/24 0946          Therapy Assessment/Plan (PT)    Patient/Family Therapy Goals Statement (PT) return home  -KG     Rehab Potential (PT) good, to achieve stated therapy goals  -KG     Criteria for Skilled Interventions Met (PT) yes;meets criteria;skilled treatment is necessary  -KG     Therapy Frequency (PT) daily  -KG     Predicted Duration of Therapy Intervention (PT) 5 days  -KG       Row Name 09/23/24 0946          Vital Signs    Pre Systolic BP Rehab 145  -KG     Pre Treatment Diastolic BP 61  -KG     Pretreatment Heart Rate (beats/min) 53  -KG     Posttreatment Heart Rate (beats/min) 55  -KG       Row Name 09/23/24 0946          Positioning and Restraints    Pre-Treatment Position in bed  -KG     Post Treatment Position bed  -KG     In Bed notified nsg;fowlers;call light within reach;encouraged to call for assist;with family/caregiver;exit alarm on  -KG               User Key  (r) = Recorded By, (t) = Taken By, (c) = Cosigned By      Initials Name Provider Type    Narda Nam Physical Therapist                   Outcome Measures       Row Name 09/23/24 0949          How much help from another person do you currently need...    Turning from your back to your side while in flat bed without using bedrails? 4  -KG     Moving from lying on back to sitting on the side of a flat bed without bedrails? 4  -KG     Moving to and from a bed to a chair (including a wheelchair)? 3  -KG     Standing up from a chair using your arms (e.g., wheelchair, bedside chair)? 3  -KG     Climbing 3-5 steps with a railing? 3  -KG     To walk in hospital room? 3  -KG     AM-PAC 6 Clicks  Score (PT) 20  -KG     Highest Level of Mobility Goal 6 --> Walk 10 steps or more  -KG       Row Name 09/23/24 0949 09/23/24 0940       Functional Assessment    Outcome Measure Options AM-PAC 6 Clicks Basic Mobility (PT)  -KG AM-PAC 6 Clicks Daily Activity (OT)  -              User Key  (r) = Recorded By, (t) = Taken By, (c) = Cosigned By      Initials Name Provider Type    LC Caitlin Lira OT Occupational Therapist    KG Narda Cooley Physical Therapist                                 Physical Therapy Education       Title: PT OT SLP Therapies (In Progress)       Topic: Physical Therapy (In Progress)       Point: Mobility training (In Progress)       Learning Progress Summary             Patient Acceptance, E, NR by KG at 9/23/2024 0950                         Point: Home exercise program (In Progress)       Learning Progress Summary             Patient Acceptance, E, NR by KG at 9/23/2024 0950                         Point: Body mechanics (In Progress)       Learning Progress Summary             Patient Acceptance, E, NR by KG at 9/23/2024 0950                         Point: Precautions (In Progress)       Learning Progress Summary             Patient Acceptance, E, NR by KG at 9/23/2024 0950                                         User Key       Initials Effective Dates Name Provider Type Discipline    KG 01/04/23 -  Narda Cooley Physical Therapist PT                  PT Recommendation and Plan     Plan of Care Reviewed With: patient, family  Progress: no change  Outcome Evaluation: PT eval performed: Pt presenting below functional baseline.  Pt able to ambulate 100', HHA, CGA with 2 moments of min-A for steadying, pt with tendancy for narrow stance and required cues for widening of stance to increase balance.  Recommend home with 24/7 assist and HHPT     Time Calculation:         PT Charges       Row Name 09/23/24 0952             Time Calculation    Start Time 0820  -KG      PT Received On 09/23/24   -KG      PT Goal Re-Cert Due Date 10/03/24  -KG                User Key  (r) = Recorded By, (t) = Taken By, (c) = Cosigned By      Initials Name Provider Type    Narda Nam Physical Therapist                  Therapy Charges for Today       Code Description Service Date Service Provider Modifiers Qty    [id number] HC PT EVAL LOW COMPLEXITY 4 9/23/2024 Narda Cooley GP 1            PT G-Codes  Outcome Measure Options: AM-PAC 6 Clicks Basic Mobility (PT)  AM-PAC 6 Clicks Score (PT): 20  AM-PAC 6 Clicks Score (OT): 17  PT Discharge Summary  Anticipated Discharge Disposition (PT): home with 24/7 care, home with home health    Narda Cooley  9/23/2024

## 2024-09-23 NOTE — DISCHARGE SUMMARY
[facility] Medicine Services  DISCHARGE SUMMARY    Patient Name: Nette Montalvo  : 1945  MRN: [id number]    Date of Admission: 2024  1:07 PM  Date of Discharge: 2024  Primary Care Physician: Carol Virgen APRN    Consults       Date and Time Order Name Status Description    2024  5:41 PM Inpatient Gastroenterology Consult Completed     2024  5:41 PM Inpatient Cardiology Consult Completed             Hospital Course     Presenting Problem: Symptomatic anemia    Active Hospital Problems    Diagnosis  POA    Acute blood loss anemia [D62]  Yes    Atrial fibrillation, persistent [I48.19]  Yes    CAD (coronary artery disease), native coronary artery [I25.10]  Yes    Essential hypertension [I10]  Yes    Anemia [D64.9]  Yes      Resolved Hospital Problems    Diagnosis Date Resolved POA    **Symptomatic anemia [D64.9] 2024 Yes    Acute GI bleeding [K92.2] 2024 Unknown          Hospital Course:  Nette Montalvo is a 79 y.o. female who presented on  for symptomatic anemia in the setting of BRBPR and Eliquis taken for A-fib.  Patient had labs drawn the day before and her doctors office called her and told her her hemoglobin was 6.9 and that she needed to go to the ER. Repeat hemoglobin on arrival was 7.5 however patient was symptomatic so she was transfused 1 unit of blood with significant improvement in her symptoms.  Patient had had small amounts of BRBPR due to known hemorrhoids and rectal prolapse.  No other signs of bleeding.  Her Eliquis was held and she was started on IV Protonix and GI was consulted.  EGD was done on  with no biopsies taken.  No ulcers or active bleeding noted.  Of note patient did have respiratory decompensation during the EGD.  However she recovered with oral oxygen and bag mask.  She quickly returned to baseline shortly after.  It was recommended that she see colorectal surgery for her prolapse however patient  declined at this time.  She was discharged on an oral PPI.  After extensive discussion with patient family and cardiology the decision was made to restart patient's Eliquis and recheck labs in a couple of weeks with cardiology follow-up in a month.  If patient's anemia worsened they may consider watchman.    Patient stay also complicated by decompensated heart failure.  She was given IV diuretics with significant improvement in her lower extremity edema.  Per cardiology recommendations at discharge she was started on a as needed Lasix dose 20 mg to be given based on weight gain.  Directions and plan were gone over with patient and her family.  Patient will have cardiology follow-up in about a month.    Discharge Follow Up Recommendations for outpatient labs/diagnostics:  Encouraged 1 to 2-week follow-up with primary care physician       Day of Discharge     HPI:   Patient seen and examined today after EGD.  She is back to baseline on room air and able to tolerate a diet.  She denies shortness of breath.  Patient feels comfortable going home, family at bedside agrees.  She was given an additional dose of IV Lasix for some minimal lower extremity swelling just prior to discharge.    Review of Systems   Respiratory:  Negative for cough and shortness of breath.    Cardiovascular:  Positive for leg swelling. Negative for chest pain.   Gastrointestinal:  Negative for abdominal pain, constipation, diarrhea and nausea.         Vital Signs:   Temp:  [97 °F (36.1 °C)-98.2 °F (36.8 °C)] 98.2 °F (36.8 °C)  Heart Rate:  [51-78] 62  Resp:  [14-18] 18  BP: (137-181)/(61-85) 166/64  Flow (L/min):  [2] 2      Physical Exam:  Physical Exam  Constitutional:       General: She is not in acute distress.  Cardiovascular:      Rate and Rhythm: Normal rate and regular rhythm.      Heart sounds: Normal heart sounds.   Pulmonary:      Effort: Pulmonary effort is normal. No respiratory distress.      Breath sounds: Normal breath sounds.    Abdominal:      Palpations: Abdomen is soft.      Tenderness: There is no abdominal tenderness.   Musculoskeletal:      Right lower leg: No edema.      Left lower leg: No edema.   Neurological:      General: No focal deficit present.      Mental Status: She is alert. Mental status is at baseline.   Psychiatric:         Mood and Affect: Mood normal.         Thought Content: Thought content normal.          Pertinent  and/or Most Recent Results     LAB RESULTS:      Lab 09/23/24  1616 09/23/24  0518 09/22/24 0629 09/21/24  1334   WBC 7.35 6.04 6.38 6.89   HEMOGLOBIN 8.8* 8.6* 8.3* 7.5*   HEMATOCRIT 30.2* 29.9* 28.0* 27.2*   PLATELETS 167 155 160 194   NEUTROS ABS  --  4.07 4.35 4.87   IMMATURE GRANS (ABS)  --  0.05 0.04 0.03   LYMPHS ABS  --  1.33 1.34 1.42   MONOS ABS  --  0.43 0.51 0.42   EOS ABS  --  0.13 0.11 0.11   MCV 81.4 80.4 78.7* 80.7   PROTIME  --   --   --  15.9*   APTT  --   --   --  37.1*         Lab 09/23/24  0518 09/22/24  1808 09/22/24 0629 09/21/24  1334   SODIUM 140  --  141 143   POTASSIUM 4.1 4.2 3.4* 3.9   CHLORIDE 107  --  104 107   CO2 24.0  --  27.0 24.0   ANION GAP 9.0  --  10.0 12.0   BUN 11  --  16 17   CREATININE 1.16*  --  1.17* 1.19*   EGFR 47.5*  --  47.0* 46.0*   GLUCOSE 125*  --  128* 110*   CALCIUM 8.5*  --  8.8 9.1   MAGNESIUM 2.3  --  1.5*  --    PHOSPHORUS 3.2  --   --   --    HEMOGLOBIN A1C  --   --  5.70*  --    TSH  --   --  3.800  --          Lab 09/23/24  0518 09/22/24  0629 09/21/24  1334   TOTAL PROTEIN 5.5* 5.7* 6.2   ALBUMIN 3.2* 3.4* 3.8   GLOBULIN 2.3 2.3 2.4   ALT (SGPT) 7 7 9   AST (SGOT) 18 18 21   BILIRUBIN 0.6 1.0 0.4   ALK PHOS 65 67 86         Lab 09/21/24  1334   PROTIME 15.9*   INR 1.26*             Lab 09/21/24  1455   ABO TYPING O   RH TYPING Positive   ANTIBODY SCREEN Positive         Brief Urine Lab Results       None          Microbiology Results (last 10 days)       ** No results found for the last 240 hours. **            Adult Transthoracic Echo  Complete W/ Cont if Necessary Per Protocol    Result Date: 9/22/2024    Left ventricular systolic function is normal. Calculated left ventricular EF = 57.6% Left ventricular ejection fraction appears to be 56 - 60%.   Left ventricular diastolic function was indeterminate.   Moderately reduced right ventricular systolic function noted.   The right ventricular cavity is moderately dilated.   The left atrial cavity is moderately dilated.   The right atrial cavity is moderately  dilated.   There is a TAVR valve present. Mild paravalvular regurgitation is present in the prosthetic aortic valve.   Severe tricuspid valve regurgitation is present.   Estimated right ventricular systolic pressure from tricuspid regurgitation is mildly elevated (35-45 mmHg).   Mild pulmonary hypertension is present.     US Renal Bilateral    Result Date: 9/22/2024  US RENAL BILATERAL Date of Exam: 9/22/2024 11:33 AM EDT Indication: concern for exphytic lesion. Comparison: CT abdomen pelvis September 21, 2024 Technique: Grayscale and color Doppler ultrasound evaluation of the kidneys and urinary bladder was performed. Findings: Right kidney: 10.4 cm in length. Echogenicity does not appear unusual. There is no hydronephrosis. It looks like there are probably parapelvic renal cysts. Left kidney: There is a suggested cyst off the lower pole of the left kidney measuring 1.6 cm which could account for the finding noted on the CT. There is suggestion of some parapelvic renal cysts. Echogenicity kidney does not appear unusual. Bladder: The bladder does not appear unusual for the degree of distention.     Impression: 1.Suggested cyst lower pole left kidney. 2.Parapelvic renal cysts bilaterally suggested. Electronically Signed: Montana Guy MD  9/22/2024 1:40 PM EDT  Workstation ID: [id number]    CT Abdomen Pelvis Without Contrast    Result Date: 9/21/2024  CT ABDOMEN PELVIS WO CONTRAST Date of Exam: 9/21/2024 3:10 PM EDT Indication: GI BLEEDING. Anemia.  Comparison: None available. Technique: Axial CT images were obtained of the abdomen and pelvis without the administration of contrast. Reconstructed coronal and sagittal images were also obtained. Automated exposure control and iterative construction methods were used. Findings: There are multiple pulmonary nodules in the lung bases that appears similar to the chest CT of 9/9/2023 allowing for motion on both examinations. For example, there is a groundglass nodule in the right lower lobe measuring 8 to 9 mm on image 5. There is a left lower lobe nodule measuring 6 to 7 mm on image 24. An area of nodular consolidation in the medial right middle lobe measures about 11 mm on image 3, previously about 13 mm. Multiple smaller nodules are noted throughout both lung bases. Attention on 6-month follow-up chest CT is recommended. Heart is enlarged. There is coronary artery disease, and the patient is status post TAVR. There is a trace amount of right pleural fluid now noted. There is diffuse atherosclerotic disease with no aortic aneurysm. Anasarca is noted. This is particularly evident in the lower ventral abdominal wall. Correlate clinically to exclude cellulitis. Gallbladder contains stones. There is no biliary obstruction. There is increased density of the bilateral renal pyramids which may reflect nephrocalcinosis. There is an exophytic lesion arising from the lower pole of the left kidney measuring 1.6 cm. Nonemergent evaluation with renal ultrasound is recommended to exclude a mass. No ureteral stones or hydronephrosis on either side. There are parapelvic cysts in both kidneys. The unenhanced solid abdominal organs are otherwise normal. Urinary bladder is normal. Uterus is surgically absent. The appendix is surgically absent. Large bowel is normal without evidence of colitis. There is no small bowel obstruction identified. The stomach is normal. No free fluid or adenopathy is identified. There is diffuse degenerative  disease in the lower thoracic spine and in the lumbar spine.     1. Nodules in the lung bases as above are largely stable from a prior chest CT. There is a trace amount of right pleural fluid today. Attention on 6-month follow-up chest CT is recommended. 2. Evidence of nephrocalcinosis with no ureteral stones or hydronephrosis on either side. 3. Determinate exophytic left renal lesion. Follow-up with ultrasound is recommended to exclude a mass. 4. Cholelithiasis. 5. Appendectomy. No acute findings in the GI tract. 6. Anasarca with fairly intense fat stranding in the ventral lower abdominal wall. Correlate clinically to exclude cellulitis. 7. Additional nonacute findings as above. Electronically Signed: Mike Lugo MD  9/21/2024 3:30 PM EDT  Workstation ID: [id number]     Results for orders placed during the hospital encounter of 03/24/17    Bilateral Carotid Duplex    Interpretation Summary  · No evidence of hemodynamically significant stenosis of bilateral carotid arteries.  · Intimal thickening and minimal scattered plaque is noted.      Results for orders placed during the hospital encounter of 03/24/17    Bilateral Carotid Duplex    Interpretation Summary  · No evidence of hemodynamically significant stenosis of bilateral carotid arteries.  · Intimal thickening and minimal scattered plaque is noted.      Results for orders placed during the hospital encounter of 09/21/24    Adult Transthoracic Echo Complete W/ Cont if Necessary Per Protocol    Interpretation Summary    Left ventricular systolic function is normal. Calculated left ventricular EF = 57.6% Left ventricular ejection fraction appears to be 56 - 60%.    Left ventricular diastolic function was indeterminate.    Moderately reduced right ventricular systolic function noted.    The right ventricular cavity is moderately dilated.    The left atrial cavity is moderately dilated.    The right atrial cavity is moderately  dilated.    There is a TAVR valve  present. Mild paravalvular regurgitation is present in the prosthetic aortic valve.    Severe tricuspid valve regurgitation is present.    Estimated right ventricular systolic pressure from tricuspid regurgitation is mildly elevated (35-45 mmHg).    Mild pulmonary hypertension is present.      Plan for Follow-up of Pending Labs/Results: Patient will have follow-up with cardiology in a month    Discharge Details        Discharge Medications        New Medications        Instructions Start Date   pantoprazole 40 MG EC tablet  Commonly known as: PROTONIX   Take 1 tablet by mouth 2 (Two) Times a Day for 14 days, THEN 1 tablet Daily for 14 days.   Start Date: September 23, 2024            Changes to Medications        Instructions Start Date   furosemide 20 MG tablet  Commonly known as: LASIX  What changed:   when to take this  reasons to take this  additional instructions   20 mg, Oral, Daily PRN, Take 1 pill if you gain more than 3 lbs in a day or more than 5lbs over 2 days             Continue These Medications        Instructions Start Date   acetaminophen 500 MG tablet  Commonly known as: TYLENOL   500 mg, Oral, Every 6 Hours PRN      apixaban 5 MG tablet tablet  Commonly known as: ELIQUIS   5 mg, Oral, Every 12 Hours Scheduled      aspirin 81 MG EC tablet   81 mg, Oral, Daily      atorvastatin 40 MG tablet  Commonly known as: LIPITOR   40 mg, Oral, Daily      BIOTIN PO   10,000 mcg, Oral, Daily      cetirizine 10 MG tablet  Commonly known as: zyrTEC   10 mg, Oral, Daily PRN      cholecalciferol 25 MCG (1000 UT) tablet  Commonly known as: VITAMIN D3   1,000 Units, Oral, Daily      levothyroxine 75 MCG tablet  Commonly known as: SYNTHROID, LEVOTHROID   75 mcg, Oral, Daily      lisinopril 10 MG tablet  Commonly known as: PRINIVIL,ZESTRIL   10 mg, Oral, Daily      magnesium oxide 400 MG tablet  Commonly known as: MAG-OX   400 mg, Oral, Daily      metFORMIN  MG 24 hr tablet  Commonly known as: GLUCOPHAGE-XR   500  mg, Oral, Daily With Breakfast      metoprolol succinate XL 50 MG 24 hr tablet  Commonly known as: TOPROL-XL   25 mg, Oral, Daily      nitroglycerin 0.4 MG SL tablet  Commonly known as: NITROSTAT   0.4 mg, Sublingual, Every 5 Minutes PRN, Take no more than 3 doses in 15 minutes.      PARoxetine 20 MG tablet  Commonly known as: PAXIL   20 mg, Oral, Every Morning      potassium chloride 10 MEQ CR tablet  Commonly known as: KLOR-CON M10   10 mEq, Oral, Daily      topiramate 50 MG tablet  Commonly known as: TOPAMAX   50 mg, Oral, Every 12 Hours Scheduled      True Metrix Blood Glucose Test test strip  Generic drug: glucose blood   TEST BLOOD 3 TIMES A DAY      vitamin B-12 1000 MCG tablet  Commonly known as: CYANOCOBALAMIN   1,000 mcg, Oral, Daily               Allergies   Allergen Reactions    Ceftin [Cefuroxime Axetil] Confusion    Contrast Dye (Echo Or Unknown Ct/Mr) Rash         Discharge Disposition:  Home or Self Care    Diet:  Hospital:  Diet Order   Procedures    Diet: Gastrointestinal; Fiber-Restricted, Low Irritant; Texture: Soft to Chew (NDD 3); Soft to Chew: Whole Meat; Fluid Consistency: Thin (IDDSI 0)            Activity: As tolerated      Restrictions or Other Recommendations:  None       CODE STATUS:    Code Status and Medical Interventions: No CPR (Do Not Attempt to Resuscitate); Limited Support; No intubation (DNI)   Ordered at: 09/21/24 1721     Medical Intervention Limits:    No intubation (DNI)     Level Of Support Discussed With:    Patient     Code Status (Patient has no pulse and is not breathing):    No CPR (Do Not Attempt to Resuscitate)     Medical Interventions (Patient has pulse or is breathing):    Limited Support       Future Appointments   Date Time Provider Department Center   10/24/2024  1:15 PM Fran Powell MD [facility]       Additional Instructions for the Follow-ups that You Need to Schedule       CBC (No Diff)    Sep 30, 2024 (Approximate)      Release to patient: Routine  Release                      Betina Nuñez MD  09/23/24      Time Spent on Discharge:  I spent  50  minutes on this discharge activity which included: face-to-face encounter with the patient, reviewing the data in the system, coordination of the care with the nursing staff as well as consultants, documentation, and entering orders.

## 2024-09-23 NOTE — PROGRESS NOTES
[facility]  PROGRESS NOTE    Nette Montalvo   [id number]   1945    LOS: 1 day .  Date of Admission: 9/21/2024  Date of Service: 09/23/24    Primary Care Physician: Carol Virgen APRN    Chief Complaint: f/u atrial fibrillation    Problem list:  Persistent atrial fibrillation  [id number], anticoagulated Eliquis  AAD: Failed amio  Echo, 12/2023: EF 50-55%, mild concentric LVH  Holter, 4/2024: 100% A-fib burden  Echo, 9/22/24: EF 56-60%, Moderately reduced RV systolic function, TAVR valve present, mild paravalvular regurgitation, severe TR, mild pulmonary hypertension  Coronary artery disease  S/p remote CABG  Severe aortic stenosis  S/p TAVR, 2017  Hypertension  Hyperlipidemia  Type 2 diabetes  Hypothyroid    Subjective      Patient lying in bed. States she is feeling better today. SB on the monitor. Patient had EGD today. Family at bedside.     ROS  All systems have been reviewed and are negative with the exception of those mentioned in the HPI and problem list above.     Objective   Vital Sign Min/Max for last 24 hours  Temp  Min: 98.1 °F (36.7 °C)  Max: 98.8 °F (37.1 °C)   BP  Min: 143/61  Max: 173/74   Pulse  Min: 53  Max: 66   Resp  Min: 14  Max: 18   SpO2  Min: 95 %  Max: 97 %   No data recorded   Weight  Min: 69.2 kg (152 lb 9.6 oz)  Max: 69.2 kg (152 lb 9.6 oz)     Physical Exam:  GENERAL: Alert, cooperative, in no acute distress.   HEENT: Normocephalic, no jugular venous distention  HEART: Regular rhythm, normal rate, and no murmurs, gallops, or rubs.   LUNGS: Clear to auscultation bilaterally. No wheezing, rales or rhonchi. Room air  NEUROLOGIC: No focal abnormalities involving strength or sensation are noted.   EXTREMITIES: No clubbing, cyanosis. 1+ edema noted.     Results:  Results from last 7 days   Lab Units 09/23/24  0518 09/22/24  0629 09/21/24  1334   WBC 10*3/mm3 6.04 6.38 6.89   HEMOGLOBIN g/dL 8.6* 8.3* 7.5*   HEMATOCRIT % 29.9* 28.0* 27.2*    PLATELETS 10*3/mm3 155 160 194     Results from last 7 days   Lab Units 09/23/24  0518 09/22/24  1808 09/22/24  0629 09/21/24  1334   SODIUM mmol/L 140  --  141 143   POTASSIUM mmol/L 4.1 4.2 3.4* 3.9   CHLORIDE mmol/L 107  --  104 107   CO2 mmol/L 24.0  --  27.0 24.0   BUN mg/dL 11  --  16 17   CREATININE mg/dL 1.16*  --  1.17* 1.19*   GLUCOSE mg/dL 125*  --  128* 110*      Lab Results   Component Value Date    CHOL 66 03/25/2017    TRIG 60 03/25/2017    HDL 28 (L) 03/25/2017    LDL 26 03/25/2017    AST 18 09/23/2024    ALT 7 09/23/2024     Results from last 7 days   Lab Units 09/22/24  0629   HEMOGLOBIN A1C % 5.70*         Results from last 7 days   Lab Units 09/22/24  0629   TSH uIU/mL 3.800         Results from last 7 days   Lab Units 09/21/24  1334   PROTIME Seconds 15.9*   INR  1.26*   APTT seconds 37.1*             No intake or output data in the 24 hours ending 09/23/24 0931    EKG/TELE: SB    Radiology Data:   US Renal Bilateral    Result Date: 9/22/2024  Impression: 1.Suggested cyst lower pole left kidney. 2.Parapelvic renal cysts bilaterally suggested. Electronically Signed: Montana Guy MD  9/22/2024 1:40 PM EDT  Workstation ID: [id number]    CT Abdomen Pelvis Without Contrast    Result Date: 9/21/2024  1. Nodules in the lung bases as above are largely stable from a prior chest CT. There is a trace amount of right pleural fluid today. Attention on 6-month follow-up chest CT is recommended. 2. Evidence of nephrocalcinosis with no ureteral stones or hydronephrosis on either side. 3. Determinate exophytic left renal lesion. Follow-up with ultrasound is recommended to exclude a mass. 4. Cholelithiasis. 5. Appendectomy. No acute findings in the GI tract. 6. Anasarca with fairly intense fat stranding in the ventral lower abdominal wall. Correlate clinically to exclude cellulitis. 7. Additional nonacute findings as above. Electronically Signed: Mike Lugo MD  9/21/2024 3:30 PM EDT  Workstation ID: [id number]     Results for orders placed during the hospital encounter of 09/21/24    Adult Transthoracic Echo Complete W/ Cont if Necessary Per Protocol    Interpretation Summary    Left ventricular systolic function is normal. Calculated left ventricular EF = 57.6% Left ventricular ejection fraction appears to be 56 - 60%.    Left ventricular diastolic function was indeterminate.    Moderately reduced right ventricular systolic function noted.    The right ventricular cavity is moderately dilated.    The left atrial cavity is moderately dilated.    The right atrial cavity is moderately  dilated.    There is a TAVR valve present. Mild paravalvular regurgitation is present in the prosthetic aortic valve.    Severe tricuspid valve regurgitation is present.    Estimated right ventricular systolic pressure from tricuspid regurgitation is mildly elevated (35-45 mmHg).    Mild pulmonary hypertension is present.     Current Medications:  [Held by provider] apixaban, 5 mg, Oral, Q12H  [Held by provider] aspirin, 81 mg, Oral, Daily  atorvastatin, 40 mg, Oral, Nightly  cholecalciferol, 1,000 Units, Oral, Daily  [Held by provider] furosemide, 20 mg, Oral, Daily  levothyroxine, 75 mcg, Oral, Daily  losartan, 50 mg, Oral, Q24H  metoprolol succinate XL, 25 mg, Oral, Daily  pantoprazole, 40 mg, Intravenous, BID AC  PARoxetine, 20 mg, Oral, QAM  sodium chloride, 10 mL, Intravenous, Q12H  topiramate, 50 mg, Oral, Q12H  vitamin B-12, 1,000 mcg, Oral, Daily         Assessment and Plan:   Symptomatic anemia  Initial hgb 7.5  Eliquis on hold  GI evaluating -EGD today  Persistent atrial fibrillation  [id number] 5 (age, sex, HTN, CAD), eliquis currently on hold  Holter, 4/2024: 100% A-fib burden   AAD: failed amiodarone, unable to tolerate cardizem due to hair loss  AF ablation vs AVN ablation/PPM offered but patient deferred several months ago. Currently been rate controlled with Metoprolol.   Coronary artery disease  Remote CABG  Continue ASA  and statin  Aortic stenosis, s/p TAVR 2017  Echo 12/2023: EF 50-55%, functioning TAVR  Echo, 9/2024: EF 56-60%, TAVR valve present, mild paravalvular regurgitation present, severe tricuspid valve regurgitation, mild pulmonary HTN  Hypertension  elevated    -This time patient's atrial fibrillation seems to be rate controlled with toprol 25 mg daily. Currently SB. Patient and family deny fast heart rates. It is unclear if her fatigue and shortness of breath is from A-fib versus anemia. Will re-evaluate at follow up appt in 1 month.   -Discussed LAAO device. Patient and family would like to resume AC and monitor H&H over the next several weeks. GI unable to find a cause today other than gastritis. If H&H continues to decrease, they would be interested in discussing LAAO device to get of of AC. CBC in 1 week.   -Switch losartan back to lisinopril 10 mg daily. They will monitor BP at homes and notify if it continues to remain high.   -Recommend Lasix IV dose  -We will see on an as needed basis. Please call with questions or concerns.   -1 month follow up with Dr. Powell.    Electronically signed by JOHN Acuña, 09/23/24, 3:40 PM EDT.    Please note that portions of this note were dictated utilizing Dragon dictation.

## 2024-10-22 NOTE — PROGRESS NOTES
Nette Montalvo  [id number]  1945  [phone]      10/24/2024      [facility] CARDIOLOGY     Referring Provider: No ref. provider found     Carol Virgen APRN  [address]  [place] [address]    Chief Complaint   Patient presents with    Atrial fibrillation, persistent       Problem List  Persistent atrial fibrillation  [id number], anticoagulated Eliquis  AAD: Failed amio  Echo, 12/2023: EF 50-55%, mild concentric LVH  Holter, 4/2024: 100% A-fib burden  Echo, 9/22/24: EF 56-60%  Coronary artery disease  S/p remote CABG  Severe aortic stenosis  S/p TAVR, 2017  Hypertension  Hyperlipidemia  Type 2 diabetes  Hypothyroid      History of Present Illness   Nette Montalvo is a 79 y.o. female who presents to my electrophysiology clinic for follow up of atrial fibrillation. Since we last saw the patient, patient has been doing well from a cardiac standpoint. EGD showed gastritis. She was transfused PRBCs. She is in NSR today. She brought recent blood work that showed H&H improving while on eliquis. She denies shortness of breath, LH, dizziness and syncope.     Outpatient Medications Marked as Taking for the 10/24/24 encounter (Office Visit) with Fran Powell MD   Medication Sig Dispense Refill    acetaminophen (TYLENOL) 500 MG tablet Take 1 tablet by mouth Every 6 (Six) Hours As Needed for Mild Pain.      apixaban (ELIQUIS) 5 MG tablet tablet Take 1 tablet by mouth Every 12 (Twelve) Hours. 60 tablet 0    aspirin 81 MG EC tablet Take 1 tablet by mouth Daily.      atorvastatin (LIPITOR) 40 MG tablet Take 1 tablet by mouth Daily.      BIOTIN PO Take 10,000 mcg by mouth Daily.      cetirizine (zyrTEC) 10 MG tablet Take 1 tablet by mouth Daily As Needed.      Cholecalciferol (VITAMIN D) 1000 units tablet Take 1 tablet by mouth Daily.      furosemide (LASIX) 20 MG tablet Take 1 tablet by mouth Daily As Needed (weight gain). Take 1 pill if you gain more than 3 lbs in a day or more than 5lbs over  "2 days 30 tablet 0    levothyroxine (SYNTHROID, LEVOTHROID) 75 MCG tablet Take 1 tablet by mouth Daily.      lisinopril (PRINIVIL,ZESTRIL) 10 MG tablet Take 1 tablet by mouth Daily.      magnesium oxide (MAG-OX) 400 MG tablet Take 1 tablet by mouth Daily. 30 tablet 0    metFORMIN ER (GLUCOPHAGE-XR) 500 MG 24 hr tablet Take 1 tablet by mouth Daily With Breakfast.      metoprolol succinate XL (TOPROL-XL) 50 MG 24 hr tablet Take 0.5 tablets by mouth Daily.      nitrofurantoin, macrocrystal-monohydrate, (MACROBID) 100 MG capsule Take 1 capsule by mouth 2 (Two) Times a Day.      nitroglycerin (NITROSTAT) 0.4 MG SL tablet Place 1 tablet under the tongue Every 5 (Five) Minutes As Needed for Chest Pain. Take no more than 3 doses in 15 minutes.      pantoprazole (PROTONIX) 40 MG EC tablet Take 1 tablet by mouth Daily.      PARoxetine (PAXIL) 20 MG tablet Take 1 tablet by mouth Every Morning.      potassium chloride (KLOR-CON M10) 10 MEQ CR tablet Take 1 tablet by mouth As Needed.      topiramate (TOPAMAX) 50 MG tablet Take 1 tablet by mouth Every 12 (Twelve) Hours. 60 tablet 1    TRUE METRIX BLOOD GLUCOSE TEST test strip TEST BLOOD 3 TIMES A DAY  5    vitamin B-12 (CYANOCOBALAMIN) 1000 MCG tablet Take 1 tablet by mouth Daily.              Physical Exam  Vitals:    10/24/24 1311   BP: 158/66   BP Location: Right arm   Patient Position: Sitting   Cuff Size: Adult   Pulse: 52   SpO2: 97%   Weight: 69.4 kg (153 lb)   Height: 160 cm (63\")     Body mass index is 27.1 kg/m².  Constitutional:       Appearance: Healthy appearance.   Neck:      Vascular: JVD normal.   Pulmonary:      Effort: Pulmonary effort is normal.      Breath sounds: Normal breath sounds.      Comments: + cough  Cardiovascular:      Normal rate. Regular rhythm. Normal S1. Normal S2.       Murmurs: There is no murmur.      No gallop.  No rub.   Pulses:     Intact distal pulses.   Edema:     Peripheral edema absent.   Neurological:      General: No focal deficit " present.          Diagnostic Data  Procedures  EKG: Normal sinus rhythm, 71 bpm, , QRS 82, .  Normal EKG    Lab Results   Component Value Date    GLUCOSE 125 (H) 09/23/2024    CALCIUM 8.5 (L) 09/23/2024     09/23/2024    K 4.1 09/23/2024    CO2 24.0 09/23/2024     09/23/2024    BUN 11 09/23/2024    CREATININE 1.16 (H) 09/23/2024    EGFRIFNONA 82 03/24/2017    BCR 9.5 09/23/2024    ANIONGAP 9.0 09/23/2024     Lab Results   Component Value Date    WBC 7.35 09/23/2024    HGB 8.8 (L) 09/23/2024    HCT 30.2 (L) 09/23/2024    MCV 81.4 09/23/2024     09/23/2024     Lab Results   Component Value Date    INR 1.26 (H) 09/21/2024    INR 1.0 03/24/2017    PROTIME 15.9 (H) 09/21/2024    PROTIME 11.5 (L) 03/24/2017     Lab Results   Component Value Date    TSH 3.800 09/22/2024       I personally viewed and interpreted the patient's EKG/Telemetry/lab data    Nettecherise Montalvo  reports that she has never smoked. She has never been exposed to tobacco smoke. She has never used smokeless tobacco. I have educated her on the risk of diseases from using tobacco products such as cancer, COPD, and heart disease.       ACP discussion was declined by the patient. Patient does not have an advance directive, declines further assistance.    Assessment and Plan  Diagnoses and all orders for this visit:    1. Atrial fibrillation, persistent (Primary)    2. Anemia, unspecified type    3. History of GI bleed    4. Coronary artery disease involving native coronary artery of native heart without angina pectoris    5. Essential hypertension        Persistent atrial fibrillation  -[id number] 5, anticoagulated Eliquis  -Holter, 4/2024: 100% A-fib burden  -Amiodarone failed. Started on diltiazem for rate control, but stopped due to hair loss. Currently tolerating metoprolol.   -Patient is in NSR today and has been feeling good. Will defer procedures at this time. Have discussed options of A-fib ablation versus AV node  ablation along with leadless pacemaker implantation if needed.       GI bleed / anemia   -Gastritis per EGD. Patient desaturated during the procedure.   -H&H stable on Eliquis  -Discussed LAAO device implantation if she cannot tolerate AC.        Coronary artery disease  -S/p remote CABG  -continue statin  -Follows with Dr. Leavitt       Hypertension  -Elevated today. Will continue to monitor at home and contact Dr. Leavitt if it remains elevated.     Follow-Up  Return in about 1 year (around 10/24/2025).      Thank you for allowing me to participate in the care of your patient. Please to not hesitate to contact me with additional questions or concerns.     JOHN Acuña

## 2024-10-24 ENCOUNTER — OFFICE VISIT (OUTPATIENT)
Dept: CARDIOLOGY | Facility: CLINIC | Age: 79
End: 2024-10-24
Payer: MEDICARE

## 2024-10-24 VITALS
SYSTOLIC BLOOD PRESSURE: 158 MMHG | HEART RATE: 52 BPM | WEIGHT: 153 LBS | DIASTOLIC BLOOD PRESSURE: 66 MMHG | HEIGHT: 63 IN | OXYGEN SATURATION: 97 % | BODY MASS INDEX: 27.11 KG/M2

## 2024-10-24 DIAGNOSIS — I25.10 CORONARY ARTERY DISEASE INVOLVING NATIVE CORONARY ARTERY OF NATIVE HEART WITHOUT ANGINA PECTORIS: Chronic | ICD-10-CM

## 2024-10-24 DIAGNOSIS — I10 ESSENTIAL HYPERTENSION: Chronic | ICD-10-CM

## 2024-10-24 DIAGNOSIS — D64.9 ANEMIA, UNSPECIFIED TYPE: ICD-10-CM

## 2024-10-24 DIAGNOSIS — Z87.19 HISTORY OF GI BLEED: ICD-10-CM

## 2024-10-24 DIAGNOSIS — I48.19 ATRIAL FIBRILLATION, PERSISTENT: Primary | Chronic | ICD-10-CM

## 2024-10-24 RX ORDER — NITROFURANTOIN 25; 75 MG/1; MG/1
100 CAPSULE ORAL 2 TIMES DAILY
COMMUNITY
Start: 2024-10-21

## 2024-10-24 RX ORDER — PANTOPRAZOLE SODIUM 40 MG/1
40 TABLET, DELAYED RELEASE ORAL DAILY
COMMUNITY
Start: 2024-10-07

## 2025-01-21 ENCOUNTER — TELEPHONE (OUTPATIENT)
Dept: CARDIOLOGY | Facility: CLINIC | Age: 80
End: 2025-01-21

## 2025-01-21 NOTE — TELEPHONE ENCOUNTER
Caller: Samaria Tinajero    Relationship to patient: Emergency Contact    Best call back number: [phone]    Chief complaint:  FLUCTUATING HR, WEAKNESS (ON EXERTION)      Additional notes: PT HAS BEEN HAVING THESE SYMPTOMS FOR ABOUT A WEEK. PT IS TAKING [id number] MG, BUT HER DAUGHTER GAVE HER THE WHOLE TABLET YESTERDAY ( HAD HER CUTTING IT IN HALF) TO SEE IF IT WOULD HELP WITH THE HEART RATE. SHE SAID HER HEART RATE STILL FLUCTUATED. PT'S DAUGHTER WANTS TO KNOW IF THERE IS ANYTHING ELSE THEY DO ABOUT THE MEDICATION.     PT'S HEART RATE THIS MORNING , THEN WENT DOWN TO 60. LAST TIME SHE CHECKED IT WAS 64.     NO SHORTNESS OF BREATH OR CHEST PAIN.

## 2025-01-21 NOTE — TELEPHONE ENCOUNTER
Spoke with daughter, Samaria, concerning the issues the patient is experiencing. Samaria reports that the patient's HR is fluctuating between , both on exertion and during rest.  With these episodes, patient is experiencing weakness occasionally when exerting herself.  She states this does not happen all the time. At this time patient HR is 81 with BP of 102/64.  Denies SOB/CP/dizziness.  Pt taking metoprolol as prescribed.  Would like to know options/treatment for this fluctuating heart rate.

## 2025-01-22 NOTE — TELEPHONE ENCOUNTER
Called daughter and informed her of Dr. Powell's recommendations. Answered all questions at this time.  Samaria will talk this over with her mother (the patient), and her brother.  Will let us know of their decision.

## 2025-02-07 NOTE — TELEPHONE ENCOUNTER
Patient's daughter called to give you an update. She said that her mother's HR has been staying in the 120's, but for the past week it has been staying in the 50's and low 60's. She states that she does feel better when her HR is in the 50's and 60's, but she is still weak. Today she has not given her Metoprolol. Her BP is 138/87 and her HR is 52 bpm.    She would like to know which of the two treatment options that you recommended would be better for her mother?

## 2025-02-10 NOTE — TELEPHONE ENCOUNTER
Patient's daughter notified and aware. She is going to think about her treatment options and let us know what they decide.

## (undated) DEVICE — ADAPT CLN LUM OLYMP AIR/H20

## (undated) DEVICE — TUBING, SUCTION, 1/4" X 10', STRAIGHT: Brand: MEDLINE

## (undated) DEVICE — CLEAR CORNEAL KNIFE 2.4MM ANG: Brand: SHARPOINT

## (undated) DEVICE — Device

## (undated) DEVICE — VLV SXN AIR/H2O ORCAPOD3 1P/U STRL

## (undated) DEVICE — CONMED SCOPE SAVER BITE BLOCK, 20X27 MM: Brand: SCOPE SAVER

## (undated) DEVICE — SOL IRR H2O BTL 1000ML STRL

## (undated) DEVICE — 0.8MM CLEARPORT PARA KNIFE: Brand: SHARPOINT

## (undated) DEVICE — GLV SURG SENSICARE W/ALOE PF LF 7 STRL

## (undated) DEVICE — PK CATARACT OPTHAMALOGY

## (undated) DEVICE — KT POSTOP CATARACT PT CARE

## (undated) DEVICE — 2.0MM SHARPTOME™ CRESCENT KNIFE ANGLED, BEVEL UP: Brand: SHARPOINT

## (undated) DEVICE — THE BITE BLOCK MAXI, LATEX FREE STRAP IS USED TO PROTECT THE ENDOSCOPE INSERTION TUBE FROM BEING BITTEN BY THE PATIENT.

## (undated) DEVICE — SOL IRRIG H2O 1000ML STRL

## (undated) DEVICE — SINGLE-USE BIOPSY FORCEPS: Brand: RADIAL JAW 4

## (undated) DEVICE — SUCTION CANISTER, 1500CC, RIGID: Brand: DEROYAL

## (undated) DEVICE — HYBRID CO2 TUBING/CAP SET FOR OLYMPUS® SCOPES & CO2 SOURCE: Brand: ERBE

## (undated) DEVICE — GLV SURG SENSICARE W/ALOE PF LF 6.5 STRL

## (undated) DEVICE — KT ORCA ORCAPOD DISP STRL

## (undated) DEVICE — SKIN MARKER,FINE TIP: Brand: DEVON

## (undated) DEVICE — HP CONCL INTREPID COAX I/A CRV .3MM

## (undated) DEVICE — YANKAUER,BULB TIP, NO VENT: Brand: ARGYLE

## (undated) DEVICE — GLV SURG SENSICARE W/ALOE PF LF 7.5 STRL

## (undated) DEVICE — SYR LUER SLPTP 50ML

## (undated) DEVICE — SYR LUERLOK 50ML

## (undated) DEVICE — INTRO ACCSR BLNT TP

## (undated) DEVICE — MEDI-VAC NON-CONDUCTIVE SUCTION TUBING: Brand: CARDINAL HEALTH

## (undated) DEVICE — CONTN GRAD MEAS TRIANG 32OZ BLK

## (undated) DEVICE — GLV SURG SENSICARE W/ALOE PF LF SZ6 STRL

## (undated) DEVICE — SOLIDIFIER LIQ PREMISORB 1500CC

## (undated) DEVICE — LUBE JELLY PK/2.75GM STRL BX/144

## (undated) DEVICE — LUBE JELLY FOIL PACK 1.4 OZ: Brand: MEDLINE INDUSTRIES, INC.

## (undated) DEVICE — SAFELINER SUCTION CANISTER 1000CC: Brand: DEROYAL

## (undated) DEVICE — FIRST STEP BEDSIDE ADD WATER KIT - RESEALABLE STAND-UP POUCH, ENDOSCOPIC CLEANING PAD - 1 POUCH: Brand: FIRST STEP BEDSIDE ADD WATER KIT - RESEALABLE STAND-UP POUCH, ENDOSCOPIC CLEANIN